# Patient Record
Sex: FEMALE | Race: WHITE | Employment: OTHER | URBAN - METROPOLITAN AREA
[De-identification: names, ages, dates, MRNs, and addresses within clinical notes are randomized per-mention and may not be internally consistent; named-entity substitution may affect disease eponyms.]

---

## 2018-01-27 ENCOUNTER — HOSPITAL ENCOUNTER (OUTPATIENT)
Dept: OTHER | Age: 68
Discharge: HOME | End: 2018-01-27
Attending: INTERNAL MEDICINE

## 2018-01-28 PROBLEM — N17.9 AKI (ACUTE KIDNEY INJURY) (HCC): Status: ACTIVE | Noted: 2018-01-28

## 2018-01-29 LAB
RAPID INFLUENZA  B AGN: NEGATIVE
RAPID INFLUENZA A AGN: POSITIVE

## 2018-11-20 ENCOUNTER — HOSPITAL ENCOUNTER (OUTPATIENT)
Age: 68
Setting detail: SPECIMEN
Discharge: HOME | End: 2018-11-20
Payer: MEDICARE

## 2018-11-20 PROCEDURE — 87077 CULTURE AEROBIC IDENTIFY: CPT

## 2018-11-20 PROCEDURE — 87186 SC STD MICRODIL/AGAR DIL: CPT

## 2018-11-20 PROCEDURE — 87073 CULTURE BACTERIA ANAEROBIC: CPT

## 2018-11-20 PROCEDURE — 87071 CULTURE AEROBIC QUANT OTHER: CPT

## 2018-11-24 LAB
CULTURE: NORMAL
CULTURE: NORMAL
Lab: NORMAL
ORGANISM: NORMAL
REPORT STATUS: NORMAL
SPECIMEN: NORMAL

## 2019-03-09 ENCOUNTER — HOSPITAL ENCOUNTER (EMERGENCY)
Age: 69
Discharge: HOME | End: 2019-03-09
Payer: MEDICARE

## 2019-03-09 ENCOUNTER — APPOINTMENT (OUTPATIENT)
Dept: GENERAL RADIOLOGY | Age: 69
End: 2019-03-09
Payer: MEDICARE

## 2019-03-09 ENCOUNTER — APPOINTMENT (OUTPATIENT)
Dept: CT IMAGING | Age: 69
End: 2019-03-09
Payer: MEDICARE

## 2019-03-09 VITALS
RESPIRATION RATE: 16 BRPM | HEIGHT: 64 IN | HEART RATE: 86 BPM | OXYGEN SATURATION: 96 % | TEMPERATURE: 97.5 F | SYSTOLIC BLOOD PRESSURE: 159 MMHG | WEIGHT: 149 LBS | DIASTOLIC BLOOD PRESSURE: 94 MMHG | BODY MASS INDEX: 25.44 KG/M2

## 2019-03-09 DIAGNOSIS — R68.84 JAW PAIN: ICD-10-CM

## 2019-03-09 DIAGNOSIS — M79.604 ACUTE LEG PAIN, RIGHT: ICD-10-CM

## 2019-03-09 DIAGNOSIS — S09.90XA INJURY OF HEAD, INITIAL ENCOUNTER: ICD-10-CM

## 2019-03-09 DIAGNOSIS — M25.562 ACUTE PAIN OF LEFT KNEE: ICD-10-CM
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
## 2019-03-09 PROCEDURE — 73560 X-RAY EXAM OF KNEE 1 OR 2: CPT

## 2019-03-09 PROCEDURE — 6370000000 HC RX 637 (ALT 250 FOR IP): Performed by: PHYSICIAN ASSISTANT

## 2019-03-09 PROCEDURE — 99284 EMERGENCY DEPT VISIT MOD MDM: CPT

## 2019-03-09 PROCEDURE — 72125 CT NECK SPINE W/O DYE: CPT

## 2019-03-09 PROCEDURE — 70450 CT HEAD/BRAIN W/O DYE: CPT

## 2019-03-09 PROCEDURE — 70486 CT MAXILLOFACIAL W/O DYE: CPT

## 2019-03-09 PROCEDURE — 73590 X-RAY EXAM OF LOWER LEG: CPT

## 2019-03-09 RX ORDER — ACETAMINOPHEN 500 MG
1000 TABLET ORAL ONCE
Status: COMPLETED | OUTPATIENT
Start: 2019-03-09 | End: 2019-03-09

## 2019-03-09 RX ADMIN — ACETAMINOPHEN 1000 MG: 500 TABLET ORAL at 18:54

## 2019-03-09 ASSESSMENT — PAIN SCALES - GENERAL
PAINLEVEL_OUTOF10: 5
PAINLEVEL_OUTOF10: 5

## 2019-03-09 ASSESSMENT — PAIN DESCRIPTION - PAIN TYPE: TYPE: ACUTE PAIN

## 2022-12-19 ENCOUNTER — HOSPITAL ENCOUNTER (OUTPATIENT)
Dept: GENERAL RADIOLOGY | Age: 72
Discharge: HOME | End: 2022-12-19
Payer: MEDICARE

## 2022-12-19 ENCOUNTER — HOSPITAL ENCOUNTER (OUTPATIENT)
Dept: SPEECH THERAPY | Age: 72
Setting detail: THERAPIES SERIES
Discharge: HOME | End: 2022-12-19
Payer: MEDICARE

## 2022-12-19 DIAGNOSIS — G72.41: ICD-10-CM

## 2022-12-19 DIAGNOSIS — R13.10 PROBLEMS WITH SWALLOWING AND MASTICATION: Primary | ICD-10-CM

## 2022-12-19 PROCEDURE — 92611 MOTION FLUOROSCOPY/SWALLOW: CPT | Performed by: SPEECH-LANGUAGE PATHOLOGIST

## 2022-12-19 PROCEDURE — 74230 X-RAY XM SWLNG FUNCJ C+: CPT

## 2022-12-19 NOTE — PROCEDURES
INSTRUMENTAL SWALLOW REPORT  MODIFIED BARIUM SWALLOW    Thank you for referring Nicole Friend    1950 for a modified barium swallow study. Please see attached results and contact me at your convenience if you have any questions or concerns. Skip Barnhart, SLP, MA, CCC-SLP  Speech/Language Pathologist  [facility]  Department of [address] [place] [address] Pathology  [phone]  2022  10:59 AM      NAME: Nicole Friend   : 1950  MRN: [id number]       Date of Eval: 2022     Ordering Physician: Je Interiano  Radiologist: Dr. Aguila Meier.     Referring Diagnosis(es): Referring Diagnosis: R13.10    Impressions:  Nicole Friend was seen for an outpatient MBSS due to her c/o dysphagia to bread and thin liquids. Relevant h/o Inclusion body myositis, HTN, AAA, IBS and Reflux. Pt specifically c/o globus sensation with PO intake as if food or liquid is \"just sitting\" in her throat. She denies any choking episodes. Pt reports lip weakness and numbness resulting in occasional anterior oral loss of liquids by spoon. In terms of nutrition, pt reports that she tires after 30 minutes of eating and cannot get through a meal stating \"I don't eat all that much. \"  She reports a 30# weight loss over one year and has weighed approximately 130# for the last six months. The oral phase of the swallow was [facility] with adequate bilabial closure for spoon, cup and straw presentations. Mildly prolonged mastication with normal clearance for all textures. Moderate pharyngoesophageal dysphagia with virtually absent epiglottic inversion and anterior laryngeal excursion and severely limited upper esophageal sphincter opening. Deficits resulted in trace penetration for thin liquids midway to the vocal folds and to the anterior commissure for thin residue.   Also severe residue in the pyriforms for all textures, effortful clearance through the UES with backflow into the pharynx observed. Pt was able to clear small amounts of residue with 3-5 swallows. She demonstrated reduced sensitivity to residue and required cues to complete repeat swallows. Esophageal screen:  sweep not completed, however, noted solids remained in upper 1/3 of the esophagus after the swallow. Recommend:  Regular solids as tolerated/Thins liquids by cup. Aspiration and Reflux precautions. May consider use of alternate route of nutrition to supplement oral intake due to c/o inability to consume full meals due to fatigue resulting from pharyngoesophageal dysphagia, h/o weight loss or monitor nutritional status/weight until needed. May consider use of oral nutritional supplement. Refer to GI for further assessment of esophagus. Past Medical History:  has a past medical history of Abdominal aortic aneurysm (AAA) >39 mm diameter ([address] Hypertension, and IBS (irritable bowel syndrome). Past Surgical History:  has a past surgical history that includes Tonsillectomy. Date of Prior Study: n/a  Type of Study: Initial MBS  Results of Prior Study: n/a    RecOral Preparation / Oral Phase     Pharyngeal Phase  Pharyngeal Phase: Impaired    Esophageal Phase  Esophageal Screen: Impaired    Upper Esophageal Screen- Major Contributing Deficits  Major Contributing Deficits: Reduced Cricopharyngeal Opening;Esophageal Backflow into the Pharynx; Decreased Esophageal Clearanceent CXR/CT of Chest:  n/a    Patient Complaints/Reason for Referral:  Tamara Rodriguez was referred for a MBS to assess the efficiency of his/her swallow function, assess for aspiration, and to make recommendations regarding safe dietary consistencies, effective compensatory strategies, and safe eating environment.   Patient complaints: c/o poor pharyngeal clearance for liquids and bread    Onset of problem:   Date of Onset: chronic with recent worsening     Subjective  Subjective: pt became tearful when reviewing results of the study    Behavior/Cognition/Vision/Hearing:  Behavior/Cognition: Alert; Cooperative;Pleasant mood  Vision: Within Functional Limits  Hearing: Within functional limits          Treatment Dx and ICD 10:   Patient Position: Lateral and    Consistencies Administered: Regular;Pureed; Thin teaspoon; Thin cup; Thin straw  Compensatory Swallowing Strategies Attempted: Remain upright for 30-45 minutes after meals; Alternate solids and liquids;Eat/Feed slowly  Postural Changes and/or Swallow Maneuvers Trialed: Upright 90 degrees    Pharyngeal: Moderate    Dysphagia Outcome Severity Scale: Level 3: Moderate dysphagia- Total assisstance, supervision or strategies. Two or more diet consistencies restricted  Penetration-Aspiration Scale (PAS): 5 - Material enters the airway, contacts the vocal folds, and is not ejected into the airway    Recommended Diet:  Solid consistency: Regular  Liquid consistency: Thin       Medication administration: PO    Safe Swallow Protocol:  Supervision: Independent  Compensatory Swallowing Strategies : Alternate solids and liquids;Upright as possible for all oral intake;Remain upright for 30-45 minutes after meals;Eat/Feed slowly    Recommendations/Treatment  Requires SLP Intervention: No     D/C Recommendations: No follow up therapy recommended post discharge     Recommended Exercises: not indicated at this time given etiology of dysphagia    Education: Images and recommendations were reviewed with pt following this exam.   Patient Education: results and recommendations  Patient Education Response: Demonstrated understanding    Prognosis  Prognosis for safe diet advancement: fair  Barriers/Prognosis: reduced esophageal opening  Duration/Frequency of Treatment  Duration of Treatment: n/a  Frequency of Treatment: n/a  Safety Devices  Safety Devices in place: Yes  Type of devices:  All fall risk precautions in place  Restraints Initially in Place: No    Goals:    Long Term:   Timeframe for Long-term Goals: n/a     Short Term:  n/a       Pain     Therapy Time:   Individual Concurrent Group Co-treatment   Time In  0945         Time Out  1030         Minutes  [address] [place], 12/19/2022, 10:59 AM

## 2023-01-20 ENCOUNTER — APPOINTMENT (OUTPATIENT)
Dept: GENERAL RADIOLOGY | Age: 73
DRG: 184 | End: 2023-01-20
Payer: MEDICARE

## 2023-01-20 ENCOUNTER — HOSPITAL ENCOUNTER (INPATIENT)
Age: 73
LOS: 3 days | Discharge: NURSING, REHAB OR LONG-TERM CARE | DRG: 184 | End: 2023-01-23
Attending: EMERGENCY MEDICINE | Admitting: STUDENT IN AN ORGANIZED HEALTH CARE EDUCATION/TRAINING PROGRAM
Payer: MEDICARE

## 2023-01-20 ENCOUNTER — APPOINTMENT (OUTPATIENT)
Dept: CT IMAGING | Age: 73
DRG: 184 | End: 2023-01-20
Payer: MEDICARE
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
## 2023-01-20 DIAGNOSIS — G72.41 INCLUSION BODY MYOSITIS: ICD-10-CM

## 2023-01-20 DIAGNOSIS — J90 PLEURAL EFFUSION: ICD-10-CM

## 2023-01-20 DIAGNOSIS — D64.9 ANEMIA, UNSPECIFIED TYPE: ICD-10-CM

## 2023-01-20 DIAGNOSIS — M79.18 MUSCULOSKELETAL PAIN: ICD-10-CM

## 2023-01-20 DIAGNOSIS — S92.406A CLOSED NONDISPLACED FRACTURE OF PHALANX OF GREAT TOE, UNSPECIFIED LATERALITY, UNSPECIFIED PHALANX, INITIAL ENCOUNTER: Primary | ICD-10-CM
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
## 2023-01-20 LAB
ABO/RH: NORMAL
ALBUMIN SERPL-MCNC: 4 GM/DL (ref 3.4–5)
ALP BLD-CCNC: 76 IU/L (ref 40–128)
ALT SERPL-CCNC: 24 U/L (ref 10–40)
ANION GAP SERPL CALCULATED.3IONS-SCNC: 10 MMOL/L (ref 4–16)
ANTIBODY SCREEN: NEGATIVE
AST SERPL-CCNC: 27 IU/L (ref 15–37)
BASOPHILS ABSOLUTE: 0 K/CU MM
BASOPHILS RELATIVE PERCENT: 0.3 % (ref 0–1)
BILIRUB SERPL-MCNC: 0.8 MG/DL (ref 0–1)
BUN BLDV-MCNC: 34 MG/DL (ref 6–23)
CALCIUM SERPL-MCNC: 10.1 MG/DL (ref 8.3–10.6)
CHLORIDE BLD-SCNC: 96 MMOL/L (ref 99–110)
CO2: 28 MMOL/L (ref 21–32)
CREAT SERPL-MCNC: 1.1 MG/DL (ref 0.6–1.1)
DIFFERENTIAL TYPE: ABNORMAL
EOSINOPHILS ABSOLUTE: 0.1 K/CU MM
EOSINOPHILS RELATIVE PERCENT: 2 % (ref 0–3)
GFR SERPL CREATININE-BSD FRML MDRD: 53 ML/MIN/1.73M2
GLUCOSE BLD-MCNC: 101 MG/DL (ref 70–99)
HCT VFR BLD CALC: 26.3 % (ref 37–47)
HEMOGLOBIN: 8.7 GM/DL (ref 12.5–16)
IMMATURE NEUTROPHIL %: 0.3 % (ref 0–0.43)
LACTATE: 0.9 MMOL/L (ref 0.5–1.9)
LYMPHOCYTES ABSOLUTE: 0.8 K/CU MM
LYMPHOCYTES RELATIVE PERCENT: 20.7 % (ref 24–44)
MCH RBC QN AUTO: 31.6 PG (ref 27–31)
MCHC RBC AUTO-ENTMCNC: 33.1 % (ref 32–36)
MCV RBC AUTO: 95.6 FL (ref 78–100)
MONOCYTES ABSOLUTE: 0.4 K/CU MM
MONOCYTES RELATIVE PERCENT: 11.2 % (ref 0–4)
NUCLEATED RBC %: 0 %
PDW BLD-RTO: 13.1 % (ref 11.7–14.9)
PLATELET # BLD: 137 K/CU MM (ref 140–440)
PMV BLD AUTO: 9.4 FL (ref 7.5–11.1)
POTASSIUM SERPL-SCNC: 3.3 MMOL/L (ref 3.5–5.1)
RBC # BLD: 2.75 M/CU MM (ref 4.2–5.4)
SEGMENTED NEUTROPHILS ABSOLUTE COUNT: 2.6 K/CU MM
SEGMENTED NEUTROPHILS RELATIVE PERCENT: 65.5 % (ref 36–66)
SODIUM BLD-SCNC: 134 MMOL/L (ref 135–145)
TOTAL CK: 134 IU/L (ref 26–140)
TOTAL IMMATURE NEUTOROPHIL: 0.01 K/CU MM
TOTAL NUCLEATED RBC: 0 K/CU MM
TOTAL PROTEIN: 7.1 GM/DL (ref 6.4–8.2)
WBC # BLD: 3.9 K/CU MM (ref 4–10.5)

## 2023-01-20 PROCEDURE — 84443 ASSAY THYROID STIM HORMONE: CPT

## 2023-01-20 PROCEDURE — 85045 AUTOMATED RETICULOCYTE COUNT: CPT

## 2023-01-20 PROCEDURE — 73610 X-RAY EXAM OF ANKLE: CPT

## 2023-01-20 PROCEDURE — 85025 COMPLETE CBC W/AUTO DIFF WBC: CPT

## 2023-01-20 PROCEDURE — 1200000000 HC SEMI PRIVATE

## 2023-01-20 PROCEDURE — 73630 X-RAY EXAM OF FOOT: CPT

## 2023-01-20 PROCEDURE — 86900 BLOOD TYPING SEROLOGIC ABO: CPT

## 2023-01-20 PROCEDURE — 80053 COMPREHEN METABOLIC PANEL: CPT

## 2023-01-20 PROCEDURE — 86901 BLOOD TYPING SEROLOGIC RH(D): CPT

## 2023-01-20 PROCEDURE — 86850 RBC ANTIBODY SCREEN: CPT

## 2023-01-20 PROCEDURE — 83550 IRON BINDING TEST: CPT

## 2023-01-20 PROCEDURE — 84439 ASSAY OF FREE THYROXINE: CPT

## 2023-01-20 PROCEDURE — 73590 X-RAY EXAM OF LOWER LEG: CPT

## 2023-01-20 PROCEDURE — 72170 X-RAY EXAM OF PELVIS: CPT

## 2023-01-20 PROCEDURE — 71250 CT THORAX DX C-: CPT

## 2023-01-20 PROCEDURE — 6370000000 HC RX 637 (ALT 250 FOR IP): Performed by: EMERGENCY MEDICINE

## 2023-01-20 PROCEDURE — 6360000002 HC RX W HCPCS: Performed by: STUDENT IN AN ORGANIZED HEALTH CARE EDUCATION/TRAINING PROGRAM

## 2023-01-20 PROCEDURE — 6370000000 HC RX 637 (ALT 250 FOR IP): Performed by: NURSE PRACTITIONER

## 2023-01-20 PROCEDURE — 93005 ELECTROCARDIOGRAM TRACING: CPT | Performed by: EMERGENCY MEDICINE

## 2023-01-20 PROCEDURE — 71045 X-RAY EXAM CHEST 1 VIEW: CPT

## 2023-01-20 PROCEDURE — 99285 EMERGENCY DEPT VISIT HI MDM: CPT

## 2023-01-20 PROCEDURE — 2580000003 HC RX 258: Performed by: STUDENT IN AN ORGANIZED HEALTH CARE EDUCATION/TRAINING PROGRAM

## 2023-01-20 PROCEDURE — 83540 ASSAY OF IRON: CPT

## 2023-01-20 PROCEDURE — 83605 ASSAY OF LACTIC ACID: CPT

## 2023-01-20 PROCEDURE — 81003 URINALYSIS AUTO W/O SCOPE: CPT

## 2023-01-20 PROCEDURE — 82550 ASSAY OF CK (CPK): CPT

## 2023-01-20 PROCEDURE — 93005 ELECTROCARDIOGRAM TRACING: CPT | Performed by: STUDENT IN AN ORGANIZED HEALTH CARE EDUCATION/TRAINING PROGRAM

## 2023-01-20 RX ORDER — HYDROCODONE BITARTRATE AND ACETAMINOPHEN 5; 325 MG/1; MG/1
1 TABLET ORAL ONCE
Status: COMPLETED | OUTPATIENT
Start: 2023-01-20 | End: 2023-01-20

## 2023-01-20 RX ORDER — ACETAMINOPHEN 500 MG
1000 TABLET ORAL ONCE
Status: DISCONTINUED | OUTPATIENT
Start: 2023-01-20 | End: 2023-01-21

## 2023-01-20 RX ORDER — ACETAMINOPHEN 650 MG/1
650 SUPPOSITORY RECTAL EVERY 6 HOURS PRN
Status: DISCONTINUED | OUTPATIENT
Start: 2023-01-20 | End: 2023-01-23 | Stop reason: HOSPADM

## 2023-01-20 RX ORDER — HYDROCODONE BITARTRATE AND ACETAMINOPHEN 5; 325 MG/1; MG/1
1 TABLET ORAL EVERY 4 HOURS PRN
Status: DISCONTINUED | OUTPATIENT
Start: 2023-01-20 | End: 2023-01-21

## 2023-01-20 RX ORDER — ONDANSETRON 4 MG/1
4 TABLET, ORALLY DISINTEGRATING ORAL EVERY 8 HOURS PRN
Status: DISCONTINUED | OUTPATIENT
Start: 2023-01-20 | End: 2023-01-23 | Stop reason: HOSPADM

## 2023-01-20 RX ORDER — POTASSIUM CHLORIDE 20 MEQ/1
40 TABLET, EXTENDED RELEASE ORAL PRN
Status: DISCONTINUED | OUTPATIENT
Start: 2023-01-20 | End: 2023-01-23 | Stop reason: HOSPADM

## 2023-01-20 RX ORDER — ENOXAPARIN SODIUM 100 MG/ML
40 INJECTION SUBCUTANEOUS NIGHTLY
Status: DISCONTINUED | OUTPATIENT
Start: 2023-01-20 | End: 2023-01-23 | Stop reason: HOSPADM

## 2023-01-20 RX ORDER — POLYETHYLENE GLYCOL 3350 17 G/17G
17 POWDER, FOR SOLUTION ORAL DAILY PRN
Status: DISCONTINUED | OUTPATIENT
Start: 2023-01-20 | End: 2023-01-23 | Stop reason: HOSPADM

## 2023-01-20 RX ORDER — SODIUM CHLORIDE 0.9 % (FLUSH) 0.9 %
5-40 SYRINGE (ML) INJECTION PRN
Status: DISCONTINUED | OUTPATIENT
Start: 2023-01-20 | End: 2023-01-23 | Stop reason: HOSPADM

## 2023-01-20 RX ORDER — MAGNESIUM SULFATE IN WATER 40 MG/ML
2000 INJECTION, SOLUTION INTRAVENOUS PRN
Status: DISCONTINUED | OUTPATIENT
Start: 2023-01-20 | End: 2023-01-23 | Stop reason: HOSPADM

## 2023-01-20 RX ORDER — POTASSIUM CHLORIDE 7.45 MG/ML
10 INJECTION INTRAVENOUS PRN
Status: DISCONTINUED | OUTPATIENT
Start: 2023-01-20 | End: 2023-01-23 | Stop reason: HOSPADM

## 2023-01-20 RX ORDER — SODIUM CHLORIDE 9 MG/ML
500 INJECTION, SOLUTION INTRAVENOUS PRN
Status: DISCONTINUED | OUTPATIENT
Start: 2023-01-20 | End: 2023-01-23 | Stop reason: HOSPADM

## 2023-01-20 RX ORDER — SODIUM CHLORIDE 0.9 % (FLUSH) 0.9 %
5-40 SYRINGE (ML) INJECTION EVERY 12 HOURS SCHEDULED
Status: DISCONTINUED | OUTPATIENT
Start: 2023-01-20 | End: 2023-01-23 | Stop reason: HOSPADM

## 2023-01-20 RX ORDER — ACETAMINOPHEN 325 MG/1
650 TABLET ORAL EVERY 6 HOURS PRN
Status: DISCONTINUED | OUTPATIENT
Start: 2023-01-20 | End: 2023-01-23 | Stop reason: HOSPADM

## 2023-01-20 RX ORDER — ONDANSETRON 2 MG/ML
4 INJECTION INTRAMUSCULAR; INTRAVENOUS EVERY 6 HOURS PRN
Status: DISCONTINUED | OUTPATIENT
Start: 2023-01-20 | End: 2023-01-23 | Stop reason: HOSPADM

## 2023-01-20 RX ADMIN — Medication 10 ML: at 20:59

## 2023-01-20 RX ADMIN — HYDROCODONE BITARTRATE AND ACETAMINOPHEN 1 TABLET: 5; 325 TABLET ORAL at 18:22

## 2023-01-20 RX ADMIN — HYDROCODONE BITARTRATE AND ACETAMINOPHEN 1 TABLET: 5; 325 TABLET ORAL at 22:47

## 2023-01-20 RX ADMIN — ENOXAPARIN SODIUM 40 MG: 100 INJECTION SUBCUTANEOUS at 20:59

## 2023-01-20 ASSESSMENT — ENCOUNTER SYMPTOMS
ALLERGIC/IMMUNOLOGIC NEGATIVE: 1
RESPIRATORY NEGATIVE: 1
EYES NEGATIVE: 1
GASTROINTESTINAL NEGATIVE: 1

## 2023-01-20 ASSESSMENT — PAIN SCALES - GENERAL
PAINLEVEL_OUTOF10: 8
PAINLEVEL_OUTOF10: 7
PAINLEVEL_OUTOF10: 7

## 2023-01-20 ASSESSMENT — PAIN DESCRIPTION - LOCATION: LOCATION: ABDOMEN

## 2023-01-20 ASSESSMENT — LIFESTYLE VARIABLES
HOW OFTEN DO YOU HAVE A DRINK CONTAINING ALCOHOL: NEVER
HOW MANY STANDARD DRINKS CONTAINING ALCOHOL DO YOU HAVE ON A TYPICAL DAY: PATIENT DOES NOT DRINK

## 2023-01-20 ASSESSMENT — PAIN DESCRIPTION - DESCRIPTORS: DESCRIPTORS: ACHING;CRAMPING;DISCOMFORT

## 2023-01-20 ASSESSMENT — PAIN DESCRIPTION - ORIENTATION: ORIENTATION: LEFT

## 2023-01-20 NOTE — CARE COORDINATION
CM - Room 35 -Dr Elvia Castillo -Pt  Anita Lainez (caretaker)  at bedside . Ms Kylie Noble stated she has a rare disease (Inclusion Body Myositis ) an inflammatory condition of the muscles causing weakness . She has been experiencing falls in the last week or two. She normally ambulates with a walker but says her episodes involve her entire body- legs , arms , throat  facial and on . Her doctor is Alina Odonnell MD a disease specialist at the [address] [place] [address] [facility]. During the last fall, she claimed her feet folded under failing her as she fell. CM gave options of [facility] /suggestions for DME  and gave information on Rehabilitation / SNF. They were very well educated on the disease and assistive devices --but her biggest concern was her discomfort level at present     The results have yet to be delivered to pt and  , as decisions for care will be based on that.      Debbi Foy / Hermon Meigs

## 2023-01-20 NOTE — ED NOTES
The following labs were labeled with appropriate pt sticker and tubed to lab:     [x] Blue     [x] Lavender   [] on ice  [x] Green/yellow  [] Green/black [] on ice  [x] Grey  [x] on ice  [] Yellow  [x] Red  [] Type/ Screen  [] ABG  [] VBG    [] COVID-19 swab    [] Rapid  [] PCR  [] Flu swab  [] Peds Viral Panel     [] Urine Sample  [] Fecal Sample  [] Pelvic Cultures  [] Blood Cultures  [] X 2  [] STREP Cultures         Lloyd Farias RN  01/20/23 9450
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
## 2023-01-20 NOTE — ED PROVIDER NOTES
ANY [facility]      TRIAGE CHIEF COMPLAINT:   Fall (Fall x 2 last Saturday. )      [place]:  Ed Beckman is a 67 y.o. female that presents with complaint of fall, musculoskeletal pain. 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 Denies any fevers nausea vomiting cough abdominal pain headache neck pain back pain did not pass out did not hit her head no other questions or concerns. Came by EMS. Juan Driver REVIEW OF SYSTEMS:  At least 10 systems reviewed and otherwise acutely negative except as in the [address] Review of Systems   Constitutional: Negative. HENT: Negative. Eyes: Negative. Respiratory: Negative. Cardiovascular: Negative. Gastrointestinal: Negative. Endocrine: Negative. Genitourinary: Negative. Musculoskeletal:  Positive for arthralgias, gait problem and myalgias. Skin: Negative. Allergic/Immunologic: Negative. Neurological:  Positive for weakness. Hematological: Negative. Psychiatric/Behavioral: Negative. All other systems reviewed and are negative. Past Medical History:   Diagnosis Date    Abdominal aortic aneurysm (AAA) >39 mm diameter     Hypertension     IBS (irritable bowel syndrome)      Past Surgical History:   Procedure Laterality Date    TONSILLECTOMY       History reviewed. No pertinent family history.   Social History     Socioeconomic History    Marital status:      Spouse name: Not on file    Number of children: Not on file    Years of education: Not on file    Highest education level: Not on file   Occupational History    Not on file   Tobacco Use    Smoking status: Former    Smokeless tobacco: Never   Substance and Sexual Activity    Alcohol use: Yes     Comment: occ    Drug use: No    Sexual activity: Not on file   Other Topics Concern    Not on file   Social History Narrative    Not on file     Social Determinants of Health     Financial Resource Strain: Not on file   Food Insecurity: Not on file   Transportation Needs: Not on file   Physical Activity: Not on file   Stress: Not on file   Social Connections: Not on file   Intimate Partner Violence: Not on file   Housing Stability: Not on file     Current Facility-Administered Medications   Medication Dose Route Frequency Provider Last Rate Last Admin    acetaminophen (TYLENOL) tablet 1,000 mg  1,000 mg Oral Once [employer], DO        sodium chloride flush 0.9 % injection 5-40 mL  5-40 mL IntraVENous 2 times per day Eyad Alcantara MD   10 mL at 01/20/23 2059    sodium chloride flush 0.9 % injection 5-40 mL  5-40 mL IntraVENous PRN Eyad Alcantara MD        0.9 % sodium chloride infusion  500 mL IntraVENous PRN Eyad Alcantara MD        enoxaparin (LOVENOX) injection 40 mg  40 mg SubCUTAneous Nightly Eyad Alcantara MD   40 mg at 01/20/23 2059    ondansetron (ZOFRAN-ODT) disintegrating tablet 4 mg  4 mg Oral Q8H PRN Eyad Alcantara MD        Or    ondansetron [facility]) injection 4 mg  4 mg IntraVENous Q6H PRN Eyad Alcantara MD        polyethylene glycol (GLYCOLAX) packet 17 g  17 g Oral Daily PRN Eyad Alcantara MD        acetaminophen (TYLENOL) tablet 650 mg  650 mg Oral Q6H PRN Eyad Alcantara MD        Or    acetaminophen (TYLENOL) suppository 650 mg  650 mg Rectal Q6H PRBEENA Alcantara MD        potassium chloride (KLOR-CON M) extended release tablet 40 mEq  40 mEq Oral PRN Eyad Alcantara MD        Or    potassium bicarb-citric acid (EFFER-K) effervescent tablet 40 mEq  40 mEq Oral PRBEENA Alcantara MD        Or    potassium chloride 10 mEq/100 mL IVPB (Peripheral Line)  10 mEq IntraVENous PRN Eyad Alcantara MD        magnesium sulfate 2000 mg in 50 mL IVPB premix  2,000 mg IntraVENous PRN Eyad Alcantara MD        HYDROcodone-acetaminophen (NORCO) 5-325 MG per tablet 1 tablet  1 tablet Oral Q4H PRN ONEL Wing CNP          No Known Allergies  Current Facility-Administered Medications   Medication Dose Route Frequency Provider Last Rate Last Admin    acetaminophen (TYLENOL) tablet 1,000 mg  1,000 mg Oral Once Bran Doran DO        sodium chloride flush 0.9 % injection 5-40 mL  5-40 mL IntraVENous 2 times per day Masha Wetzel MD   10 mL at 01/20/23 2059    sodium chloride flush 0.9 % injection 5-40 mL  5-40 mL IntraVENous PRN Masha Wetzel MD        0.9 % sodium chloride infusion  500 mL IntraVENous PRN Masha Wetzel MD        enoxaparin (LOVENOX) injection 40 mg  40 mg SubCUTAneous Nightly Masha Wetzel MD   40 mg at 01/20/23 2059    ondansetron (ZOFRAN-ODT) disintegrating tablet 4 mg  4 mg Oral Q8H PRN Masha Wetzel MD        Or    ondansetron [facility]) injection 4 mg  4 mg IntraVENous Q6H PRN Masha Wetzel MD        polyethylene glycol (GLYCOLAX) packet 17 g  17 g Oral Daily PRN Masha Wetzel MD        acetaminophen (TYLENOL) tablet 650 mg  650 mg Oral Q6H PRN Masha Wetzel MD        Or    acetaminophen (TYLENOL) suppository 650 mg  650 mg Rectal Q6H PRN Masha Wetzel MD        potassium chloride (KLOR-CON M) extended release tablet 40 mEq  40 mEq Oral PRN Masha Wetzel MD        Or    potassium bicarb-citric acid (EFFER-K) effervescent tablet 40 mEq  40 mEq Oral PRN Masha Wetzel MD        Or    potassium chloride 10 mEq/100 mL IVPB (Peripheral Line)  10 mEq IntraVENous PRN Masha Wetzel MD        magnesium sulfate 2000 mg in 50 mL IVPB premix  2,000 mg IntraVENous PRN Masha Wetzel MD        HYDROcodone-acetaminophen (NORCO) 5-325 MG per tablet 1 tablet  1 tablet Oral Q4H PRN ONEL Delgado CNP           Nursing Notes Reviewed    VITAL SIGNS:  ED Triage Vitals [01/20/23 3378]   Enc Vitals Group      BP (!) 174/84      Heart Rate 84      Resp 15      Temp 98.7 °F (37.1 °C)      Temp src       SpO2 96 %      Weight Height       Head Circumference       Peak Flow       Pain Score       Pain Loc       Pain Edu? Excl. in [address]? PHYSICAL EXAM:  Physical Exam  Vitals and nursing note reviewed. Constitutional:       General: She is not in acute distress. Appearance: Normal appearance. She is not ill-appearing, toxic-appearing or diaphoretic. HENT:      Head: Normocephalic and atraumatic. Right Ear: External ear normal.      Left Ear: External ear normal.      Nose: No congestion or rhinorrhea. Eyes:      General: No scleral icterus. Right eye: No discharge. Left eye: No discharge. Extraocular Movements: Extraocular movements intact. Conjunctiva/sclera: Conjunctivae normal.      Pupils: Pupils are equal, round, and reactive to light. Cardiovascular:      Rate and Rhythm: Normal rate and regular rhythm. Pulses: Normal pulses. Heart sounds: Normal heart sounds. Pulmonary:      Effort: Pulmonary effort is normal. No respiratory distress. Breath sounds: Normal breath sounds. No stridor. No wheezing, rhonchi or rales. Chest:      Chest wall: Tenderness present. Abdominal:      General: Bowel sounds are normal. There is no distension. Palpations: Abdomen is soft. There is no mass. Tenderness: There is no abdominal tenderness. There is no guarding or rebound. Negative signs include Summers's sign, Rovsing's sign and McBurney's sign. Hernia: No hernia is present. Musculoskeletal:         General: Swelling, tenderness and signs of injury present. No deformity. Right shoulder: Normal.      Left shoulder: Normal.      Right upper arm: Normal.      Left upper arm: Normal.      Right elbow: Normal.      Left elbow: Normal.      Right forearm: Normal.      Left forearm: Normal.      Right wrist: Normal.      Left wrist: Normal.      Cervical back: Normal and normal range of motion. No rigidity or tenderness.       Thoracic back: Normal.      Lumbar back: Normal.      Right hip: Normal.      Left hip: Normal.      Right upper leg: Normal.      Left upper leg: Normal.      Right knee: Normal.      Left knee: Normal.      Right lower leg: Tenderness present. No edema. Left lower leg: Tenderness present. No edema. Right ankle: Tenderness present. Right Achilles Tendon: Normal.      Left ankle: Tenderness present. Left Achilles Tendon: Normal.      Right foot: Decreased range of motion. Swelling and tenderness present. No laceration or crepitus. Normal pulse. Left foot: Decreased range of motion. Swelling and tenderness present. No laceration or crepitus. Normal pulse. Skin:     Coloration: Skin is not jaundiced or pale. Findings: Bruising present. No erythema, lesion or rash. Neurological:      General: No focal deficit present. Mental Status: She is alert and oriented to person, place, and time. Cranial Nerves: No cranial nerve deficit. Sensory: No sensory deficit. Motor: No weakness. Psychiatric:         Mood and Affect: Mood normal.         Behavior: Behavior normal.         Thought Content:  Thought content normal.         Judgment: Judgment normal.         I have reviewed andinterpreted all of the currently available lab results from this visit (if applicable):    Results for orders placed or performed during the hospital encounter of 01/20/23   CBC with Auto Differential   Result Value Ref Range    WBC 3.9 (L) 4.0 - 10.5 K/CU MM    RBC 2.75 (L) 4.2 - 5.4 M/CU MM    Hemoglobin 8.7 (L) 12.5 - 16.0 GM/DL    Hematocrit 26.3 (L) 37 - 47 %    MCV 95.6 78 - 100 FL    MCH 31.6 (H) 27 - 31 PG    MCHC 33.1 32.0 - 36.0 %    RDW 13.1 11.7 - 14.9 %    Platelets 788 (L) 621 - 440 K/CU MM    MPV 9.4 7.5 - 11.1 FL    Differential Type AUTOMATED DIFFERENTIAL     Segs Relative 65.5 36 - 66 %    Lymphocytes % 20.7 (L) 24 - 44 %    Monocytes % 11.2 (H) 0 - 4 %    Eosinophils % 2.0 0 - 3 %    Basophils % 0.3 0 - 1 %    Segs Absolute 2.6 K/CU MM    Lymphocytes Absolute 0.8 K/CU MM    Monocytes Absolute 0.4 K/CU MM    Eosinophils Absolute 0.1 K/CU MM    Basophils Absolute 0.0 K/CU MM    Nucleated RBC % 0.0 %    Total Nucleated RBC 0.0 K/CU MM    Total Immature Neutrophil 0.01 K/CU MM    Immature Neutrophil % 0.3 0 - 0.43 %   Comprehensive Metabolic Panel   Result Value Ref Range    Sodium 134 (L) 135 - 145 MMOL/L    Potassium 3.3 (L) 3.5 - 5.1 MMOL/L    Chloride 96 (L) 99 - 110 mMol/L    CO2 28 21 - 32 MMOL/L    BUN 34 (H) 6 - 23 MG/DL    Creatinine 1.1 0.6 - 1.1 MG/DL    Est, Glom Filt Rate 53 (L) >60 mL/min/1.73m2    Glucose 101 (H) 70 - 99 MG/DL    Calcium 10.1 8.3 - 10.6 MG/DL    Albumin 4.0 3.4 - 5.0 GM/DL    Total Protein 7.1 6.4 - 8.2 GM/DL    Total Bilirubin 0.8 0.0 - 1.0 MG/DL    ALT 24 10 - 40 U/L    AST 27 15 - 37 IU/L    Alkaline Phosphatase 76 40 - 128 IU/L    Anion Gap 10 4 - 16   CK   Result Value Ref Range    Total  26 - 140 IU/L   Lactic Acid   Result Value Ref Range    Lactate 0.9 0.5 - 1.9 mMOL/L   EKG 12 Lead   Result Value Ref Range    Ventricular Rate 84 BPM    Atrial Rate 84 BPM    P-R Interval 190 ms    QRS Duration 94 ms    Q-T Interval 404 ms    QTc Calculation (Bazett) 477 ms    P Axis 28 degrees    R Axis -51 degrees    T Axis 30 degrees    Diagnosis       Normal sinus rhythm  Left anterior fascicular block  Moderate voltage criteria for LVH, may be normal variant  Abnormal ECG  When compared with ECG of 28-JAN-2018 19:32,  No significant change was found     TYPE AND SCREEN   Result Value Ref Range    ABO/Rh O POSITIVE     Antibody Screen NEGATIVE         Radiographs (if obtained):  [] The following radiograph was interpreted by myself in the absence of a radiologist:  [x] Radiologist's Report Reviewed:    CXR, pelvis, Xray R/L legs    EKG (if obtained): (All EKG's are interpreted by myself in the absence of a cardiologist)    12 lead EKG per my interpretation:  Normal Sinus Rhythm 84 LAFB  Axis is Left axis deviation  QTc is   477  There is no specific T wave changes appreciated. There is no specific ST wave changes appreciated. Prior EKG to compare with was not available       MDM:    Patient with complaint of frequent falls, bilateral leg pain, feet pain, rib pain. Patient states she fell on Saturday she has a history of inclusion body myositis follows at [facility]. No blood thinners. She states that she has been following more frequently. She said fell twice on Saturday has not fallen since then. She did not pass out did not hit her head she has no headache neck pain back pain no arm pain does have bilateral shin pain ankle pain and feet pain bruising. No other questions or concerns. Patient arrival appears otherwise well she does have left-sided rib pain as well. She came with EMS. Vital signs are stable did consult case management. She states she does not feel safe at home because she has been falling. She does not have home health care. Patient seen by case management. Did order labs, imaging and she is anemic also imaging shows pleural effusion on the left side, does have bilateral feet fractures greater phalanx of both great toes, metatarsals on the left foot fifth metatarsal right foot some old some new. She has good pulses otherwise no acute abnormality seen on imaging I did talk to orthopedic surgery they will see her in the hospital, likely will need a boot and weightbearing as tolerated. At this time otherwise patient stable given that she broke both feet falls frequently I will get her to the hospital for orthopedic evaluation and hospital medicine evaluation. She does have pleural effusion did order cardiac enzymes she has no chest pain or shortness of breath EKG is negative. No signs of pneumothorax no obvious rib fracture patient admitted otherwise stable.     CLINICAL IMPRESSION:  Final diagnoses:   Fall, initial encounter   Musculoskeletal pain   Inclusion body myositis Pleural effusion   Closed nondisplaced fracture of phalanx of great toe, unspecified laterality, unspecified phalanx, initial encounter   Anemia, unspecified type       (Please note that portions of this note may have been completed with a voice recognition program. Efforts were made to edit the dictations but occasionally words aremis-transcribed.)    DISPOSITION REFERRAL (if applicable):  No follow-up provider specified.     DISPOSITION MEDICATIONS (if applicable):  Current Discharge Medication List             DO Tanya Oglesby DO  01/20/23 4221

## 2023-01-20 NOTE — ED TRIAGE NOTES
68 y/o female to the ed with a c/c of falls x 2 > than 7 days prior. Patient denies chest pain, sob or difficulty breathing. Patient denies ABD pain, n/v/d or fever. Patient noted with + msp x 4, pupils PERRLA @ 3 and lung sounds that are clear and equil bilaterally. Patient placed on telemetry, BP and pulse ox. 12-lead EKG performed. Vitals noted as recorded. PIV placed with labs drawn and sent. Call light placed within patient's reach, and bed in lowest position with side rails up x 2 for safety. Provider at the bedside for assessment.

## 2023-01-21 LAB
ALBUMIN SERPL-MCNC: 3.5 GM/DL (ref 3.4–5)
ALP BLD-CCNC: 67 IU/L (ref 40–128)
ALT SERPL-CCNC: 20 U/L (ref 10–40)
ANION GAP SERPL CALCULATED.3IONS-SCNC: 8 MMOL/L (ref 4–16)
AST SERPL-CCNC: 23 IU/L (ref 15–37)
BASOPHILS ABSOLUTE: 0 K/CU MM
BASOPHILS RELATIVE PERCENT: 0.7 % (ref 0–1)
BILIRUB SERPL-MCNC: 0.7 MG/DL (ref 0–1)
BUN BLDV-MCNC: 37 MG/DL (ref 6–23)
CALCIUM SERPL-MCNC: 9.4 MG/DL (ref 8.3–10.6)
CHLORIDE BLD-SCNC: 98 MMOL/L (ref 99–110)
CO2: 29 MMOL/L (ref 21–32)
CREAT SERPL-MCNC: 1.1 MG/DL (ref 0.6–1.1)
DIFFERENTIAL TYPE: ABNORMAL
EKG ATRIAL RATE: 72 BPM
EKG ATRIAL RATE: 84 BPM
EKG DIAGNOSIS: NORMAL
EKG DIAGNOSIS: NORMAL
EKG P AXIS: 28 DEGREES
EKG P AXIS: 35 DEGREES
EKG P-R INTERVAL: 190 MS
EKG P-R INTERVAL: 202 MS
EKG Q-T INTERVAL: 404 MS
EKG Q-T INTERVAL: 418 MS
EKG QRS DURATION: 94 MS
EKG QRS DURATION: 94 MS
EKG QTC CALCULATION (BAZETT): 457 MS
EKG QTC CALCULATION (BAZETT): 477 MS
EKG R AXIS: -47 DEGREES
EKG R AXIS: -51 DEGREES
EKG T AXIS: 30 DEGREES
EKG T AXIS: 37 DEGREES
EKG VENTRICULAR RATE: 72 BPM
EKG VENTRICULAR RATE: 84 BPM
EOSINOPHILS ABSOLUTE: 0.1 K/CU MM
EOSINOPHILS RELATIVE PERCENT: 2.6 % (ref 0–3)
GFR SERPL CREATININE-BSD FRML MDRD: 53 ML/MIN/1.73M2
GLUCOSE BLD-MCNC: 115 MG/DL (ref 70–99)
GLUCOSE BLD-MCNC: 86 MG/DL (ref 70–99)
HCT VFR BLD CALC: 34.7 % (ref 37–47)
HEMOGLOBIN: 11.4 GM/DL (ref 12.5–16)
IMMATURE NEUTROPHIL %: 0.2 % (ref 0–0.43)
LACTATE: 0.7 MMOL/L (ref 0.5–1.9)
LYMPHOCYTES ABSOLUTE: 1.8 K/CU MM
LYMPHOCYTES RELATIVE PERCENT: 38.4 % (ref 24–44)
MAGNESIUM: 1.7 MG/DL (ref 1.8–2.4)
MCH RBC QN AUTO: 31.1 PG (ref 27–31)
MCHC RBC AUTO-ENTMCNC: 32.9 % (ref 32–36)
MCV RBC AUTO: 94.8 FL (ref 78–100)
MONOCYTES ABSOLUTE: 0.6 K/CU MM
MONOCYTES RELATIVE PERCENT: 13.4 % (ref 0–4)
NUCLEATED RBC %: 0 %
PDW BLD-RTO: 13.2 % (ref 11.7–14.9)
PLATELET # BLD: 187 K/CU MM (ref 140–440)
PMV BLD AUTO: 9.1 FL (ref 7.5–11.1)
POTASSIUM SERPL-SCNC: 3.5 MMOL/L (ref 3.5–5.1)
RBC # BLD: 3.66 M/CU MM (ref 4.2–5.4)
SEGMENTED NEUTROPHILS ABSOLUTE COUNT: 2 K/CU MM
SEGMENTED NEUTROPHILS RELATIVE PERCENT: 44.7 % (ref 36–66)
SODIUM BLD-SCNC: 135 MMOL/L (ref 135–145)
TOTAL IMMATURE NEUTOROPHIL: 0.01 K/CU MM
TOTAL NUCLEATED RBC: 0 K/CU MM
TOTAL PROTEIN: 6.1 GM/DL (ref 6.4–8.2)
WBC # BLD: 4.6 K/CU MM (ref 4–10.5)

## 2023-01-21 PROCEDURE — 6370000000 HC RX 637 (ALT 250 FOR IP): Performed by: SURGERY

## 2023-01-21 PROCEDURE — 83735 ASSAY OF MAGNESIUM: CPT

## 2023-01-21 PROCEDURE — 97163 PT EVAL HIGH COMPLEX 45 MIN: CPT

## 2023-01-21 PROCEDURE — 97116 GAIT TRAINING THERAPY: CPT

## 2023-01-21 PROCEDURE — 82962 GLUCOSE BLOOD TEST: CPT

## 2023-01-21 PROCEDURE — 36415 COLL VENOUS BLD VENIPUNCTURE: CPT

## 2023-01-21 PROCEDURE — 94664 DEMO&/EVAL PT USE INHALER: CPT

## 2023-01-21 PROCEDURE — 83540 ASSAY OF IRON: CPT

## 2023-01-21 PROCEDURE — 82746 ASSAY OF FOLIC ACID SERUM: CPT

## 2023-01-21 PROCEDURE — 82607 VITAMIN B-12: CPT

## 2023-01-21 PROCEDURE — 84484 ASSAY OF TROPONIN QUANT: CPT

## 2023-01-21 PROCEDURE — 6360000002 HC RX W HCPCS: Performed by: STUDENT IN AN ORGANIZED HEALTH CARE EDUCATION/TRAINING PROGRAM

## 2023-01-21 PROCEDURE — 83550 IRON BINDING TEST: CPT

## 2023-01-21 PROCEDURE — 1200000000 HC SEMI PRIVATE

## 2023-01-21 PROCEDURE — 83605 ASSAY OF LACTIC ACID: CPT

## 2023-01-21 PROCEDURE — 85025 COMPLETE CBC W/AUTO DIFF WBC: CPT

## 2023-01-21 PROCEDURE — 94150 VITAL CAPACITY TEST: CPT

## 2023-01-21 PROCEDURE — 2580000003 HC RX 258: Performed by: STUDENT IN AN ORGANIZED HEALTH CARE EDUCATION/TRAINING PROGRAM

## 2023-01-21 PROCEDURE — 97535 SELF CARE MNGMENT TRAINING: CPT

## 2023-01-21 PROCEDURE — 83880 ASSAY OF NATRIURETIC PEPTIDE: CPT

## 2023-01-21 PROCEDURE — 2580000003 HC RX 258: Performed by: SURGERY

## 2023-01-21 PROCEDURE — 84443 ASSAY THYROID STIM HORMONE: CPT

## 2023-01-21 PROCEDURE — 97166 OT EVAL MOD COMPLEX 45 MIN: CPT

## 2023-01-21 PROCEDURE — 80053 COMPREHEN METABOLIC PANEL: CPT

## 2023-01-21 PROCEDURE — 6370000000 HC RX 637 (ALT 250 FOR IP): Performed by: STUDENT IN AN ORGANIZED HEALTH CARE EDUCATION/TRAINING PROGRAM

## 2023-01-21 PROCEDURE — 82728 ASSAY OF FERRITIN: CPT

## 2023-01-21 PROCEDURE — 84439 ASSAY OF FREE THYROXINE: CPT

## 2023-01-21 RX ORDER — SODIUM CHLORIDE, SODIUM LACTATE, POTASSIUM CHLORIDE, CALCIUM CHLORIDE 600; 310; 30; 20 MG/100ML; MG/100ML; MG/100ML; MG/100ML
INJECTION, SOLUTION INTRAVENOUS CONTINUOUS
Status: DISPENSED | OUTPATIENT
Start: 2023-01-21 | End: 2023-01-21

## 2023-01-21 RX ORDER — TIZANIDINE 4 MG/1
4 TABLET ORAL EVERY 6 HOURS
Status: DISCONTINUED | OUTPATIENT
Start: 2023-01-21 | End: 2023-01-22

## 2023-01-21 RX ORDER — OXYCODONE HYDROCHLORIDE 5 MG/1
5 TABLET ORAL EVERY 4 HOURS PRN
Status: DISCONTINUED | OUTPATIENT
Start: 2023-01-21 | End: 2023-01-23 | Stop reason: HOSPADM

## 2023-01-21 RX ORDER — OXYCODONE HYDROCHLORIDE 5 MG/1
5 TABLET ORAL EVERY 4 HOURS PRN
Status: DISCONTINUED | OUTPATIENT
Start: 2023-01-21 | End: 2023-01-21

## 2023-01-21 RX ORDER — LIDOCAINE 4 G/G
1 PATCH TOPICAL DAILY
Status: DISCONTINUED | OUTPATIENT
Start: 2023-01-21 | End: 2023-01-23 | Stop reason: HOSPADM

## 2023-01-21 RX ORDER — AMLODIPINE BESYLATE 5 MG/1
5 TABLET ORAL DAILY
Status: DISCONTINUED | OUTPATIENT
Start: 2023-01-21 | End: 2023-01-23 | Stop reason: HOSPADM

## 2023-01-21 RX ORDER — 0.9 % SODIUM CHLORIDE 0.9 %
1000 INTRAVENOUS SOLUTION INTRAVENOUS ONCE
Status: COMPLETED | OUTPATIENT
Start: 2023-01-21 | End: 2023-01-21

## 2023-01-21 RX ORDER — POTASSIUM CHLORIDE 7.45 MG/ML
10 INJECTION INTRAVENOUS
Status: COMPLETED | OUTPATIENT
Start: 2023-01-21 | End: 2023-01-21

## 2023-01-21 RX ORDER — OXYCODONE HYDROCHLORIDE 10 MG/1
10 TABLET ORAL EVERY 4 HOURS PRN
Status: DISCONTINUED | OUTPATIENT
Start: 2023-01-21 | End: 2023-01-23 | Stop reason: HOSPADM

## 2023-01-21 RX ORDER — METHOCARBAMOL 500 MG/1
750 TABLET, FILM COATED ORAL 3 TIMES DAILY
Status: DISCONTINUED | OUTPATIENT
Start: 2023-01-21 | End: 2023-01-21

## 2023-01-21 RX ORDER — POTASSIUM CHLORIDE 20 MEQ/1
40 TABLET, EXTENDED RELEASE ORAL ONCE
Status: DISCONTINUED | OUTPATIENT
Start: 2023-01-21 | End: 2023-01-21

## 2023-01-21 RX ADMIN — AMLODIPINE BESYLATE 5 MG: 5 TABLET ORAL at 01:13

## 2023-01-21 RX ADMIN — OXYCODONE HYDROCHLORIDE 5 MG: 5 TABLET ORAL at 08:10

## 2023-01-21 RX ADMIN — Medication 10 ML: at 08:17

## 2023-01-21 RX ADMIN — SODIUM CHLORIDE, POTASSIUM CHLORIDE, SODIUM LACTATE AND CALCIUM CHLORIDE: 600; 310; 30; 20 INJECTION, SOLUTION INTRAVENOUS at 01:13

## 2023-01-21 RX ADMIN — SODIUM CHLORIDE 1000 ML: 9 INJECTION, SOLUTION INTRAVENOUS at 13:19

## 2023-01-21 RX ADMIN — POTASSIUM CHLORIDE 10 MEQ: 10 INJECTION, SOLUTION INTRAVENOUS at 03:37

## 2023-01-21 RX ADMIN — Medication 10 ML: at 20:47

## 2023-01-21 RX ADMIN — TIZANIDINE 4 MG: 4 TABLET ORAL at 10:44

## 2023-01-21 RX ADMIN — ACETAMINOPHEN 650 MG: 325 TABLET ORAL at 20:48

## 2023-01-21 RX ADMIN — SODIUM CHLORIDE 500 ML: 9 INJECTION, SOLUTION INTRAVENOUS at 03:38

## 2023-01-21 RX ADMIN — OXYCODONE HYDROCHLORIDE 5 MG: 5 TABLET ORAL at 00:47

## 2023-01-21 RX ADMIN — ENOXAPARIN SODIUM 40 MG: 100 INJECTION SUBCUTANEOUS at 20:47

## 2023-01-21 RX ADMIN — POTASSIUM CHLORIDE 10 MEQ: 10 INJECTION, SOLUTION INTRAVENOUS at 04:45

## 2023-01-21 ASSESSMENT — ENCOUNTER SYMPTOMS
CHEST TIGHTNESS: 0
BACK PAIN: 1
COLOR CHANGE: 0
EYE PAIN: 0
BLOOD IN STOOL: 0
DIARRHEA: 0
ABDOMINAL PAIN: 0
VOMITING: 0
COUGH: 0
SINUS PRESSURE: 0
NAUSEA: 0
BACK PAIN: 0
SHORTNESS OF BREATH: 0
EYE DISCHARGE: 0
VOICE CHANGE: 0
SINUS PAIN: 0
EYE REDNESS: 0

## 2023-01-21 ASSESSMENT — PAIN SCALES - GENERAL
PAINLEVEL_OUTOF10: 5
PAINLEVEL_OUTOF10: 4
PAINLEVEL_OUTOF10: 7

## 2023-01-21 ASSESSMENT — PAIN DESCRIPTION - ORIENTATION
ORIENTATION: LEFT
ORIENTATION: LEFT

## 2023-01-21 ASSESSMENT — PAIN - FUNCTIONAL ASSESSMENT: PAIN_FUNCTIONAL_ASSESSMENT: ACTIVITIES ARE NOT PREVENTED

## 2023-01-21 ASSESSMENT — PAIN DESCRIPTION - DESCRIPTORS
DESCRIPTORS: ACHING;SHOOTING
DESCRIPTORS: ACHING

## 2023-01-21 ASSESSMENT — PAIN DESCRIPTION - LOCATION
LOCATION: RIB CAGE
LOCATION: RIB CAGE
LOCATION: BACK;RIB CAGE

## 2023-01-21 NOTE — PROGRESS NOTES
Occupational Therapy  [facility] ACUTE CARE OCCUPATIONAL THERAPY EVALUATION    Jaxon Estrada, 1950, [id number], 1/21/2023    Discharge Recommendation: Inpatient Rehabilitation      History:  [place]:  The primary encounter diagnosis was Closed nondisplaced fracture of phalanx of great toe, unspecified laterality, unspecified phalanx, initial encounter. Diagnoses of Fall, initial encounter, Musculoskeletal pain, Inclusion body myositis, Pleural effusion, and Anemia, unspecified type were also pertinent to this visit. Past Medical History:   Diagnosis Date    Abdominal aortic aneurysm (AAA) >39 mm diameter     Hypertension     IBS (irritable bowel syndrome)        Subjective:  Patient states: \"I had a bad morning. I got dizzy and SOB and now I have to wear oxygen\"  Pain:  reports 10/10 L side rib pain when coughing, mild bilat foot pain w/ WB but did not rate  Communication with other providers: co-eval w/ PT, handoff to RN  Restrictions: General Precautions, Fall Risk, bilat LE WBAT w/ post op shoes    Home Setup/Prior level of function:  Social/Functional History  Lives With: Spouse  Type of Home: House  Home Layout: Multi-level (7 steps upstairs - reports eventually installing chair lift)  Home Access: Stairs to enter without rails  Entrance Stairs - Number of Steps: 2  Bathroom Shower/Tub: Tub/Shower unit  Bathroom Toilet: Standard  Bathroom Equipment: Shower chair, Grab bars in shower, Toilet raiser  Home Equipment: Cane, Rollator, Reacher, Walker, rolling  Has the patient had two or more falls in the past year or any fall with injury in the past year?: Yes (reports ~10, \"no warning I just trip and go down\")  ADL Assistance: [facility] [facility] [address]:  (spouse completes mostly, pt occasionally prepares meals)  Ambulation Assistance: Independent (w/o AD in home, occasionally uses RW.  Uses RW w/ community mobility)  Transfer Assistance: Independent  Active : No Examination:  Observation: Supine in bed upon arrival, agreeable to therapy eval.  Vision: WFL  Hearing: WFL  Vitals: 2L O2. /74 while seated EOB. /93 standing      Body Systems and functions:  ROM: WFL   Strength: B UE grossly 4/5 across all major joints   Sensation: WFL  Tone: Normal  Coordination: WFL  Perception: WNL      Cognitive and Psychosocial Functioning:  Overall cognitive status: alert and oriented, WFL  Affect: Normal       Functional Mobility:  Bed mobility:  supine to sitting EOB w/ SBA, Sit to supine w/ min A to assist LEs  Sitting balance:  SBA    Transfers: STS to/from EOB w/ CGA  Standing balance:  CGA static and dynamic w/ RW  Functional Mobility: ambulated short functional household distance using RW w/ CGA  Toilet/Shower Transfers: NT        Activities of Daily Living (ADLs):  Feeding: set up A  Grooming: SBA  UB bathing: min A  LB bathing: mod A  UB dressing: min A  LB dressing: SBA to don L sock, max A to don R sock. Max A to don bilat post op shoes while seated EOB  Toileting: SBA    *ADL determined per observation of functional mobility, balance, activity tolerance, cognition, or actual ADL performance. AM-PAC 6 click short form for inpatient daily activity:   How much help from another person does the patient currently need. .. Unable  Dep A Lot  Max A A Lot   Mod A A Little  Min A A Little   CGA  SBA None   Mod I  Indep  Sup   1. Putting on and taking off regular lower body clothing? [] 1    [x] 2   [] 2   [] 3   [] 3   [] 4      2. Bathing (including washing, rinsing, drying)? [] 1   [] 2   [x] 2 [] 3 [] 3 [] 4   3. Toileting, which includes using toilet, bedpan, or urinal? [] 1    [] 2   [] 2   [] 3   [x] 3   [] 4     4. Putting on and taking off regular upper body clothing? [] 1   [] 2   [] 2   [x] 3   [] 3    [] 4      5. Taking care of personal grooming such as brushing teeth? [] 1   [] 2    [] 2 [] 3    [x] 3   [] 4      6. Eating meals?    [] 1   [] 2   [] 2   [] 3   [] 3   [x] 4        Raw Score:  17     [24=0% impaired(CH), 23=1-19%(CI), 20-22=20-39%(CJ), 15-19=40-59%(CK), 10-14=60-79%(CL), 7-9=80-99%(CM), 6=100%(CN)]     Treatment:    Self Care Training:   Cues were given for safety, sequence, UE/LE placement, visual cues, and balance. Activities performed today included LB dressing tasks    Educated pt on role of OT, therapy POC and functional goals, progression w/ ADLs and transfers, importance of movement and OOB activity, d/c recommendations     Safety Measures: Gait belt used, Left in bed per pt request, Alarm in place  Recommendations for NURSING activity:  Up to chair for all 3 meals and up to bathroom for all toileting needs     Assessment:  Pt is a 67 y o F admitted d/t fall at home. Dx w/ acute left 10th rib fracture with mild to moderate displacement + acute bilateral lower extremity phalangeal fractures. Pt at baseline is IND for ADLs, has assist for high level IADLs, and mod I for functional transfers/mobility w/o AD vs RW. Pt currently presents w/ deficits in ADL and high level IADL independence, functional ADL transfers, strength, and functional activity tolerance. Continued OT services recommended to increase safety and independence with ADL routine and to address remaining functional deficits. Pt would benefit from continued acute care OT services w/ discharge to ARU. Complexity: Moderate  Prognosis: Good, no significant barriers to participation at this time. Occupational Therapy Plan  Times Per Week: 3+  Current Treatment Recommendations: Strengthening, ROM, Endurance training, Functional mobility training, Safety education & training, Patient/Caregiver education & training, Pain management, Equipment evaluation, education, & procurement, Positioning, Self-Care / ADL, Home management training         Goals:  Pt will complete all aspects of bed mobility for EOB/OOB ADLs w/ mod I. Pt will complete UB ADLs w/ mod I.   Pt will complete LB ADLs w/ mod I. Pt will complete all functional transfers to and from bed, chair, toilet, shower chair w/ mod I. Pt will ambulate functional household distance w/ mod I. Pt will complete all aspects of toileting task w/ mod I. Pt will perform therex/theract in order to increase strength and functional activity tolerance necessary for increased independence w/ ADL routine.     Pt goal: go home, get stronger  Time Frame for STGs: discharge    Equipment: Continue to assess at next LOC    Time:   Time in: 1536  Time out: 1600  Total time: 24  Timed treatment minutes: 14        Electronically signed by:      Curtis Carmichael OTR/L  [id number]

## 2023-01-21 NOTE — CONSULTS
Consult to Orthopedic Surgery  Consult performed by: Adalid Junior DO  Consult ordered by: Bran Doran DO  Reason for consult: Bilateral foot fractures  Assessment/Recommendations:     Left great toe proximal phalanx fracture  Left second and third metatarsal neck fractures  Right great toe proximal phalanx fracture    I discussed with her today her x-ray findings. I explained to her that she does have multiple fractures in both of her feet which are in good alignment and will heal with conservative treatment. At this point she can either wear a postop shoe to both feet or a regular shoe as tolerated. She can begin weightbearing as tolerated on the bilateral lower extremities. No running, jumping, heavy lifting. Ice and elevate as needed. Tylenol or Motrin as needed. Orthopedically she is stable for discharge, follow-up in office in 6 weeks. 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 She states that she has sustained multiple falls the other day. She states the first fall she hit the left side of her rib cage and since that time she has had pain in that side. The second time she states that she was walking to bed when she fell and her toes curled underneath her. She had immediate pain in both of her feet and had more difficulty with ambulation so she came to the ED. Multiple x-rays were obtained, she was diagnosed with multiple fractures of both of her feet and she was admitted to the hospitalist for medical management and placement. I was then consulted for evaluation of her foot fractures. She is currently complaining of dull, aching pain in the toes of both of her feet as well as in her left forefoot. She also was having some pain in the right hindfoot. Patient denies any prior injury to the involved extremity/ joint, denies fever or chills.   She does have some chronic numbness in both of her feet as a result of her inclusion body myositis. Review of Systems   Constitutional:  Negative for activity change, chills and fever. HENT:  Negative for congestion and drooling. Eyes:  Negative for redness. Respiratory:  Negative for chest tightness. Cardiovascular:  Negative for chest pain. Gastrointestinal:  Negative for abdominal pain. Endocrine: Negative for cold intolerance and heat intolerance. Musculoskeletal:  Positive for arthralgias, gait problem, joint swelling and myalgias. Negative for back pain. Skin:  Negative for color change, pallor, rash and wound. Neurological:  Negative for weakness and numbness. Psychiatric/Behavioral:  Negative for confusion. Past Medical History:   Diagnosis Date    Abdominal aortic aneurysm (AAA) >39 mm diameter     Hypertension     IBS (irritable bowel syndrome)        No current facility-administered medications on file prior to encounter. Current Outpatient Medications on File Prior to Encounter   Medication Sig Dispense Refill    pantoprazole (PROTONIX) 40 MG tablet Take 40 mg by mouth daily (Patient not taking: Reported on 1/20/2023)      Cholecalciferol (VITAMIN D3) 2000 units CAPS Take 1 capsule by mouth daily (Patient not taking: Reported on 1/20/2023)      lisinopril (PRINIVIL;ZESTRIL) 10 MG tablet Take 20 mg by mouth daily        No Known Allergies  Past Surgical History:   Procedure Laterality Date    TONSILLECTOMY       Social History     Tobacco Use    Smoking status: Former    Smokeless tobacco: Never   Substance Use Topics    Alcohol use: Yes     Comment: occ    Drug use: No     Family History   Problem Relation Age of Onset    Hypertension Mother      Right Ankle Exam     Tenderness   The patient is experiencing no tenderness. Swelling: mild    Range of Motion   The patient has normal right ankle ROM. Muscle Strength   The patient has normal right ankle strength.     Other   Erythema: absent  Sensation: decreased  Pulse: present     Comments:  Right foot-moderate tenderness over the great toe, skin intact, mild ecchymosis. She is able to flex and extend her toes but decreased range of motion due to pain. Decreased sensation to all nerve distributions of the right foot. +2 DP  Compartment soft. Left Ankle Exam     Tenderness   The patient is experiencing no tenderness. Swelling: mild    Range of Motion   The patient has normal left ankle ROM. Muscle Strength   The patient has normal left ankle strength. Other   Erythema: absent  Sensation: decreased  Pulse: present    Comments:  Left foot-moderate tenderness over the left great toe as well as the bases of the second and third metatarsals, skin intact, moderate ecchymosis. She is able to flex and extend her toes but decreased range of motion due to pain. Decreased sensation to all nerve divisions of the left foot. +2 DP  Compartments soft. Right Knee Exam     Tenderness   The patient is experiencing no tenderness. Range of Motion   The patient has normal right knee ROM. Other   Erythema: absent  Sensation: normal  Pulse: present  Swelling: none  Effusion: no effusion present      Left Knee Exam     Tenderness   The patient is experiencing no tenderness. Range of Motion   The patient has normal left knee ROM. Other   Erythema: absent  Sensation: normal  Pulse: present  Swelling: none  Effusion: no effusion present          /74   Pulse 65   Temp 98.6 °F (37 °C) (Oral)   Resp 19   Ht 5' 4\" (1.626 m)   Wt 129 lb 1.6 oz (58.6 kg)   SpO2 92%   BMI 22.16 kg/m²     XR PELVIS (1-2 VIEWS)    Result Date: 1/20/2023  EXAMINATION: ONE XRAY VIEW OF THE CHEST; ONE XRAY VIEW OF THE PELVIS 1/20/2023 4:08 pm; 1/20/2023 4:09 pm COMPARISON: None.  HISTORY: ORDERING SYSTEM PROVIDED HISTORY: fall/rib pain TECHNOLOGIST PROVIDED HISTORY: Reason for exam:->fall/rib pain Reason for Exam: fall/rib pain; ORDERING SYSTEM PROVIDED HISTORY: trauma TECHNOLOGIST PROVIDED HISTORY: Reason for exam:->trauma Reason for Exam: fall Saturday, trauma pain FINDINGS: Chest x-ray: No pneumothorax or infiltrate is identified. There is a mild to moderate left pleural effusion. An underlying lung contusion or mass is not excluded. The heart size is within normal limits. Pelvic x-ray: No acute fracture or hip dislocation is identified. Degenerative changes of the lower lumbar spine are noted. Left pleural effusion. No fracture visualized. XR TIBIA FIBULA LEFT (2 VIEWS)    Result Date: 1/20/2023  EXAMINATION: XRAY VIEWS OF THE LEFT TIBIA AND FIBULA; THREE XRAY VIEWS OF THE LEFT FOOT; THREE XRAY VIEWS OF THE LEFT ANKLE; THREE XRAY VIEWS OF THE RIGHT ANKLE; THREE XRAY VIEWS OF THE RIGHT FOOT;   XRAY VIEWS OF THE RIGHT TIBIA AND FIBULA 1/20/2023 4:02 pm; 1/20/2023 4:08 pm; 1/20/2023 4:03 pm; 1/20/2023 4:06 pm; 1/20/2023 4:07 pm; 1/20/2023 4:05 pm COMPARISON: None. HISTORY: ORDERING SYSTEM PROVIDED HISTORY: fall TECHNOLOGIST PROVIDED HISTORY: Reason for exam:->fall Reason for Exam: fall last Saturday; ORDERING SYSTEM PROVIDED HISTORY: pain TECHNOLOGIST PROVIDED HISTORY: Reason for exam:->pain Reason for Exam: trauma, fall Saturday, pain; ORDERING SYSTEM PROVIDED HISTORY: trauma TECHNOLOGIST PROVIDED HISTORY: Reason for exam:->trauma Reason for Exam: trauma, fall Saturday; ORDERING SYSTEM PROVIDED HISTORY: trauma TECHNOLOGIST PROVIDED HISTORY: Reason for exam:->trauma Reason for Exam: trauma, fall Saturday, pain; ORDERING SYSTEM PROVIDED HISTORY: pain TECHNOLOGIST PROVIDED HISTORY: Right Foot Reason for exam:->pain Reason for Exam: trauma, fall Saturday, pain FINDINGS: Left lower leg: No acute fracture or dislocation is identified. Right lower leg: No acute fracture or dislocation is identified. Right ankle: No acute fracture or dislocation is identified. There is a plantar calcaneal spur. Left ankle: No acute fracture or dislocation is identified.  Right foot: There is an impacted fracture at the base of the 1st proximal phalanx and a chronic appearing fracture at the base of the 5th proximal phalanx, although clinical correlation is advised. Soft tissue swelling is noted along the dorsal aspect of the forefoot. Left foot: There appears to be a comminuted, mildly impacted intra-articular fracture at the base of the 1st proximal phalanx. There are also likely acute, mildly displaced fractures of the 2nd and 3rd metatarsal necks. No dislocation is identified. There is soft swelling along the dorsal aspect of the foot. Acute fractures of both feet. XR TIBIA FIBULA RIGHT (2 VIEWS)    Result Date: 1/20/2023  EXAMINATION: XRAY VIEWS OF THE LEFT TIBIA AND FIBULA; THREE XRAY VIEWS OF THE LEFT FOOT; THREE XRAY VIEWS OF THE LEFT ANKLE; THREE XRAY VIEWS OF THE RIGHT ANKLE; THREE XRAY VIEWS OF THE RIGHT FOOT;   XRAY VIEWS OF THE RIGHT TIBIA AND FIBULA 1/20/2023 4:02 pm; 1/20/2023 4:08 pm; 1/20/2023 4:03 pm; 1/20/2023 4:06 pm; 1/20/2023 4:07 pm; 1/20/2023 4:05 pm COMPARISON: None. HISTORY: ORDERING SYSTEM PROVIDED HISTORY: fall TECHNOLOGIST PROVIDED HISTORY: Reason for exam:->fall Reason for Exam: fall last Saturday; ORDERING SYSTEM PROVIDED HISTORY: pain TECHNOLOGIST PROVIDED HISTORY: Reason for exam:->pain Reason for Exam: trauma, fall Saturday, pain; ORDERING SYSTEM PROVIDED HISTORY: trauma TECHNOLOGIST PROVIDED HISTORY: Reason for exam:->trauma Reason for Exam: trauma, fall Saturday; ORDERING SYSTEM PROVIDED HISTORY: trauma TECHNOLOGIST PROVIDED HISTORY: Reason for exam:->trauma Reason for Exam: trauma, fall Saturday, pain; ORDERING SYSTEM PROVIDED HISTORY: pain TECHNOLOGIST PROVIDED HISTORY: Right Foot Reason for exam:->pain Reason for Exam: trauma, fall Saturday, pain FINDINGS: Left lower leg: No acute fracture or dislocation is identified. Right lower leg: No acute fracture or dislocation is identified. Right ankle: No acute fracture or dislocation is identified. There is a plantar calcaneal spur. Left ankle: No acute fracture or dislocation is identified. Right foot: There is an impacted fracture at the base of the 1st proximal phalanx and a chronic appearing fracture at the base of the 5th proximal phalanx, although clinical correlation is advised. Soft tissue swelling is noted along the dorsal aspect of the forefoot. Left foot: There appears to be a comminuted, mildly impacted intra-articular fracture at the base of the 1st proximal phalanx. There are also likely acute, mildly displaced fractures of the 2nd and 3rd metatarsal necks. No dislocation is identified. There is soft swelling along the dorsal aspect of the foot. Acute fractures of both feet. XR ANKLE LEFT (MIN 3 VIEWS)    Result Date: 1/20/2023  EXAMINATION: XRAY VIEWS OF THE LEFT TIBIA AND FIBULA; THREE XRAY VIEWS OF THE LEFT FOOT; THREE XRAY VIEWS OF THE LEFT ANKLE; THREE XRAY VIEWS OF THE RIGHT ANKLE; THREE XRAY VIEWS OF THE RIGHT FOOT;   XRAY VIEWS OF THE RIGHT TIBIA AND FIBULA 1/20/2023 4:02 pm; 1/20/2023 4:08 pm; 1/20/2023 4:03 pm; 1/20/2023 4:06 pm; 1/20/2023 4:07 pm; 1/20/2023 4:05 pm COMPARISON: None. HISTORY: ORDERING SYSTEM PROVIDED HISTORY: fall TECHNOLOGIST PROVIDED HISTORY: Reason for exam:->fall Reason for Exam: fall last Saturday; ORDERING SYSTEM PROVIDED HISTORY: pain TECHNOLOGIST PROVIDED HISTORY: Reason for exam:->pain Reason for Exam: trauma, fall Saturday, pain; ORDERING SYSTEM PROVIDED HISTORY: trauma TECHNOLOGIST PROVIDED HISTORY: Reason for exam:->trauma Reason for Exam: trauma, fall Saturday; ORDERING SYSTEM PROVIDED HISTORY: trauma TECHNOLOGIST PROVIDED HISTORY: Reason for exam:->trauma Reason for Exam: trauma, fall Saturday, pain; ORDERING SYSTEM PROVIDED HISTORY: pain TECHNOLOGIST PROVIDED HISTORY: Right Foot Reason for exam:->pain Reason for Exam: trauma, fall Saturday, pain FINDINGS: Left lower leg: No acute fracture or dislocation is identified.  Right lower leg: No acute fracture or dislocation is identified. Right ankle: No acute fracture or dislocation is identified. There is a plantar calcaneal spur. Left ankle: No acute fracture or dislocation is identified. Right foot: There is an impacted fracture at the base of the 1st proximal phalanx and a chronic appearing fracture at the base of the 5th proximal phalanx, although clinical correlation is advised. Soft tissue swelling is noted along the dorsal aspect of the forefoot. Left foot: There appears to be a comminuted, mildly impacted intra-articular fracture at the base of the 1st proximal phalanx. There are also likely acute, mildly displaced fractures of the 2nd and 3rd metatarsal necks. No dislocation is identified. There is soft swelling along the dorsal aspect of the foot. Acute fractures of both feet. XR ANKLE RIGHT (MIN 3 VIEWS)    Result Date: 1/20/2023  EXAMINATION: XRAY VIEWS OF THE LEFT TIBIA AND FIBULA; THREE XRAY VIEWS OF THE LEFT FOOT; THREE XRAY VIEWS OF THE LEFT ANKLE; THREE XRAY VIEWS OF THE RIGHT ANKLE; THREE XRAY VIEWS OF THE RIGHT FOOT;   XRAY VIEWS OF THE RIGHT TIBIA AND FIBULA 1/20/2023 4:02 pm; 1/20/2023 4:08 pm; 1/20/2023 4:03 pm; 1/20/2023 4:06 pm; 1/20/2023 4:07 pm; 1/20/2023 4:05 pm COMPARISON: None.  HISTORY: ORDERING SYSTEM PROVIDED HISTORY: fall TECHNOLOGIST PROVIDED HISTORY: Reason for exam:->fall Reason for Exam: fall last Saturday; ORDERING SYSTEM PROVIDED HISTORY: pain TECHNOLOGIST PROVIDED HISTORY: Reason for exam:->pain Reason for Exam: trauma, fall Saturday, pain; ORDERING SYSTEM PROVIDED HISTORY: trauma TECHNOLOGIST PROVIDED HISTORY: Reason for exam:->trauma Reason for Exam: trauma, fall Saturday; ORDERING SYSTEM PROVIDED HISTORY: trauma TECHNOLOGIST PROVIDED HISTORY: Reason for exam:->trauma Reason for Exam: trauma, fall Saturday, pain; ORDERING SYSTEM PROVIDED HISTORY: pain TECHNOLOGIST PROVIDED HISTORY: Right Foot Reason for exam:->pain Reason for Exam: trauma, fall Saturday, pain FINDINGS: Left lower leg: No acute fracture or dislocation is identified. Right lower leg: No acute fracture or dislocation is identified. Right ankle: No acute fracture or dislocation is identified. There is a plantar calcaneal spur. Left ankle: No acute fracture or dislocation is identified. Right foot: There is an impacted fracture at the base of the 1st proximal phalanx and a chronic appearing fracture at the base of the 5th proximal phalanx, although clinical correlation is advised. Soft tissue swelling is noted along the dorsal aspect of the forefoot. Left foot: There appears to be a comminuted, mildly impacted intra-articular fracture at the base of the 1st proximal phalanx. There are also likely acute, mildly displaced fractures of the 2nd and 3rd metatarsal necks. No dislocation is identified. There is soft swelling along the dorsal aspect of the foot. Acute fractures of both feet. XR FOOT LEFT (MIN 3 VIEWS)    Result Date: 1/20/2023  EXAMINATION: XRAY VIEWS OF THE LEFT TIBIA AND FIBULA; THREE XRAY VIEWS OF THE LEFT FOOT; THREE XRAY VIEWS OF THE LEFT ANKLE; THREE XRAY VIEWS OF THE RIGHT ANKLE; THREE XRAY VIEWS OF THE RIGHT FOOT;   XRAY VIEWS OF THE RIGHT TIBIA AND FIBULA 1/20/2023 4:02 pm; 1/20/2023 4:08 pm; 1/20/2023 4:03 pm; 1/20/2023 4:06 pm; 1/20/2023 4:07 pm; 1/20/2023 4:05 pm COMPARISON: None.  HISTORY: ORDERING SYSTEM PROVIDED HISTORY: fall TECHNOLOGIST PROVIDED HISTORY: Reason for exam:->fall Reason for Exam: fall last Saturday; ORDERING SYSTEM PROVIDED HISTORY: pain TECHNOLOGIST PROVIDED HISTORY: Reason for exam:->pain Reason for Exam: trauma, fall Saturday, pain; ORDERING SYSTEM PROVIDED HISTORY: trauma TECHNOLOGIST PROVIDED HISTORY: Reason for exam:->trauma Reason for Exam: trauma, fall Saturday; ORDERING SYSTEM PROVIDED HISTORY: trauma TECHNOLOGIST PROVIDED HISTORY: Reason for exam:->trauma Reason for Exam: trauma, fall Saturday, pain; ORDERING SYSTEM PROVIDED HISTORY: pain TECHNOLOGIST PROVIDED HISTORY: Right Foot Reason for exam:->pain Reason for Exam: trauma, fall Saturday, pain FINDINGS: Left lower leg: No acute fracture or dislocation is identified. Right lower leg: No acute fracture or dislocation is identified. Right ankle: No acute fracture or dislocation is identified. There is a plantar calcaneal spur. Left ankle: No acute fracture or dislocation is identified. Right foot: There is an impacted fracture at the base of the 1st proximal phalanx and a chronic appearing fracture at the base of the 5th proximal phalanx, although clinical correlation is advised. Soft tissue swelling is noted along the dorsal aspect of the forefoot. Left foot: There appears to be a comminuted, mildly impacted intra-articular fracture at the base of the 1st proximal phalanx. There are also likely acute, mildly displaced fractures of the 2nd and 3rd metatarsal necks. No dislocation is identified. There is soft swelling along the dorsal aspect of the foot. Acute fractures of both feet. XR FOOT RIGHT (MIN 3 VIEWS)    Result Date: 1/20/2023  EXAMINATION: XRAY VIEWS OF THE LEFT TIBIA AND FIBULA; THREE XRAY VIEWS OF THE LEFT FOOT; THREE XRAY VIEWS OF THE LEFT ANKLE; THREE XRAY VIEWS OF THE RIGHT ANKLE; THREE XRAY VIEWS OF THE RIGHT FOOT;   XRAY VIEWS OF THE RIGHT TIBIA AND FIBULA 1/20/2023 4:02 pm; 1/20/2023 4:08 pm; 1/20/2023 4:03 pm; 1/20/2023 4:06 pm; 1/20/2023 4:07 pm; 1/20/2023 4:05 pm COMPARISON: None.  HISTORY: ORDERING SYSTEM PROVIDED HISTORY: fall TECHNOLOGIST PROVIDED HISTORY: Reason for exam:->fall Reason for Exam: fall last Saturday; ORDERING SYSTEM PROVIDED HISTORY: pain TECHNOLOGIST PROVIDED HISTORY: Reason for exam:->pain Reason for Exam: trauma, fall Saturday, pain; ORDERING SYSTEM PROVIDED HISTORY: trauma TECHNOLOGIST PROVIDED HISTORY: Reason for exam:->trauma Reason for Exam: trauma, fall Saturday; ORDERING SYSTEM PROVIDED HISTORY: trauma TECHNOLOGIST PROVIDED HISTORY: Reason for exam:->trauma Reason for Exam: trauma, fall Saturday, pain; ORDERING SYSTEM PROVIDED HISTORY: pain TECHNOLOGIST PROVIDED HISTORY: Right Foot Reason for exam:->pain Reason for Exam: trauma, fall Saturday, pain FINDINGS: Left lower leg: No acute fracture or dislocation is identified. Right lower leg: No acute fracture or dislocation is identified. Right ankle: No acute fracture or dislocation is identified. There is a plantar calcaneal spur. Left ankle: No acute fracture or dislocation is identified. Right foot: There is an impacted fracture at the base of the 1st proximal phalanx and a chronic appearing fracture at the base of the 5th proximal phalanx, although clinical correlation is advised. Soft tissue swelling is noted along the dorsal aspect of the forefoot. Left foot: There appears to be a comminuted, mildly impacted intra-articular fracture at the base of the 1st proximal phalanx. There are also likely acute, mildly displaced fractures of the 2nd and 3rd metatarsal necks. No dislocation is identified. There is soft swelling along the dorsal aspect of the foot. Acute fractures of both feet.

## 2023-01-21 NOTE — PROGRESS NOTES
Physical Therapy  Facility/Department: [facility] 3E  Physical Therapy Initial Assessment    Name: Sadie Rubin  : 1950  MRN: [id number]  Date of Service: 2023    Discharge Recommendations:  IP Rehab              Assessment   Assessment: Pt is a 67 y.o. female with medical history, surgical history, co-morbidities, and personal factors including Abdominal aortic aneurysm (AAA) >39 mm diameter, Hypertension, IBS (irritable bowel syndrome), and Tonsillectomy with admission for Fall, Left great toe proximal phalanx fracture, Left second and third metatarsal neck fractures, Right great toe proximal phalanx fracture, Musculoskeletal pain, Inclusion body myositis, Pleural effusion, Acute fracture in the posterior left 10th rib with mild to moderate displacement, acute nondisplaced fracture of the posterior left 11th rib, and Anemia. Prior to admission, pt was independent with functional mobility and ADLs. Examination of body systems reveals decreased strength, decreased balance, decreased aerobic capacity, and decreased independence with functional mobility. Therapy Prognosis: Good  Decision Making: High Complexity  Clinical Presentation: unpredictable characteristics  Requires PT Follow-Up: Yes  Activity Tolerance  Activity Tolerance: Patient tolerated evaluation without incident     Plan   Physcial Therapy Plan  General Plan: 3-5 times per week  Current Treatment Recommendations: Strengthening, ROM, Balance training, Functional mobility training, Transfer training, ADL/Self-care training, IADL training, Cognitive/Perceptual training, Endurance training, Equipment evaluation, education, & procurement, Pain management, Gait training, Stair training, Positioning, Home exercise program, Neuromuscular re-education, Safety education & training, Therapeutic activities, Patient/Caregiver education & training  Safety Devices  Type of Devices:  All fall risk precautions in place, Patient at risk for falls, Bed alarm in place, Left in bed, Call light within reach, Gait belt, Nurse notified     Restrictions  Restrictions/Precautions  Restrictions/Precautions: General Precautions, Fall Risk, Weight Bearing  Lower Extremity Weight Bearing Restrictions  Right Lower Extremity Weight Bearing: Weight Bearing As Tolerated  Left Lower Extremity Weight Bearing: Weight Bearing As Tolerated  Position Activity Restriction  Other position/activity restrictions: can either wear a postop shoe to both feet or a regular shoe as tolerated per Dr. Cornelia Philippe: Yes  Patient assessed for rehabilitation services?: Yes  Family / Caregiver Present: Yes  Follows Commands: Within Functional Limits  Subjective  Subjective: pain: 10/10 left ribs and B feet         Social/Functional History  Social/Functional History  Lives With: Spouse  Type of Home: House  Home Layout: Multi-level (7 steps upstairs - reports eventually installing chair lift)  Home Access: Stairs to enter without rails  Entrance Stairs - Number of Steps: 2  Bathroom Shower/Tub: Tub/Shower unit  Bathroom Toilet: Standard  Bathroom Equipment: Shower chair, Grab bars in shower, Toilet raiser  Home Equipment: Cane, [address], [address], Walker, rolling  Has the patient had two or more falls in the past year or any fall with injury in the past year?: Yes (reports ~10, \"no warning I just trip and go down\")  ADL Assistance: [address] [place] [address] Avenue:  (spouse completes mostly, pt occasionally prepares meals)  Ambulation Assistance: Independent (w/o AD in home, occasionally uses RW.  Uses RW w/ community mobility)  Transfer Assistance: Independent  Active : No    Vision/Hearing  Vision  Vision: Within Functional Limits  Hearing  Hearing: Within functional limits    Cognition   Orientation  Overall Orientation Status: Within Functional Limits  Cognition  Overall Cognitive Status: Exceptions  Arousal/Alertness: Appropriate responses to stimuli  Following Commands: Follows all commands without difficulty  Attention Span: Appears intact  Safety Judgement: Decreased awareness of need for safety;Decreased awareness of need for assistance  Problem Solving: Decreased awareness of errors  Insights: Decreased awareness of deficits  Initiation: Requires cues for some  Sequencing: Requires cues for some     Objective           Gross Assessment  Sensation: Intact (BLEs)        Strength RLE  Comment: knee extension: at least 3+/5 observed functionally  Strength LLE  Comment: knee extension: at least 3+/5 observed functionally           Bed mobility  Supine to Sit: Contact guard assistance  Sit to Supine: Minimal assistance  Transfers  Sit to Stand: Contact guard assistance (with verbal cues to push through bed and avoid pulling on walker)  Stand to Sit: Contact guard assistance (with verbal cues to feel bed against back of legs, reach back, and sit slowly)  Ambulation  Surface: Level tile  Device: Rolling Walker  Assistance: Minimal assistance;Contact guard assistance  Quality of Gait: decreased gait speed, decreased step length bilaterally, antalgic, unsteady  Distance: 10 feet  Comments: with verbal and tactile cues for BLE placement, walker placement, and sequence throughout ambulation; with verbal and tactile cues to maintain upright posture in order to avoid COM shifting outside of [place]     Balance  Posture: Fair  Sitting - Static: Good  Sitting - Dynamic: Good  Standing - Static: Poor;+  Standing - Dynamic: Poor;+         Gait Training:  Cues were given for safety, sequence, device management, balance, posture, awareness, path. AM-PAC Score  -PAC Inpatient Mobility Raw Score : 16 (01/21/23 1750)  -PAC Inpatient T-Scale Score : 40.78 (01/21/23 1750)  Mobility Inpatient CMS 0-100% Score: 54.16 (01/21/23 1750)  Mobility Inpatient CMS G-Code Modifier : CK (01/21/23 1750)            Goals  Long Term Goals  Time Frame for Long Term Goals :  In one week:  Long Term Goal 1: Pt will complete all bed mobility with supervision  Long Term Goal 2: Pt will complete sit <> stand transfers with SBAx1  Long Term Goal 3: Pt will ambulate 75 feet with SBAx1 with LRAD  Long Term Goal 4: Pt will ascend/descend 6 steps with a handrail with minAx1  Long Term Goal 5: Pt will independently complete 3 sets of 10 reps of BLE AROM exercises in available and allowed ROM       Education  Patient Education  Education Given To: Patient; Family  Education Provided: Role of Therapy; Energy Conservation; Fall Prevention Strategies; Plan of Care;IADL Safety; ADL Adaptive Strategies; Equipment;Transfer Training;Precautions  Education Method: Demonstration;Verbal  Education Outcome: Verbalized understanding;Demonstrated understanding;Continued education needed      Time In: 1536  Time Out: 1600  Total Treatment Time: 24  Timed Code Treatment Minutes: 72537 Larry Warren PT, DPT  License #: [id number]

## 2023-01-21 NOTE — ED NOTES
ED TO INPATIENT SBAR HANDOFF    Patient Name: Esther Baeza   :  1950  67 y.o. MRN:  [id number]  Preferred Name    ED Room #:  ED33/ED-33  Family/Caregiver Present yes   Restraints no   Sitter no   Sepsis Risk Score Sepsis Risk Score: 1.5    Situation  Code Status: Full Code    Allergies: Patient has no known allergies. Weight: No data found. Arrived from: home  Chief Complaint:   Chief Complaint   Patient presents with    Fall     Fall x 2 last Saturday. Hospital Problem/Diagnosis:  Principal Problem:    Fall at home, initial encounter  Resolved Problems:    * No resolved hospital problems. *    Imaging:   XR PELVIS (1-2 VIEWS)   Final Result   Left pleural effusion. No fracture visualized. XR CHEST PORTABLE   Final Result   Left pleural effusion. No fracture visualized. XR FOOT LEFT (MIN 3 VIEWS)   Final Result   Acute fractures of both feet. XR FOOT RIGHT (MIN 3 VIEWS)   Final Result   Acute fractures of both feet. XR ANKLE RIGHT (MIN 3 VIEWS)   Final Result   Acute fractures of both feet. XR TIBIA FIBULA RIGHT (2 VIEWS)   Final Result   Acute fractures of both feet. XR ANKLE LEFT (MIN 3 VIEWS)   Final Result   Acute fractures of both feet. XR TIBIA FIBULA LEFT (2 VIEWS)   Final Result   Acute fractures of both feet.            Abnormal labs:   Abnormal Labs Reviewed   CBC WITH AUTO DIFFERENTIAL - Abnormal; Notable for the following components:       Result Value    WBC 3.9 (*)     RBC 2.75 (*)     Hemoglobin 8.7 (*)     Hematocrit 26.3 (*)     MCH 31.6 (*)     Platelets 885 (*)     Lymphocytes % 20.7 (*)     Monocytes % 11.2 (*)     All other components within normal limits   COMPREHENSIVE METABOLIC PANEL - Abnormal; Notable for the following components:    Sodium 134 (*)     Potassium 3.3 (*)     Chloride 96 (*)     BUN 34 (*)     Est, Glom Filt Rate 53 (*)     Glucose 101 (*)     All other components within normal limits     Critical values: no     Abnormal Assessment Findings:     Background  History:   Past Medical History:   Diagnosis Date   • Abdominal aortic aneurysm (AAA) >39 mm diameter    • Hypertension    • IBS (irritable bowel syndrome)        Assessment    Vitals/MEWS: MEWS Score: 1  Level of Consciousness: Alert (0)   Vitals:    01/20/23 1458 01/20/23 1700 01/20/23 1911   BP: (!) 174/84 (!) 153/91 (!) 173/93   Pulse: 84 85 93   Resp: 15 16 18   Temp: 98.7 °F (37.1 °C)  98.4 °F (36.9 °C)   TempSrc:   Oral   SpO2: 96% 94% 95%     FiO2 (%):   O2 Flow Rate:      Cardiac Rhythm:   Pain Assessment:  [x] Verbal [] Sykes Chan Scale  Pain Scale: Pain Assessment  Pain Level: 8  Last documented pain score (0-10 scale) Pain Level: 8  Last documented pain medication administered: NORCO  Mental Status: oriented  NIH Score: NIH     C-SSRS: Risk of Suicide: No Risk  Bedside swallow:    Thelma Coma Scale (GCS): Beatriz Coma Scale  Eye Opening: Spontaneous  Best Verbal Response: Oriented  Best Motor Response: Obeys commands  Thelma Coma Scale Score: 15  Active LDA's:    PO Status: Regular  Pertinent or High Risk Medications/Drips: no   o If Yes, please provide details:   Pending Blood Product Administration: no     You may also review the ED PT Care Timeline found under the Summary Nursing Index tab.    Recommendation    Pending orders   Plan for Discharge (if known):   Additional Comments:    If any further questions, please call Sending RN dinesh [facility]     Electronically signed by: Electronically signed by Jeffry Lee RN on 1/20/2023 at 7:33 PM       Jeffry Lee RN  01/20/23 1936

## 2023-01-21 NOTE — CARE COORDINATION
CM performed MCG review. Patient does not currently meet inpatient criteria. Observation only. PS message sent to Dr Guerline Christianson.

## 2023-01-21 NOTE — CARE COORDINATION
Case Management consult for assisted Living. Case Management does not assist patient with assisted living, however patient may benefit from short term rehab. Whiteboard placed asking for therapy to eval pt and provide recommendations.

## 2023-01-21 NOTE — PLAN OF CARE
Pt pivoted back to bed from commode and c/o shortness of breath, SaO2 88% on room air. Put on 2L NC O2 and improved to 95-98% and pt states improvement in breathing. Dr. Shonda Abdalla notified. He stated for patient to use incentive spirometer, and set I.S. goal slightly higher than where she is currently. Patient is currently at 750 with I.S. and RN set goal at 1000. Patient stated understanding. No new orders from physician.

## 2023-01-21 NOTE — PROGRESS NOTES
Patient refused to take the potassium pill stated that it is too large of a pill to take and even if in half - d/t her muscle disease that she has dysphagia and wont be able to take. She stated, \"that people dont understand that she can eat but that she cannot take big pills.\" Informed the physician about it.

## 2023-01-22 PROCEDURE — 2580000003 HC RX 258: Performed by: STUDENT IN AN ORGANIZED HEALTH CARE EDUCATION/TRAINING PROGRAM

## 2023-01-22 PROCEDURE — 6370000000 HC RX 637 (ALT 250 FOR IP): Performed by: NURSE PRACTITIONER

## 2023-01-22 PROCEDURE — 1200000000 HC SEMI PRIVATE

## 2023-01-22 PROCEDURE — 6370000000 HC RX 637 (ALT 250 FOR IP): Performed by: SURGERY

## 2023-01-22 PROCEDURE — 97530 THERAPEUTIC ACTIVITIES: CPT

## 2023-01-22 PROCEDURE — 97535 SELF CARE MNGMENT TRAINING: CPT

## 2023-01-22 PROCEDURE — 94761 N-INVAS EAR/PLS OXIMETRY MLT: CPT

## 2023-01-22 PROCEDURE — 6370000000 HC RX 637 (ALT 250 FOR IP): Performed by: STUDENT IN AN ORGANIZED HEALTH CARE EDUCATION/TRAINING PROGRAM

## 2023-01-22 PROCEDURE — 94150 VITAL CAPACITY TEST: CPT

## 2023-01-22 PROCEDURE — 6360000002 HC RX W HCPCS: Performed by: STUDENT IN AN ORGANIZED HEALTH CARE EDUCATION/TRAINING PROGRAM

## 2023-01-22 RX ORDER — TIZANIDINE 4 MG/1
4 TABLET ORAL EVERY 6 HOURS PRN
Status: DISCONTINUED | OUTPATIENT
Start: 2023-01-22 | End: 2023-01-23 | Stop reason: HOSPADM

## 2023-01-22 RX ORDER — LANOLIN ALCOHOL/MO/W.PET/CERES
3 CREAM (GRAM) TOPICAL NIGHTLY PRN
Status: DISCONTINUED | OUTPATIENT
Start: 2023-01-22 | End: 2023-01-23 | Stop reason: HOSPADM

## 2023-01-22 RX ADMIN — ACETAMINOPHEN 650 MG: 325 TABLET ORAL at 21:54

## 2023-01-22 RX ADMIN — TIZANIDINE 4 MG: 4 TABLET ORAL at 16:02

## 2023-01-22 RX ADMIN — Medication 10 ML: at 21:56

## 2023-01-22 RX ADMIN — Medication 3 MG: at 21:54

## 2023-01-22 RX ADMIN — ENOXAPARIN SODIUM 40 MG: 100 INJECTION SUBCUTANEOUS at 19:54

## 2023-01-22 RX ADMIN — OXYCODONE HYDROCHLORIDE 5 MG: 5 TABLET ORAL at 19:54

## 2023-01-22 RX ADMIN — Medication 3 MG: at 00:40

## 2023-01-22 RX ADMIN — TIZANIDINE 4 MG: 4 TABLET ORAL at 10:32

## 2023-01-22 RX ADMIN — AMLODIPINE BESYLATE 5 MG: 5 TABLET ORAL at 10:32

## 2023-01-22 RX ADMIN — Medication 10 ML: at 09:44

## 2023-01-22 ASSESSMENT — PAIN DESCRIPTION - LOCATION
LOCATION: BACK
LOCATION: BACK
LOCATION: RIB CAGE
LOCATION: RIB CAGE

## 2023-01-22 ASSESSMENT — PAIN SCALES - GENERAL
PAINLEVEL_OUTOF10: 0
PAINLEVEL_OUTOF10: 5
PAINLEVEL_OUTOF10: 7
PAINLEVEL_OUTOF10: 5
PAINLEVEL_OUTOF10: 0
PAINLEVEL_OUTOF10: 8

## 2023-01-22 ASSESSMENT — PAIN DESCRIPTION - DESCRIPTORS
DESCRIPTORS: ACHING

## 2023-01-22 ASSESSMENT — PAIN DESCRIPTION - ORIENTATION
ORIENTATION: LEFT
ORIENTATION: RIGHT;LEFT
ORIENTATION: LEFT
ORIENTATION: LEFT

## 2023-01-22 ASSESSMENT — PAIN - FUNCTIONAL ASSESSMENT
PAIN_FUNCTIONAL_ASSESSMENT: PREVENTS OR INTERFERES SOME ACTIVE ACTIVITIES AND ADLS
PAIN_FUNCTIONAL_ASSESSMENT: ACTIVITIES ARE NOT PREVENTED

## 2023-01-22 NOTE — PROGRESS NOTES
Occupational Therapy    Occupational Therapy Treatment Note    Name: Ottoniel Perez MRN: [id number] :   1950   Date:  2023   Admission Date: 2023 Room:  [address] [place] [address]       Restrictions/Precautions:  Restrictions/Precautions  Restrictions/Precautions: General Precautions, Fall Risk, Weight Bearing Position Activity Restriction  Other position/activity restrictions: can either wear a postop shoe to both feet or a regular shoe as tolerated per Dr. April Rosas Weight Bearing Restrictions  Right Lower Extremity Weight Bearing: Weight Bearing As Tolerated  Left Lower Extremity Weight Bearing: Weight Bearing As Tolerated       Communication with other providers: handoff to RN    Subjective:  Patient states: \"If it wasn't for these ribs I'd be in pretty good shape actually\"  Pain:   Location, Type, Intensity (0/10 to 10/10):  7/10 rib pain, mild pain in bilat feet but did not rate    Objective:    Observation: supine in bed, agreeable   Objective Measures:  vitals stable on room air    Treatment, including education:    Self Care Training:   Cues were given for safety, sequence, UE/LE placement, visual cues, and balance. Activities performed today included UB/LB dressing tasks, toileting, hand hygiene at sink    Brushed teeth while standing at the sink w/ SBA. Toilet transfer stand to sit to standard toilet w/ CGA, Vcs for sequencing hand placement. STS from standard toilet w/ min A d/t low height. BSC placed over standard toilet to inc height and provide B UE support in order to inc independence and safety with toilet transfers. Toilet hygiene w/ SBA, clothing mgmt CGA while standing. Stood at sink for hand hygiene w/ SBA. Brushed hair while seated w/ set up A. Therapeutic Activity Training:   Therapeutic activity training was instructed today. Cues were given for safety, sequence, UE/LE placement, awareness, and balance.     Activities performed today included bed mobility training, sup-sit, sit-stand, ambulation. Supine to sitting EOB w/ SBA. STS from EOB w/ SBA. Static standing ~3 min during sink level ADLs w/ SBA. Ambulated short functional household distance x2 bouts using RW w/ CGA. Stand to sit to recliner w/ SBA. Left seated in recliner, all needs met. Alarm on. Assessment / Impression:    Patient's tolerance of treatment: Well  Adverse Reaction: None  Significant change in status and impact: Improved from initial evaluation  Barriers to improvement: Weakness, activity tolerance, comorbidities      Plan for Next Session:    Continue w/ OT POC and functional goals, address safety/independence w/ ADLs and transfers/mobility.        Time in:  1045  Time out:  1129  Timed treatment minutes:  44  Total treatment time:  44      Electronically signed by:    Patricia Burnett OT,   1/22/2023, 10:59 AM

## 2023-01-22 NOTE — CONSULTS
Cardiothoracic Surgery     History & Physical      2023    Patient Name: Joe Jules : 1950     ATTENDING PHYSICIAN:   Dr. Ginna Rojo MD    REFERRING PHYSICIAN: Dr. Kristan Leal: Rib fractures 2/2 mechanical fall at home    PULMONOLOGIST: none    MEDICAL ONCOLOGIST: none    RADIATION ONCOLOGIST: none    CARDIOLOGIST: none     CC:    Rib fractures     HPI  Joe Jules is a 67 y.o. female with PMH of HTN, AAA(followed by Dr Nimesh Harrison), inclusion body myositis (follows at [facility]) who presented to the ED on 2023 after sustaining 2 mechanical falls at home. Per patient, the inclusion body myositis causes her BLE weakness and she often falls at home. She reports falling a few days ago and hitting her rib cage but did not come to the ED at that time. She again fell a couple days later and hurt both feet, so she came into the ED for further evaluation. Workup shows R 10/11 rib fracture. She also has Bilateral foot fractures for which orthopedic surgery is managing nonoperatively. CT surgery consulted for further recommendations regarding rib fractures.     Patient has remained stable on 2L NC this admission    PICC Score:     PICC SCORE    PAIN INSPIRATION COUGH   3 - Controlled 4 - Above Goal Volume 3 - Strong   2 - Moderate 3 - Goal to Alert Volume 2 - Weak   1 - Severe 2 - Below Alert Volume 1 - Absent    1 - Unable to perform IS       TOTAL: 7           PMHx  Past Medical History:   Diagnosis Date    Abdominal aortic aneurysm (AAA) >39 mm diameter     Hypertension     IBS (irritable bowel syndrome)        PSHx  Past Surgical History:   Procedure Laterality Date    TONSILLECTOMY         Social Hx  Social History     Socioeconomic History    Marital status:      Spouse name: Not on file    Number of children: Not on file    Years of education: Not on file    Highest education level: Not on file   Occupational History    Not on file   Tobacco Use    Smoking status: Former    Smokeless tobacco: Never   Substance and Sexual Activity    Alcohol use: Yes     Comment: occ    Drug use: No    Sexual activity: Not on file   Other Topics Concern    Not on file   Social History Narrative    Not on file     Social Determinants of Health     Financial Resource Strain: Not on file   Food Insecurity: Not on file   Transportation Needs: Not on file   Physical Activity: Not on file   Stress: Not on file   Social Connections: Not on file   Intimate Partner Violence: Not on file   Housing Stability: Not on file       FHx  Family History   Problem Relation Age of Onset    Hypertension Mother         Allergies  No Known Allergies    Current Medications    Current Facility-Administered Medications:     melatonin tablet 3 mg, 3 mg, Oral, Nightly PRN, Viridiana Sinha APRN - CNP, 3 mg at 01/22/23 0040    lidocaine 4 % external patch 1 patch, 1 patch, TransDERmal, Daily, Tin Pena MD, 1 patch at 01/21/23 0810    [Held by provider] amLODIPine (NORVASC) tablet 5 mg, 5 mg, Oral, Daily, Tin Pena MD, 5 mg at 01/21/23 0113    oxyCODONE (ROXICODONE) immediate release tablet 5 mg, 5 mg, Oral, Q4H PRN **OR** oxyCODONE HCl (OXY-IR) immediate release tablet 10 mg, 10 mg, Oral, Q4H PRN, Maurice Kruse MD    [Held by provider] tiZANidine (ZANAFLEX) tablet 4 mg, 4 mg, Oral, Q6H, Maurice Kruse MD, 4 mg at 01/21/23 1044    sodium chloride flush 0.9 % injection 5-40 mL, 5-40 mL, IntraVENous, 2 times per day, Tin Pena MD, 10 mL at 01/22/23 0944    sodium chloride flush 0.9 % injection 5-40 mL, 5-40 mL, IntraVENous, PRN, Tin Pena MD    0.9 % sodium chloride infusion, 500 mL, IntraVENous, PRN, Tin Pena MD, Last Rate: 20 mL/hr at 01/21/23 0338, 500 mL at 01/21/23 0338    enoxaparin (LOVENOX) injection 40 mg, 40 mg, SubCUTAneous, Nightly, Tin Pena MD, 40 mg at 01/21/23 2047    ondansetron (ZOFRAN-ODT) disintegrating tablet 4 mg, 4 mg, Oral, Q8H PRN **OR** ondansetron (ZOFRAN)  injection 4 mg, 4 mg, IntraVENous, Q6H PRN, Alen Melendez MD    polyethylene glycol (GLYCOLAX) packet 17 g, 17 g, Oral, Daily PRN, Alen Melendez MD    acetaminophen (TYLENOL) tablet 650 mg, 650 mg, Oral, Q6H PRN, 650 mg at 01/21/23 2048 **OR** acetaminophen (TYLENOL) suppository 650 mg, 650 mg, Rectal, Q6H PRN, Alen Melendez MD    potassium chloride (KLOR-CON M) extended release tablet 40 mEq, 40 mEq, Oral, PRN **OR** potassium bicarb-citric acid (EFFER-K) effervescent tablet 40 mEq, 40 mEq, Oral, PRN **OR** potassium chloride 10 mEq/100 mL IVPB (Peripheral Line), 10 mEq, IntraVENous, PRN, Alen Melendez MD    magnesium sulfate 2000 mg in 50 mL IVPB premix, 2,000 mg, IntraVENous, PRN, Alen Melendez MD    Review of Systems    GENERAL: negative  HEENT: negative  PULMONARY: +SOB, rib pain  CARDIOVASCULAR: negative  GI: negative  :negative  MUSCULOSKELETAL: +rib pain, Bilateral foot fractures/pain  NEURO: negative  INTEGUMENTARY: negative  PSYCHIATRIC: negative  ENDOCRINE:  negative  HEMATOLOGIC: negative      Exam  Vital Signs:  Vitals:    01/22/23 0930   BP: (!) 178/92   Pulse: 79   Resp: 18   Temp: 98.6 °F (37 °C)   SpO2:      GEN:  WDWN, NAD, pleasant and conversational  Neck:  supple, no carotid bruits, no JVD  ENT: LOCO  Chest: equal excursion  Lung:  CTAB, tenderness over L ribs  Heart: RRR, no murmur  Abd: soft, NT,ND, +BS  Ext: normal ROM, no BLE edema  Neuro: no focal deficits  Skin: Warm and dry, no rashes or lesions       Radiological Data:  CT chest shows left posterior rib 10/11 fractures with mild to moderate displacement    PFTs:  none    Stage (Pretreatment ):  None     Preoperative Chemotherapy/ Immunotherapy:  none    Preoperative Thoracic Radiation:  none    ECOG Score:  2        Assessment:  Left posterior rib fractures 10/11    Plan  Recommend excellent pain control: Lidocaine patch, Try to avoid narcotics if possible.    Encourage frequent IS  DC planning for SNF/C recommended. No surgical intervention required. Thank you for the consult! CT surgery will sign off. Please call/page with any questions.     Vianca Bell PA-C 01/22/23 10:08 AM          New Consults 8:00AM-4:30PM: Call Office , 4:30PM to 8:00AM Surgeon on-call    HVICU or other units patient follow up: Secure chat author of this note 8:00AM-4:30PM    HVICU patient follow up: 4:30PM to 8:00AM Call or Page Surgeon on-call,     Step-down patient follow up: 4:30PM to 8:00AM Page or secure chat PA on-call
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
## 2023-01-23 ENCOUNTER — HOSPITAL ENCOUNTER (INPATIENT)
Age: 73
DRG: 546 | End: 2023-01-23
Attending: PHYSICAL MEDICINE & REHABILITATION | Admitting: PHYSICAL MEDICINE & REHABILITATION
Payer: MEDICARE

## 2023-01-23 VITALS
WEIGHT: 129.1 LBS | BODY MASS INDEX: 22.04 KG/M2 | TEMPERATURE: 98.2 F | RESPIRATION RATE: 18 BRPM | HEART RATE: 77 BPM | HEIGHT: 64 IN | DIASTOLIC BLOOD PRESSURE: 74 MMHG | OXYGEN SATURATION: 95 % | SYSTOLIC BLOOD PRESSURE: 152 MMHG

## 2023-01-23 DIAGNOSIS — G72.41 INCLUSION BODY MYOSITIS: Primary | ICD-10-CM

## 2023-01-23 LAB
FERRITIN: 200 NG/ML (ref 15–150)
FOLATE: 10.6 NG/ML (ref 3.1–17.5)
IRON: 59 UG/DL (ref 37–145)
PCT TRANSFERRIN: 24 % (ref 10–44)
PRO-BNP: 357.4 PG/ML
SARS-COV-2, NAAT: NOT DETECTED
SOURCE: NORMAL
T4 FREE: 1.3 NG/DL (ref 0.9–1.8)
TOTAL IRON BINDING CAPACITY: 244 UG/DL (ref 250–450)
TROPONIN T: 0.59 NG/ML
TSH HIGH SENSITIVITY: 2.55 UIU/ML (ref 0.27–4.2)
UNSATURATED IRON BINDING CAPACITY: 185 UG/DL (ref 110–370)
VITAMIN B-12: >2000 PG/ML (ref 211–911)

## 2023-01-23 PROCEDURE — 97535 SELF CARE MNGMENT TRAINING: CPT

## 2023-01-23 PROCEDURE — 6370000000 HC RX 637 (ALT 250 FOR IP): Performed by: SURGERY

## 2023-01-23 PROCEDURE — 1280000000 HC REHAB R&B

## 2023-01-23 PROCEDURE — 94150 VITAL CAPACITY TEST: CPT

## 2023-01-23 PROCEDURE — 94761 N-INVAS EAR/PLS OXIMETRY MLT: CPT

## 2023-01-23 PROCEDURE — 6360000002 HC RX W HCPCS: Performed by: STUDENT IN AN ORGANIZED HEALTH CARE EDUCATION/TRAINING PROGRAM

## 2023-01-23 PROCEDURE — 2580000003 HC RX 258: Performed by: STUDENT IN AN ORGANIZED HEALTH CARE EDUCATION/TRAINING PROGRAM

## 2023-01-23 PROCEDURE — 6370000000 HC RX 637 (ALT 250 FOR IP): Performed by: STUDENT IN AN ORGANIZED HEALTH CARE EDUCATION/TRAINING PROGRAM

## 2023-01-23 PROCEDURE — 97530 THERAPEUTIC ACTIVITIES: CPT

## 2023-01-23 PROCEDURE — 99223 1ST HOSP IP/OBS HIGH 75: CPT | Performed by: PHYSICAL MEDICINE & REHABILITATION

## 2023-01-23 PROCEDURE — 6370000000 HC RX 637 (ALT 250 FOR IP): Performed by: PHYSICAL MEDICINE & REHABILITATION

## 2023-01-23 PROCEDURE — 97116 GAIT TRAINING THERAPY: CPT

## 2023-01-23 PROCEDURE — 87635 SARS-COV-2 COVID-19 AMP PRB: CPT

## 2023-01-23 RX ORDER — POTASSIUM CHLORIDE 7.45 MG/ML
10 INJECTION INTRAVENOUS PRN
Status: CANCELLED | OUTPATIENT
Start: 2023-01-23

## 2023-01-23 RX ORDER — SODIUM CHLORIDE 0.9 % (FLUSH) 0.9 %
5-40 SYRINGE (ML) INJECTION EVERY 12 HOURS SCHEDULED
Status: CANCELLED | OUTPATIENT
Start: 2023-01-23

## 2023-01-23 RX ORDER — POTASSIUM CHLORIDE 7.45 MG/ML
10 INJECTION INTRAVENOUS PRN
Status: DISCONTINUED | OUTPATIENT
Start: 2023-01-23 | End: 2023-01-23

## 2023-01-23 RX ORDER — OXYCODONE HYDROCHLORIDE 10 MG/1
10 TABLET ORAL EVERY 4 HOURS PRN
Status: CANCELLED | OUTPATIENT
Start: 2023-01-23

## 2023-01-23 RX ORDER — LANOLIN ALCOHOL/MO/W.PET/CERES
3 CREAM (GRAM) TOPICAL NIGHTLY PRN
Status: CANCELLED | OUTPATIENT
Start: 2023-01-23

## 2023-01-23 RX ORDER — ACETAMINOPHEN 325 MG/1
650 TABLET ORAL EVERY 6 HOURS PRN
Status: CANCELLED | OUTPATIENT
Start: 2023-01-23

## 2023-01-23 RX ORDER — OXYCODONE HYDROCHLORIDE 5 MG/1
5 TABLET ORAL EVERY 4 HOURS PRN
Status: CANCELLED | OUTPATIENT
Start: 2023-01-23

## 2023-01-23 RX ORDER — OXYCODONE HYDROCHLORIDE 10 MG/1
10 TABLET ORAL EVERY 4 HOURS PRN
Status: DISCONTINUED | OUTPATIENT
Start: 2023-01-23 | End: 2023-01-31

## 2023-01-23 RX ORDER — POTASSIUM CHLORIDE 20 MEQ/1
40 TABLET, EXTENDED RELEASE ORAL PRN
Status: DISCONTINUED | OUTPATIENT
Start: 2023-01-23 | End: 2023-01-23

## 2023-01-23 RX ORDER — BISACODYL 10 MG
10 SUPPOSITORY, RECTAL RECTAL DAILY PRN
Status: DISCONTINUED | OUTPATIENT
Start: 2023-01-23 | End: 2023-02-01 | Stop reason: HOSPADM

## 2023-01-23 RX ORDER — TIZANIDINE 4 MG/1
4 TABLET ORAL EVERY 6 HOURS PRN
Status: CANCELLED | OUTPATIENT
Start: 2023-01-23

## 2023-01-23 RX ORDER — SODIUM CHLORIDE 0.9 % (FLUSH) 0.9 %
5-40 SYRINGE (ML) INJECTION EVERY 12 HOURS SCHEDULED
Status: DISCONTINUED | OUTPATIENT
Start: 2023-01-23 | End: 2023-01-24

## 2023-01-23 RX ORDER — ONDANSETRON 2 MG/ML
4 INJECTION INTRAMUSCULAR; INTRAVENOUS EVERY 6 HOURS PRN
Status: CANCELLED | OUTPATIENT
Start: 2023-01-23

## 2023-01-23 RX ORDER — LIDOCAINE 4 G/G
1 PATCH TOPICAL DAILY
Status: CANCELLED | OUTPATIENT
Start: 2023-01-24

## 2023-01-23 RX ORDER — ACETAMINOPHEN 650 MG/1
650 SUPPOSITORY RECTAL EVERY 6 HOURS PRN
Status: CANCELLED | OUTPATIENT
Start: 2023-01-23

## 2023-01-23 RX ORDER — AMLODIPINE BESYLATE 5 MG/1
5 TABLET ORAL DAILY
Status: DISCONTINUED | OUTPATIENT
Start: 2023-01-24 | End: 2023-02-01 | Stop reason: HOSPADM

## 2023-01-23 RX ORDER — OXYCODONE HYDROCHLORIDE 5 MG/1
5 TABLET ORAL EVERY 4 HOURS PRN
Status: DISCONTINUED | OUTPATIENT
Start: 2023-01-23 | End: 2023-01-31

## 2023-01-23 RX ORDER — POLYETHYLENE GLYCOL 3350 17 G/17G
17 POWDER, FOR SOLUTION ORAL DAILY PRN
Status: CANCELLED | OUTPATIENT
Start: 2023-01-23

## 2023-01-23 RX ORDER — LIDOCAINE 4 G/G
1 PATCH TOPICAL DAILY
Status: DISCONTINUED | OUTPATIENT
Start: 2023-01-24 | End: 2023-02-01 | Stop reason: HOSPADM

## 2023-01-23 RX ORDER — SODIUM CHLORIDE 9 MG/ML
500 INJECTION, SOLUTION INTRAVENOUS PRN
Status: CANCELLED | OUTPATIENT
Start: 2023-01-23

## 2023-01-23 RX ORDER — ENOXAPARIN SODIUM 100 MG/ML
40 INJECTION SUBCUTANEOUS NIGHTLY
Status: CANCELLED | OUTPATIENT
Start: 2023-01-23

## 2023-01-23 RX ORDER — DOCUSATE SODIUM 100 MG/1
100 CAPSULE, LIQUID FILLED ORAL DAILY
Status: DISCONTINUED | OUTPATIENT
Start: 2023-01-24 | End: 2023-02-01 | Stop reason: HOSPADM

## 2023-01-23 RX ORDER — LANOLIN ALCOHOL/MO/W.PET/CERES
3 CREAM (GRAM) TOPICAL NIGHTLY PRN
Status: DISCONTINUED | OUTPATIENT
Start: 2023-01-23 | End: 2023-02-01 | Stop reason: HOSPADM

## 2023-01-23 RX ORDER — AMLODIPINE BESYLATE 5 MG/1
5 TABLET ORAL DAILY
Status: CANCELLED | OUTPATIENT
Start: 2023-01-24

## 2023-01-23 RX ORDER — SODIUM CHLORIDE 0.9 % (FLUSH) 0.9 %
5-40 SYRINGE (ML) INJECTION PRN
Status: DISCONTINUED | OUTPATIENT
Start: 2023-01-23 | End: 2023-02-01 | Stop reason: HOSPADM

## 2023-01-23 RX ORDER — ONDANSETRON 4 MG/1
4 TABLET, ORALLY DISINTEGRATING ORAL EVERY 8 HOURS PRN
Status: DISCONTINUED | OUTPATIENT
Start: 2023-01-23 | End: 2023-02-01 | Stop reason: HOSPADM

## 2023-01-23 RX ORDER — MAGNESIUM SULFATE IN WATER 40 MG/ML
2000 INJECTION, SOLUTION INTRAVENOUS PRN
Status: CANCELLED | OUTPATIENT
Start: 2023-01-23

## 2023-01-23 RX ORDER — TIZANIDINE 4 MG/1
4 TABLET ORAL EVERY 6 HOURS PRN
Status: DISCONTINUED | OUTPATIENT
Start: 2023-01-23 | End: 2023-02-01 | Stop reason: HOSPADM

## 2023-01-23 RX ORDER — ACETAMINOPHEN 325 MG/1
650 TABLET ORAL EVERY 6 HOURS PRN
Status: DISCONTINUED | OUTPATIENT
Start: 2023-01-23 | End: 2023-01-25

## 2023-01-23 RX ORDER — SODIUM CHLORIDE 0.9 % (FLUSH) 0.9 %
5-40 SYRINGE (ML) INJECTION PRN
Status: CANCELLED | OUTPATIENT
Start: 2023-01-23

## 2023-01-23 RX ORDER — ACETAMINOPHEN 650 MG/1
650 SUPPOSITORY RECTAL EVERY 6 HOURS PRN
Status: DISCONTINUED | OUTPATIENT
Start: 2023-01-23 | End: 2023-01-23

## 2023-01-23 RX ORDER — ONDANSETRON 2 MG/ML
4 INJECTION INTRAMUSCULAR; INTRAVENOUS EVERY 6 HOURS PRN
Status: DISCONTINUED | OUTPATIENT
Start: 2023-01-23 | End: 2023-01-31

## 2023-01-23 RX ORDER — POLYETHYLENE GLYCOL 3350 17 G/17G
17 POWDER, FOR SOLUTION ORAL DAILY PRN
Status: DISCONTINUED | OUTPATIENT
Start: 2023-01-23 | End: 2023-02-01 | Stop reason: HOSPADM

## 2023-01-23 RX ORDER — SODIUM CHLORIDE 9 MG/ML
500 INJECTION, SOLUTION INTRAVENOUS PRN
Status: DISCONTINUED | OUTPATIENT
Start: 2023-01-23 | End: 2023-01-24

## 2023-01-23 RX ORDER — ENOXAPARIN SODIUM 100 MG/ML
40 INJECTION SUBCUTANEOUS NIGHTLY
Status: DISCONTINUED | OUTPATIENT
Start: 2023-01-23 | End: 2023-01-31

## 2023-01-23 RX ORDER — SENNA PLUS 8.6 MG/1
1 TABLET ORAL NIGHTLY
Status: DISCONTINUED | OUTPATIENT
Start: 2023-01-23 | End: 2023-02-01 | Stop reason: HOSPADM

## 2023-01-23 RX ORDER — ONDANSETRON 4 MG/1
4 TABLET, ORALLY DISINTEGRATING ORAL EVERY 8 HOURS PRN
Status: CANCELLED | OUTPATIENT
Start: 2023-01-23

## 2023-01-23 RX ORDER — MAGNESIUM SULFATE IN WATER 40 MG/ML
2000 INJECTION, SOLUTION INTRAVENOUS PRN
Status: DISCONTINUED | OUTPATIENT
Start: 2023-01-23 | End: 2023-01-23

## 2023-01-23 RX ORDER — POTASSIUM CHLORIDE 20 MEQ/1
40 TABLET, EXTENDED RELEASE ORAL PRN
Status: CANCELLED | OUTPATIENT
Start: 2023-01-23

## 2023-01-23 RX ADMIN — OXYCODONE HYDROCHLORIDE 10 MG: 10 TABLET ORAL at 22:40

## 2023-01-23 RX ADMIN — SENNOSIDES 8.6 MG: 8.6 TABLET, COATED ORAL at 23:17

## 2023-01-23 RX ADMIN — ENOXAPARIN SODIUM 40 MG: 100 INJECTION SUBCUTANEOUS at 19:56

## 2023-01-23 RX ADMIN — OXYCODONE HYDROCHLORIDE 5 MG: 5 TABLET ORAL at 09:35

## 2023-01-23 RX ADMIN — Medication 10 ML: at 10:15

## 2023-01-23 RX ADMIN — SODIUM CHLORIDE, PRESERVATIVE FREE 10 ML: 5 INJECTION INTRAVENOUS at 20:13

## 2023-01-23 RX ADMIN — Medication 3 MG: at 22:40

## 2023-01-23 RX ADMIN — OXYCODONE HYDROCHLORIDE 5 MG: 5 TABLET ORAL at 16:31

## 2023-01-23 RX ADMIN — AMLODIPINE BESYLATE 5 MG: 5 TABLET ORAL at 09:35

## 2023-01-23 ASSESSMENT — PAIN DESCRIPTION - PAIN TYPE: TYPE: ACUTE PAIN

## 2023-01-23 ASSESSMENT — PAIN SCALES - GENERAL
PAINLEVEL_OUTOF10: 0
PAINLEVEL_OUTOF10: 7
PAINLEVEL_OUTOF10: 5
PAINLEVEL_OUTOF10: 0
PAINLEVEL_OUTOF10: 0
PAINLEVEL_OUTOF10: 7

## 2023-01-23 ASSESSMENT — PAIN DESCRIPTION - ONSET: ONSET: ON-GOING

## 2023-01-23 ASSESSMENT — PAIN DESCRIPTION - ORIENTATION
ORIENTATION: LEFT

## 2023-01-23 ASSESSMENT — PAIN DESCRIPTION - LOCATION
LOCATION: BACK
LOCATION: HIP
LOCATION: ABDOMEN

## 2023-01-23 ASSESSMENT — PAIN SCALES - WONG BAKER
WONGBAKER_NUMERICALRESPONSE: 0
WONGBAKER_NUMERICALRESPONSE: 0

## 2023-01-23 ASSESSMENT — PAIN DESCRIPTION - DESCRIPTORS
DESCRIPTORS: ACHING

## 2023-01-23 ASSESSMENT — PAIN DESCRIPTION - FREQUENCY: FREQUENCY: INTERMITTENT

## 2023-01-23 ASSESSMENT — PAIN - FUNCTIONAL ASSESSMENT: PAIN_FUNCTIONAL_ASSESSMENT: PREVENTS OR INTERFERES SOME ACTIVE ACTIVITIES AND ADLS
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
## 2023-01-23 NOTE — PROGRESS NOTES
Occupational Therapy  . Occupational Therapy Treatment Note    Name: Neal Briceno MRN: [id number] :   1950   Date:  2023   Admission Date: 2023 Room:  [facility]     Primary Problem:   The primary encounter diagnosis was Closed nondisplaced fracture of phalanx of great toe, unspecified laterality, unspecified phalanx, initial encounter. Diagnoses of Fall, initial encounter, Musculoskeletal pain, Inclusion body myositis, Pleural effusion, and Anemia, unspecified type were also pertinent to this visit. Restrictions/Precautions:  Restrictions/Precautions  Restrictions/Precautions: General Precautions, Fall Risk, Weight Bearing Position Activity Restriction  Other position/activity restrictions: can either wear a postop shoe to both feet or a regular shoe as tolerated per Dr. Halle Alegria Weight Bearing Restrictions  Right Lower Extremity Weight Bearing: Weight Bearing As Tolerated  Left Lower Extremity Weight Bearing: Weight Bearing As Tolerated       Communication with other providers: Per chart review, patient is appropriate for therapeutic intervention. Nurse Ashley Olivares notified of pt's pain in [address]. Subjective:  Patient states:  \"I have a muscle disease that makes me weak. I mostly get tired. \" Pt reports need to use the toilet and is anticipating discharge to ARU soon. Pain:   Location, Type, Intensity (0/10 to 10/10):  4/10, left ribs at rest, \"goes higher when I move\". Objective:    Observation: Pt received seated in bedside chair, A&O to self / situation. Objective Measures:  Telemetry    Treatment, including education:  Self Care Training:   Cues were given for safety, sequence, UE/LE placement, visual cues, and balance. Activities performed today included UB/LB dressing tasks, toileting, hand hygiene at sink  CGA c RW for functional mobility to / from bathroom.   Sit to stand: CGA c RW  Stand to sit: Min A for controlled descent into chair + cues for safe body positioning and hand placement. Toilet Transfer: CGA c RW for use of elevated BSC over toilet + mod cues for safe body positioning throughout. CGA during hand hygiene in stance at sink + cues for safe body positioning c RW to avoid over-reach / unsteadiness. All therapeutic intervention performed c emphasis on toileting ADL, dynamic balance / standing tolerance to inc strength, endurance and act tolerance for inc Indep c ADL tasks, func transfers / mobility. Safety  Patient safely in bedside chair as found on approach at end of session, with call light/phone in reach, and nursing notified. Gait belt was used for func transfers / mobility. Spouse present. Assessment / Impression:    Patient's tolerance of treatment: Well  Adverse Reaction: None  Significant change in status and impact: Improved from initial evaluation  Barriers to improvement: Balance       Plan for Next Session:    Continue per OT POC per patient's tolerance c emphasis on safe body positioning during functional transfers / mobility during ADL Tasks. Time in:  1610  Time out:  1624  Timed treatment minutes:  14  Total treatment time:  14      Electronically signed by:    FREDRICK Langley  1/23/2023, 4:25 PM    Goals:  Pt will complete all aspects of bed mobility for EOB/OOB ADLs w/ mod I. Pt will complete UB ADLs w/ mod I. Pt will complete LB ADLs w/ mod I. Pt will complete all functional transfers to and from bed, chair, toilet, shower chair w/ mod I. Pt will ambulate functional household distance w/ mod I. Pt will complete all aspects of toileting task w/ mod I. Pt will perform therex/theract in order to increase strength and functional activity tolerance necessary for increased independence w/ ADL routine.

## 2023-01-23 NOTE — CARE COORDINATION
Received referral for ARU. Met with patient  and discussed ARU. Explained to patient the required 3 hours of therapy a day. Also explained the average length of stay is 11 days, could be longer or shorter depending on recommendations of therapy and Dr. Shahana Granado. Patient expresses her understanding and states she's agreeable to admit to ARU. Per patient her goal is to return home with her  at discharge from ARU. Will review clinicals and therapy notes, then present to Dr. Shahana Granado. Will notify CM determination. 1220:  Patient meets criteria and is approved to come to ARU. Patient able to admit once medically stable and after ARU Medical Director and  sign the pre-admission screen (PAS), pending rapid COVID results. Notified MD and CM of approval.  Requested for rapid COVID, d/c, and readmit orders be placed.

## 2023-01-23 NOTE — PROGRESS NOTES
01/23/23 0729   Encounter Summary   Encounter Overview/Reason  Spiritual/Emotional Needs   Service Provided For: Patient   Referral/Consult From: Other    Support System Spouse   Last Encounter  01/22/23  (Vol. visit by Toney Gilman; documented by Armando Quesada)   Complexity of Encounter Low   Begin Time 1145   End Time  1150   Total Time Calculated 5 min   Encounter    Type Follow up   Spiritual/Emotional needs   Type Spiritual Support   Rituals, Rites and Sacraments   Type Yarsani Communion  (Rosary given)   Assessment/Intervention/Outcome   Assessment Calm;Coping   Intervention Active listening;Empowerment   Outcome Comfort;Coping;Encouraged   Plan and Referrals   Plan/Referrals Continue to visit, (comment)

## 2023-01-23 NOTE — PROGRESS NOTES
Report from Philip BOWEN. He is going to check and chart another set of vitals since none have been taken since this AM. Advised we are ready for the patient at this time in room 1007.

## 2023-01-23 NOTE — PROGRESS NOTES
01/23/23 8532   Encounter Summary   Encounter Overview/Reason  Initial Encounter   Service Provided For: Patient   Referral/Consult From: Other    Support System Spouse   Last Encounter  01/21/23  (Vol. visit by Sami Dietz; documented by Kelle Jacques)   Complexity of Encounter Low   Begin Time 1130   End Time  1135   Total Time Calculated 5 min   Encounter    Type Initial Screen/Assessment   Spiritual/Emotional needs   Type Spiritual Support   Rituals, Rites and Sacraments   Type Buddhism Communion  (Eliseo Catarina left for patient)   Assessment/Intervention/Outcome   Assessment Calm   Intervention Active listening   Outcome Comfort   Plan and Referrals   Plan/Referrals Continue to visit, (comment)

## 2023-01-23 NOTE — PROGRESS NOTES
Physical Therapy    Physical Therapy Treatment Note  Name: Ford Case MRN: [id number] :   1950   Date:  2023   Admission Date: 2023 Room:  [address] [place] [address]   Restrictions/Precautions:  Restrictions/Precautions  Restrictions/Precautions: General Precautions, Fall Risk, Weight Bearing Position Activity Restriction  Other position/activity restrictions: can either wear a postop shoe to both feet or a regular shoe as tolerated per Dr. Devona Holter Extremity Weight Bearing Restrictions  Right Lower Extremity Weight Bearing: Weight Bearing As Tolerated  Left Lower Extremity Weight Bearing: Weight Bearing As Tolerated     Subjective:  Patient states: \"It feels good to walk\" . Pt notified therapist that  is working on putting a stair lift in to access her bedroom. Pain:   Location, Type, Intensity (0/10 to 10/10):  L sided ribs and bilat foot pain, did not quantify. Tolerable at this time    Objective:    Observation:    Supine in bed. Cooperative with therapy   Objective Measures:   Stable vitals on room air   Treatment, including education/measures:  Supine to sit: SBA for safety with inc time due to pain in ribs   Sit to stand: SBA for safety from EOB with VCS for hand sequencing from seat surface to RW   Stand to sit: CGA for safety to recliner. VCS for hand sequencing back to chair for support   Step pivot: CGA for safety with RW   Ambulation: ~100ft + 200ft with RW CGA for safety with brief standing rest break taken between trials. Dec olga with reciprocal gait pattern. No major LOB noted. Good awareness of her limits   Sitting balance: SBA for safety with single to BUE support at EOB. Pt needed assist donning socks and post op shoes. Standing balance: CGA to SBA static stance at [email or web address] pt on POC, role of PT, DME use, discharge recommendations, home set up.  Cues provided for sequencing to inc safety and indep with mobility   Assessment / Impression:    Patient's tolerance of treatment:  good    Adverse Reaction: no  Significant change in status and impact:  inc ambulation tolerance   Barriers to improvement:  activity tolerance, strength, balance, pain, medical hx   Plan for Next Session:    Activity tolerance, strength, balance, gait, transfers, stairs, bed mobility   Time in:  0945  Time out:  1008  Timed treatment minutes:  23  Total treatment time:  23 minutes     Previously filed items:  Social/Functional History  Lives With: Spouse  Type of Home: House  Home Layout: Multi-level (7 steps upstairs - reports eventually installing chair lift)  Home Access: Stairs to enter without rails  Entrance Stairs - Number of Steps: 2  Bathroom Shower/Tub: Tub/Shower unit  Bathroom Toilet: Standard  Bathroom Equipment: Shower chair, Grab bars in shower, Toilet raiser  Home Equipment: Cane, Rollator, Reacher, Walker, rolling  Has the patient had two or more falls in the past year or any fall with injury in the past year?: Yes (reports ~10, \"no warning I just trip and go down\")  ADL Assistance: [address] [place] [address]:  (spouse completes mostly, pt occasionally prepares meals)  Ambulation Assistance: Independent (w/o AD in home, occasionally uses RW. Uses RW w/ community mobility)  Transfer Assistance: Independent  Active : No     Long Term Goals  Time Frame for Long Term Goals :  In one week:  Long Term Goal 1: Pt will complete all bed mobility with supervision  Long Term Goal 2: Pt will complete sit <> stand transfers with SBAx1  Long Term Goal 3: Pt will ambulate 75 feet with SBAx1 with LRAD  Long Term Goal 4: Pt will ascend/descend 6 steps with a handrail with minAx1  Long Term Goal 5: Pt will independently complete 3 sets of 10 reps of BLE AROM exercises in available and allowed ROM       Electronically signed by:    Sandee Jones [id number]  1/23/2023, 12:04 PM

## 2023-01-23 NOTE — CARE COORDINATION
CM in to see Pt to follow up on discharge planning. Pt agreeable to therapy recommendation of ARU. Referral made to [facility]/ARU    Pt denies any needs at this time.      CM following

## 2023-01-24 PROCEDURE — 99232 SBSQ HOSP IP/OBS MODERATE 35: CPT | Performed by: PHYSICAL MEDICINE & REHABILITATION

## 2023-01-24 PROCEDURE — 97163 PT EVAL HIGH COMPLEX 45 MIN: CPT

## 2023-01-24 PROCEDURE — 92523 SPEECH SOUND LANG COMPREHEN: CPT

## 2023-01-24 PROCEDURE — 97530 THERAPEUTIC ACTIVITIES: CPT

## 2023-01-24 PROCEDURE — 97535 SELF CARE MNGMENT TRAINING: CPT

## 2023-01-24 PROCEDURE — 2700000000 HC OXYGEN THERAPY PER DAY

## 2023-01-24 PROCEDURE — 97116 GAIT TRAINING THERAPY: CPT

## 2023-01-24 PROCEDURE — 1280000000 HC REHAB R&B

## 2023-01-24 PROCEDURE — 6370000000 HC RX 637 (ALT 250 FOR IP): Performed by: STUDENT IN AN ORGANIZED HEALTH CARE EDUCATION/TRAINING PROGRAM

## 2023-01-24 PROCEDURE — 94150 VITAL CAPACITY TEST: CPT

## 2023-01-24 PROCEDURE — 94664 DEMO&/EVAL PT USE INHALER: CPT

## 2023-01-24 PROCEDURE — 97166 OT EVAL MOD COMPLEX 45 MIN: CPT

## 2023-01-24 PROCEDURE — 6370000000 HC RX 637 (ALT 250 FOR IP): Performed by: PHYSICAL MEDICINE & REHABILITATION

## 2023-01-24 PROCEDURE — 6360000002 HC RX W HCPCS: Performed by: STUDENT IN AN ORGANIZED HEALTH CARE EDUCATION/TRAINING PROGRAM

## 2023-01-24 PROCEDURE — 94761 N-INVAS EAR/PLS OXIMETRY MLT: CPT

## 2023-01-24 RX ADMIN — AMLODIPINE BESYLATE 5 MG: 5 TABLET ORAL at 09:40

## 2023-01-24 RX ADMIN — ACETAMINOPHEN 650 MG: 325 TABLET ORAL at 05:40

## 2023-01-24 RX ADMIN — ENOXAPARIN SODIUM 40 MG: 100 INJECTION SUBCUTANEOUS at 19:51

## 2023-01-24 RX ADMIN — OXYCODONE HYDROCHLORIDE 5 MG: 5 TABLET ORAL at 14:38

## 2023-01-24 RX ADMIN — DOCUSATE SODIUM 100 MG: 100 CAPSULE, LIQUID FILLED ORAL at 09:40

## 2023-01-24 RX ADMIN — TIZANIDINE 4 MG: 4 TABLET ORAL at 19:51

## 2023-01-24 ASSESSMENT — PAIN SCALES - GENERAL
PAINLEVEL_OUTOF10: 0
PAINLEVEL_OUTOF10: 3
PAINLEVEL_OUTOF10: 0
PAINLEVEL_OUTOF10: 7
PAINLEVEL_OUTOF10: 9
PAINLEVEL_OUTOF10: 0

## 2023-01-24 ASSESSMENT — PAIN DESCRIPTION - LOCATION
LOCATION: RIB CAGE
LOCATION: RIB CAGE

## 2023-01-24 ASSESSMENT — PAIN SCALES - WONG BAKER
WONGBAKER_NUMERICALRESPONSE: 0

## 2023-01-24 ASSESSMENT — PAIN DESCRIPTION - FREQUENCY: FREQUENCY: OTHER (COMMENT)

## 2023-01-24 ASSESSMENT — PAIN DESCRIPTION - ORIENTATION
ORIENTATION: LEFT
ORIENTATION: LEFT

## 2023-01-24 ASSESSMENT — PAIN DESCRIPTION - PAIN TYPE: TYPE: OTHER (COMMENT)

## 2023-01-24 ASSESSMENT — PAIN - FUNCTIONAL ASSESSMENT
PAIN_FUNCTIONAL_ASSESSMENT: ACTIVITIES ARE NOT PREVENTED
PAIN_FUNCTIONAL_ASSESSMENT: PREVENTS OR INTERFERES SOME ACTIVE ACTIVITIES AND ADLS

## 2023-01-24 ASSESSMENT — PAIN DESCRIPTION - DESCRIPTORS
DESCRIPTORS: SPASM
DESCRIPTORS: DULL

## 2023-01-24 ASSESSMENT — PAIN DESCRIPTION - ONSET: ONSET: OTHER (COMMENT)

## 2023-01-24 NOTE — PROGRESS NOTES
4 Eyes Skin Assessment     NAME:  Tamara García  YOB: 1950  MEDICAL RECORD NUMBER:  [id number]    The patient is being assess for  Admission    I agree that 2 RN's have performed a thorough Head to Toe Skin Assessment on the patient. ALL assessment sites listed below have been assessed. Areas assessed by both nurses:    Head, Face, Ears, Shoulders, Back, Chest, Arms, Elbows, Hands, Sacrum. Buttock, Coccyx, Ischium, and Legs. Feet and Heels        Does the Patient have a Wound? No noted wound(s) but bilateral lower extremities/ankles are swollen and bruised. No open sores a skin tear on left hand which was caused by patient accidentally hit her walker during her fall at home. Femi Prevention initiated:  NA   Wound Care Orders initiated:  NA    Pressure Injury (Stage 3,4, Unstageable, DTI, NWPT, and Complex wounds) if present place consult order under [de-identified] NA    New and Established Ostomies if present place consult order under : NA      Nurse 1 eSignature: Electronically signed by Rafa Constantino RN BSN  on 1/23/23 at 8:16 PM EST    **SHARE this note so that the co-signing nurse is able to place an eSignature**    Nurse 2 eSignature: Electronically signed by Alyx Zavala RN on 1/24/23 at 12:38 AM EST

## 2023-01-24 NOTE — PROGRESS NOTES
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  DATE ONSET: 23    Date of Eval: 2023   Evaluating Therapist: JERAMIE Chacon    Primary Complaint: I've noticed my speech having some changes where it's not always clear. Pain:  Pain Assessment  Pain Assessment: 0-10  Pain Level: 3  Sykes-Baker Pain Rating: No hurt  Patient's Stated Pain Goal: 0 - No pain (pre-med for therapy)  Pain Location: Rib cage  Pain Orientation: Left  Pain Descriptors: Dull  Functional Pain Assessment: Activities are not prevented  Pain Type: Other (Comment) (pre-med for therapy)  Pain Radiating Towards: pre-med for therapy  Pain Frequency: Other (Comment) (pre-med for therapy)  Pain Onset: Other (Comment)  Non-Pharmaceutical Pain Intervention(s): None - no pain observed (pre-med for therapy)  Response to Pain Intervention: Patient satisfied  Side Effects: No reported side effects    Vision/ Hearing  Vision  Vision: Impaired  Vision Exceptions: Cataracts; Wears glasses at all times  Hearing  Hearing: Within functional limits    Assessment: Per PAS: Nieves Clarke is a 67 y.o. female with a PMHx of hypertension, multiple falls, inclusion body myositis, who presented on  with Fall at home. Patient with c/o x2 falls at home. Patient and spouse reports progressive decline in ambulation and multiple falls especially over the past 2 years secondary to progression of inclusion body myositis. Reported involvement of truncal and somewhat pharyngeal muscles as well. Reports that she did not hit her head or her neck, but fell on top over her plantarflexed forefoot.  Patient states that most recently she has fallen twice over the past 5 days. In her previous fall, she said that she hit the left side of her chest on the countertop. Patient does follow-up with a neurologist for inclusion body myositis but has not been able to tolerate prednisone or any other immunosuppressive medications. Patient was found to have left 10th rib fracture and acute BLE phalangeal fractures. LLL atelectasis 2/2 rib fx. Also being medically managed for KERI, hypokalemia, and dizziness. Patient lives with spouse and is IND with ADL's and ambulation in house (RW for community distances). Currently patient is min-modA with ADL's and Penny with RW for transfers and ambulation. COVID negative test noted on 1/23. Medical/Surgical History   Abdominal aortic aneurysm (AAA) >39 mm diameter    Hypertension    IBS (irritable bowel syndrome)      Radiology Findings  Impression 1. Acute fracture in the posterior left 10th rib with mild to moderate displacement and acute nondisplaced fracture of the posterior left 11th rib. The subtle deformities in the left 8th and 9th ribs may be from old injury. 2.  There is no hemothorax or pneumothorax. Pulmonary emphysema is present with areas of atelectasis/scarring in the lower lungs. Opacification of the inferior most left lower lobe could be part of the atelectasis versus is a mild pulmonary contusion. 3.  No evidence of laceration or focal hematoma at the spleen or liver. No free air or free fluid in the upper abdomen. 4.  Small cysts are suggested within the liver and kidneys. MBSS 12/19/22  Luis F Lynn was seen for an outpatient MBSS due to her c/o dysphagia to bread and thin liquids. Relevant h/o Inclusion body myositis, HTN, AAA, IBS and Reflux. Pt specifically c/o globus sensation with PO intake as if food or liquid is \"just sitting\" in her throat. She denies any choking episodes. Pt reports lip weakness and numbness resulting in occasional anterior oral loss of liquids by spoon.   In terms of nutrition, pt reports that she tires after 30 minutes of eating and cannot get through a meal stating \"I don't eat all that much. \"  She reports a 30# weight loss over one year and has weighed approximately 130# for the last six months. The oral [facility] with adequate bilabial closure for spoon, cup and straw presentations. Mildly prolonged mastication with normal clearance for all textures. Moderate pharyngoesophageal dysphagia with virtually absent epiglottic inversion and anterior laryngeal excursion and severely limited upper esophageal sphincter opening. Deficits resulted in trace penetration for thin liquids midway to the vocal folds and to the anterior commissure for thin residue. Also severe residue in the pyriforms for all textures, effortful clearance through the UES with backflow into the pharynx observed. Pt was able to clear small amounts of residue with 3-5 swallows. She demonstrated reduced sensitivity to residue and required cues to complete repeat swallows. Esophageal screen:  sweep not completed, however, noted solids remained in upper 1/3 of the esophagus after the swallow. Recommend:  Regular solids as tolerated/Thins liquids by cup. Aspiration and Reflux precautions. May consider use of alternate route of nutrition to supplement oral intake due to c/o inability to consume full meals due to fatigue resulting from pharyngoesophageal dysphagia, h/o weight loss or monitor nutritional status/weight until needed. May consider use of oral nutritional supplement. Refer to GI for further assessment of esophagus. Past Medical History:  has a past medical history of Abdominal aortic aneurysm (AAA) >39 mm diameter ([address] Hypertension, and IBS (irritable bowel syndrome). Past Surgical History:  has a past surgical history that includes Tonsillectomy       FINDINGS: 1/20/23   Chest x-ray: No pneumothorax or infiltrate is identified.   There is a mild to moderate left pleural effusion. An underlying lung contusion or mass is not   excluded. The heart size is within normal limits. Pelvic x-ray: No acute fracture or hip dislocation is identified. Degenerative changes of the lower lumbar spine are noted. Impression   Left pleural effusion. No fracture visualized. Cognitive Diagnosis: WFL  Speech Diagnosis: Minimal dysarthria with good awareness and modification with fatigue        Recommendations:  Recommendations  Requires SLP Intervention: No  Patient Education: Results and recommendations; rehab expectations  Patient Education Response: Verbalizes understanding  Duration of Treatment: No ST          Plan:   Speech Therapy Prognosis  Prognosis: Good  Prognosis Considerations: Potential;Motivation;Participation Level  Individuals consulted  Consulted and agree with results and recommendations: Patient  Safety Devices  Safety Devices in place: Yes  Type of devices: All fall risk precautions in place    Goals:  Short Term Goals  Time Frame for Short Term Goals: No ST   Patient/family involved in developing goals and treatment plan: Pt in agreement    Subjective:   Previous level of function and limitations: Jorge Bonilla was independent with activities of daily living prior to admit. 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 Pt has cataracts. No pets at home but loves animals. Pt uses pill box for 1 pill at home. Spouse manages finances. Vision  Vision: Impaired  Vision Exceptions: Cataracts; Wears glasses at all times  Hearing  Hearing: Within functional limits           Objective:       Oral Motor   Labial: No impairment  Dentition: Natural  Oral Hygiene: Moist  Lingual: Left deviation  Velum: No Impairment  Mandible: No impairment    Motor Speech  Apraxic Characteristics: None  Dysarthric Characteristics: Imprecise (Pt with L tongue deviation and occasional imprecise pronunciation)  Intonation: WFL  Rate: WFL  Prosody: WFL  Overall Impairment Severity: Minimal  Compensatory Strategies for Motor Speech: Pt feels like she has to slow down for clarity    Auditory Comprehension  Comprehension: Within Functional Limits         Expression  Primary Mode of Expression: Verbal    Verbal Expression  Verbal Expression: Within functional limits    Written Expression  Dominant Hand: Right  Written Expression: Within Functional Limits         Pragmatics/Social Functioning  Pragmatics: Within functional limits    Cognition:      Orientation  Overall Orientation Status: Within Functional Limits  Attention  Attention: Within Functional Limits  Memory  Memory: Within Functional Limits  Problem Solving  Problem Solving: Within Functional Limits  Numeric Reasoning  Numeric Reasoning: Within Functional Limits  Abstract Reasoning  Abstract Reasoning: Within Functional Limits  Safety/Judgment  Safety/Judgment: Within Functional Limits    Additional Assessments:BIMS  Understanding Verbal and Non-Verbal Content: Understands  Expression of Ideas and Wants: Without difficulty  Cognitive Pattern Assessment Used: BIMS  BIMS Summary Score: 15 out of 15 cognitive score  The Brief Cognitive Assessment Tool (BCAT®) was administered 1/24/2023 to assess the following cognitive domains: orientation, verbal recall, visual recognition, visual recall, attention, abstraction, language, executive functions, and visuo-spatial processing. The emphasis of the tool is assessing contextual memory, executive functions, and attentional capacity.    Cognitive Impairment Scale:  NORMAL:    46-50   NO FUNCTIONAL DEFICIT; INDEPENDENT LIVING; MAY BE  SUBJECTIVE MEMORY COMPLAINTS BUT LITTLE TO NO EVIDENCE  MILD COGNITIVE  IMPAIRMENT; 34-46   GENERALLY FUNCTIONAL WITH EARLY SPECIFIC FX DECLINE (IADL)   LOWER SCORES MORE SUGGESTIVE OF ADDITIONAL SUPPORT NEEDS   BOTTOM RANGE SCORES CONSIDER MED MGMT AND SUPPORT FOR COMMUNITY REINTEGRATION    MILD DEMENTIA 26-34   IADL DEFICITS; CLEARLY OBJECTIVE EVIDENCE OF MEMORY AND OTHER COGNITIVE DECLINE; MED MGMT AND COMMUNITY REINTEGRATION SUPPORT NEEDED    MODERATE TO   SEVERE DEMENTIA 0-25   MODERATE (UPPER END OF RANGE)--PERVASIVE FX DEFICITS (IADLS) BUT ADLS GENERALLY INTACT   MARKED DEFICITS IN MEMORY AND EXECUTIVE FX; BEHAVIORAL AND PSYCH SYMPTOMS ARE COMMON      Dementia Impairment Scale:  Mild Cognitive Impairment  38  Mild Dementia  28  Moderate Dementia  18      BCAT score for Raysa Patterson: 50/50 indicating normal  cognitive fx  Strengths:  Ability to put names to objects  Ability to concentrate and focus  Determine how objects are similar to one another  Understand and express speech  Understand visual processes and relationships  \"Command and control\" cognitive activities  Awareness of self, time, place, and situation  Ability to immediately recall a word list of 4 items: banana/justice/Lisa/bridge. Pt able to complete immediate recall 4/4 and delayed recall 4/4  Ability to recall 3 pictures after 10 minute delay 3/3  Recall previously presented words over a time delay and recall elements of a story and previously presented pictures/story  Story specifics included:  Carlton Null / borrowed / $9 / from her brother / Rosa Burroughs / last week. / She couldn't pay him back / because she bought /a delicious / ice cream cone / at the circus instead. Pts immediate response was 11/11 and delayed response: 11/11  Strategies that improved performance included:   [] repetition   [] practical application    [] spaced retrieval    [] choice cues    [] visual cues  The Verbal Test of Practical Judgement (VPJ) was administered. This is a tool for assessing judgment among older adults with suspected cognitive impairment. VPJ score inferences provide good information on the odds of requiring assistance for specific IADLs.   Pt achieved a total score of 20/20 indicating normal fx. Areas addressed include medication issues, falls, healthproblem solving/decision-making, appt conflicts, financial issues, and dealing with strangers. Severity ratings:  WFL: 16-20 while this score points to adequate judgment, it does not mean that the patient will always exercise good judgment and make appropriate decisions. Mild: 15  Moderate: 13-14  Severe: <12                 Prognosis:  Speech Therapy Prognosis  Prognosis: Good  Prognosis Considerations: Potential;Motivation;Participation Level  Individuals consulted  Consulted and agree with results and recommendations: Patient    Education:  Patient Education: Results and recommendations; rehab expectations  Patient Education Response: Verbalizes understanding  Safety Devices in place: Yes  Type of devices:  All fall risk precautions in place    Therapy Time:   Individual Concurrent Group Co-treatment   Time In 1000         Time Out 1030         Minutes 30                 Electronically signed by JERAMIE Estevez on 1/24/2023 at 6:03 PM

## 2023-01-24 NOTE — PROGRESS NOTES
ARU Admission Assessment      A Complete drug regimen review was completed for this patient this date. [x]  No clinically significant medication issue was identified   []  Yes, a clinically significant medication issue was identified     []  Adverse Drug Event:    []  Allergy:    []  Side Effect:    []  Ineffective Therapy:    []  Drug interaction:     []  Duplicate Therapy:    []  Untreated Indication:    []  Non-adherence:    []  Other:  Nursing contacted the physician:       Date:                Time:    Actions recommended by physician were completed:   Date:                 Time:  Action(s) Taken:             []  New Physician Order Received    []  Issue Noted by Physician; However No Action Required    []  Other:        Ethnicity  \"Are you of , /a, or Australian origin? \"  Check all that apply:  [x] A. No, not of , /a, or [place] Origin  [] B.  Yes, [place], [place] American, Chicano/a  [] C.  Yes, [address] [place] [address]  [] D.  Yes, [place]  [] E.  Yes, another , , or Australian origin  [] X. Patient unable to respond    Race  \"What is your race? \"  Check all that apply:  [x] A. White  [] B. Black or   [] C. American [place] [facility] or [place] Native  [] D.  [place] [facility]  [] E. [place]  [] F. Rwandan  [] G. [place]  [] Willaim Cockayne  [] I. [place]  [] J.  Other   [] K.   [] L. Tanzanian or [place]  [] M. Norwegian  [] N. Other [facility]  [] X. Patient unable to respond    Language  A. \"What is your preferred language? \"   English    B. \"Do you need or want an  to communicate with a doctor or health care staff? \"  Check only one:  [x] 0. No  [] 1. Yes  [] 9. Unable to determine    Transportation  \"In the past 6-12 months, has lack of transportation kept you from medical appointments, meetings, work, or from getting things needed for daily living? \"  Check all that apply:  [] A.  Yes, it has kept me from medical appointments or from getting my medications  [] B.  Yes, it has kept me from non-medical meetings, appointments, work, or from getting things that I need  [x] C.  No  [] X. Patient unable to respond  [] Y. Patient declines to respond    Hearing  Ability to hear (with hearing aid or hearing appliances if normally used)  [x]  0. Adequate - no difficulty in normal conversation, social interaction, listening to TV  []  1. Minimal difficulty - difficulty in some environments (e.g. when person speaks softly or setting is noisy)  []  2. Moderate difficulty - speaker has to increase volume and speak distinctly   []  3. Highly impaired - absence of useful hearing    Vision  Ability to see in adequate light (with glasses or other visual appliances)  [x]  0. Adequate - sees fine detail, such as regular print in newspapers/books  []  1. Impaired - sees large print, but not regular print in newspapers/books  []  2. Moderately impaired - limited vision; not able to see newspaper headlines but can identify objects  []  3. Highly impaired - object identification in question, but eyes appear to follow objects  []  4. Severely impaired - no vision or sees only light, colors, or shapes; eyes do not appear to follow objects    Health Literacy  \"How often do you need to have someone help you when you read instructions, pamphlets, or other written material from your doctor or pharmacy? \"  [x]  0. Never  []  1. Rarely  []  2. Sometimes  []  3. Often  []  4. Always  []  8. Patient unable to respond    Signs and Symptoms of Delirium  A. Acute Onset Mental Status Change - Is there evidence of an acute change in mental status from the patient's baseline?  [] 0. No  [] 1. Yes    B. Inattention - Did the patient have difficulty focusing attention, for example being easily distractible or having difficulty keeping track of what was being said? [x]  0. Behavior not present  []  1. Behavior continuously present, does not fluctuate  []  2. Behavior present, fluctuates (comes and goes, changes in severity)    C. Disorganized thinking - Was the patient's thinking disorganized or incoherent (rambling or irrelevant conversation, unclear or illogical flow of ideas, or unpredictable switching from subject to subject)? [x]  0. Behavior not present  []  1. Behavior continuously present, does not fluctuate  []  2. Behavior present, fluctuates (comes and goes, changes in severity)    D. Altered level of consciousness - Did the patient have altered level of consciousness as indicated by any of the following criteria? Vigilant - startled easily to any sound or touch  Lethargic - repeatedly dozed off while being asked questions, but responded to voice or touch  Stuporous - very difficulty to arouse and keep aroused for the interview  Comatose - could not be aroused  [x]  0. Behavior not present  []  1. Behavior continuously present, does not fluctuate  []  2. Behavior present, fluctuates (comes and goes, changes in severity)    Mood    \"Over the last 2 weeks, have you been bothered by any of the following problems?\" 1. Symptom Presence    0 = No  1 = Yes  9 = No Response 2. Symptom Frequency    0 = Never or 1 day  1 = 2-6 days (several days)  2 = 7-11 days (half or more of the days)  3 = 12-14 days (nearly every day)  **Leave blank if 'No Reponse'**      Enter scores in boxes    Column 1 Column 2   Little interest or pleasure in doing things   [x] 0. No  [] 1. Yes  [] 9. No Response [x] 0. Never or one day  [] 1.  2-6 days (several days)  [] 2.  7-11 days (half or more of days)  [] 3.  12-14 days (nearly every day)     Feeling down, depressed, or hopeless   [x] 0. No  [] 1. Yes  [] 9. No Response [x] 0. Never or one day  [] 1.  2-6 days (several days)  [] 2.  7-11 days (half or more of days)  [] 3.  12-14 days (nearly every day)   **If Question A or B in column 2 is coded 2 or 3, CONTINUE asking the questions below in box.   If not, SKIP down to \"social isolation\" question and continue**     Trouble falling or staying asleep, or sleeping too much   [] 0. No  [] 1. Yes  [] 9. No Response [] 0. Never or one day  [] 1.  2-6 days (several days)  [] 2.  7-11 days (half or more of days)  [] 3.  12-14 days (nearly every day   Feeling tired or having little energy   [] 0. No  [] 1. Yes  [] 9. No Response [] 0. Never or one day  [] 1.  2-6 days (several days)  [] 2.  7-11 days (half or more of days)  [] 3.  12-14 days (nearly every day   Poor appetite or overeating     [] 0. No  [] 1. Yes  [] 9. No Response [] 0. Never or one day  [] 1.  2-6 days (several days)  [] 2.  7-11 days (half or more of days)  [] 3.  12-14 days (nearly every day   Feeling bad about yourself - or that you are a failure or have let yourself or your family down   [] 0. No  [] 1. Yes  [] 9. No Response [] 0. Never or one day  [] 1.  2-6 days (several days)  [] 2.  7-11 days (half or more of days)  [] 3.  12-14 days (nearly every day   Trouble concentrating on things, such as reading the newspaper or watching television   [] 0. No  [] 1. Yes  [] 9. No Response [] 0. Never or one day  [] 1.  2-6 days (several days)  [] 2.  7-11 days (half or more of days)  [] 3.  12-14 days (nearly every day   Moving or speaking so slowly that other people could have noticed. Or the opposite- being so fidgety or restless that you have been moving around a lot more than usual.   [] 0. No  [] 1. Yes  [] 9. No Response [] 0. Never or one day  [] 1.  2-6 days (several days)  [] 2.  7-11 days (half or more of days)  [] 3.  12-14 days (nearly every day   Thoughts that you would be better off dead, or of hurting yourself in some way.   [] 0. No  [] 1. Yes  [] 9. No Response [] 0. Never or one day  [] 1.  2-6 days (several days)  [] 2.  7-11 days (half or more of days)  [] 3.  12-14 days (nearly every day     Social Isolation  \"How often do you feel lonely or isolated from those around you? \"  [x] 0. Never  [] 1. Rarely  [] 2. Sometimes  [] 3. Often  [] 4. Always  [] 8. Patient unable to respond    Pain Effect on Sleep  \"Over the past 5 days, how much of the time has pain made it hard for you to sleep at night? \"  []  0. Does not apply - I have not had any pain or hurting in the past 5 days  []  1. Rarely or not at all  []  2. Occasionally  [x]  3. Frequently  []  4. Almost constantly  []  8. Unable to answer  **If the patient answers \"0. Does not apply\" to this question, skip the next two \"Pain\" questions**      Pain Interference with Therapy Activities  \"Over the past 5 days, how often have you limited your participation in rehabilitation therapy sessions due to pain? \"  []  0. Does not apply - I have not received rehabilitation therapy in the past 5 days  []  1. Rarely or not at all  [x]  2. Occasionally  []  3. Frequently  []  4. Almost constantly  []  8. Unable to answer    Pain Interference with Day-to-Day Activities: \"Over the past 5 days, how often have you limited your day-to-day activities (excluding rehabilitation therapy session)? \"  []  1. Rarely or not at all  [x]  2. Occasionally  []  3. Frequently  []  4. Almost constantly  []  8.   Unable to answer

## 2023-01-24 NOTE — PROGRESS NOTES
Comprehensive Nutrition Assessment    Type and Reason for Visit:  Initial, Consult (oral nutrition supplement)    Nutrition Recommendations/Plan:   Continue soft and bite sized diet as needed   Offer oral nutrition supplement during stay as patient will accept   Will continue to follow up during stay      Malnutrition Assessment:  Malnutrition Status:  No malnutrition (01/24/23 1509)    Context:  Chronic Illness       Nutrition Assessment:    Admit to acute rehab with hx falls, pain with inclusion body myositis, recent fractures. Currently on soft and bite sized diet. Meal intake %. Patient usually eats 2 meals per day at home, does not eat breakfast. Discussed meal schedule in rehab and encourged to consume 3 meals each day. No significant weight change in past year. Will follow at low nutrition risk at this time time. Nutrition Related Findings:    sitting up in chair eating lunch,   fall with rib and foot fractures Wound Type: None       Current Nutrition Intake & Therapies:    Average Meal Intake: %  Average Supplements Intake: None Ordered  ADULT DIET; Dysphagia - Soft and Bite Sized    Anthropometric Measures:  Height: 5' 4\" (162.6 cm)  Ideal Body Weight (IBW): 120 lbs (55 kg)       Current Body Weight: 134 lb 0.6 oz (60.8 kg), 111.7 % IBW.  Weight Source: Bed Scale  Current BMI (kg/m2): 23  Usual Body Weight: 136 lb (61.7 kg) (last February 2022)  % Weight Change (Calculated): -1.4  Weight Adjustment For: No Adjustment                 BMI Categories: Normal Weight (BMI 22.0 to 24.9) age over 72    Estimated Daily Nutrient Needs:  Energy Requirements Based On: Kcal/kg  Weight Used for Energy Requirements: Current  Energy (kcal/day): 9054-0739 (25-30 aliza/kg)  Weight Used for Protein Requirements: Current  Protein (g/day): 61-73 (1-1.2 g/kg)  Method Used for Fluid Requirements: 1 ml/kcal  Fluid (ml/day): 1800    Nutrition Diagnosis:   No nutrition diagnosis at this time related to   as evidenced by Nutrition Interventions:   Food and/or Nutrient Delivery: Continue Current Diet, Start Oral Nutrition Supplement  Nutrition Education/Counseling: Education initiated  Coordination of Nutrition Care: Continue to monitor while inpatient       Goals:     Goals: PO intake 75% or greater, by next RD assessment       Nutrition Monitoring and Evaluation:   Behavioral-Environmental Outcomes: None Identified  Food/Nutrient Intake Outcomes: Food and Nutrient Intake  Physical Signs/Symptoms Outcomes: Biochemical Data, Meal Time Behavior, Skin, Weight    Discharge Planning:    Continue current diet     Priyaa Osgood, RD, LD  Contact: [phone]

## 2023-01-24 NOTE — PROGRESS NOTES
Tyrone Quevedo    : 1950  Acct #: [de-identified]  MRN: [id number]              History and physical      Admitting diagnosis: Inclusion body myositis ( [place])    Comorbid diagnoses impacting rehabilitation: Generalized weakness, gait disturbance, dysphagia, acute kidney injury, hypokalemia, left 10th rib fracture, essential hypertension, IBS, fractured toes of both feet, pancytopenia    Chief complaint: Left chest wall pain, general fatigue and foot pain. History of present illness: The patient is a 51-year-old right-hand-dominant female who has been struggling with progressive for extremity weakness over the last several years. She was diagnosed with inclusion body myositis about 2 years ago. She follows with neurology out of town. On 2023 she was brought to the hospital after suffering a series of falls at home. She states her legs just give out where she trips because of her right foot drop. She does not believe she passed out, had palpitations or loss of consciousness after the fall. She does not feel like she injured her head or neck. In our ED she had acute kidney injury with hypokalemia, left rib fracture and multiple phalangeal fractures in the feet. She is also struggled with pancytopenia which is a chronic accompaniment of her myositis. At this point she is too weak to do her own toileting, transfers and self-care and cannot return directly home. She requires inpatient rehabilitation to address these issues. Review of systems: Poor appetite, rib and toe pain. Some positional dizziness. Chronic constipation. The remainder of their review of systems was negative except as mentioned in the history of present illness.     Social History: Lives With: Spouse Eder Lynn- in good health and can help PRN)  Type of Home: House  Home Layout: Multi-level (Tri-level 7 steps upstairs - however currently installing chair lift)  Home Access: Stairs to enter without rails  Entrance Stairs - Number of Steps: 2 ANNIE - Does have handle attached to the door of the home that she uses  Bathroom Shower/Tub: Tub/Shower unit  Bathroom Toilet: Standard  Bathroom Equipment: Shower chair, Grab bars in shower, 3-in-1 commode  Bathroom Accessibility: Walker accessible  Home Equipment: Cane, Rollator, Reacher, Walker, rolling  Has the patient had two or more falls in the past year or any fall with injury in the past year?: Yes (> 10 falls in the last year)  Receives Help From: Family  ADL Assistance: Independent  Homemaking Responsibilities: Yes  Meal Prep Responsibility: Secondary (Pt cooks 1-2 times a week.)  Laundry Responsibility: No ( completes.)  Cleaning Responsibility: No ( completes.)  Bill Paying/Finance Responsibility: No ( completes.)  Shopping Responsibility: Secondary (Pt states \"I go with him ~ 30% of the time\".  completes mostly.)  Dependent Care Responsibility: No  Health Care Management: Primary  Ambulation Assistance: Independent (Pt uses 2WW at night to go in and out of the bathroom, sometimes in the kitchen and states she uses the walker to help her get out of chairs. Pt uses rollator in the community.)  Transfer Assistance: Needs assistance (Pt requires assist with shower transfers and sometimes transfers out of a chair.)  Active : No  Mode of Transportation: [place], Family  Education: HS education  Occupation: Retired  Type of Occupation: [employer]-  Leisure & Hobbies: Pt enjoys paint by numbers, cooking and reading. Pt has cataracts. No pets at home but loves animals. Pt uses pill box for 1 pill at home. Spouse manages finances. Additional Comments: Pt sleeps in regular flat bed. She reports that she has quit smoking. She has never used smokeless tobacco. She reports current alcohol use. She reports that she does not use drugs. Prior (baseline) level of function: Modified independent with mobility and self-care.     Current level of function: Moderate physical assistance needed for mobility and self-care. Allergies:  Patient has no known allergies.     Past Medical History:   Past Medical History:   Diagnosis Date    Abdominal aortic aneurysm (AAA) >39 mm diameter     Hypertension     IBS (irritable bowel syndrome)         Past Surgical History:     Past Surgical History:   Procedure Laterality Date    TONSILLECTOMY         Current Medications:     Current Facility-Administered Medications:     0.9 % sodium chloride infusion, 500 mL, IntraVENous, PRN, Naldo Coronel MD    acetaminophen (TYLENOL) tablet 650 mg, 650 mg, Oral, Q6H PRN **OR** [DISCONTINUED] acetaminophen (TYLENOL) suppository 650 mg, 650 mg, Rectal, Q6H PRN, Naldo Coronel MD    Kimberly Mask ON 1/24/2023] amLODIPine (NORVASC) tablet 5 mg, 5 mg, Oral, Daily, Naldo Coronel MD    enoxaparin (LOVENOX) injection 40 mg, 40 mg, SubCUTAneous, Nightly, Naldo Coronel MD, 40 mg at 01/23/23 1956    [START ON 1/24/2023] lidocaine 4 % external patch 1 patch, 1 patch, TransDERmal, Daily, Naldo Coronel MD    melatonin tablet 3 mg, 3 mg, Oral, Nightly PRN, Naldo Coronel MD, 3 mg at 01/23/23 2240    ondansetron (ZOFRAN-ODT) disintegrating tablet 4 mg, 4 mg, Oral, Q8H PRN **OR** ondansetron (ZOFRAN) injection 4 mg, 4 mg, IntraVENous, Q6H PRN, aNldo Coronel MD    oxyCODONE (ROXICODONE) immediate release tablet 5 mg, 5 mg, Oral, Q4H PRN **OR** oxyCODONE HCl (OXY-IR) immediate release tablet 10 mg, 10 mg, Oral, Q4H PRN, Naldo Coronel MD, 10 mg at 01/23/23 2240    polyethylene glycol (GLYCOLAX) packet 17 g, 17 g, Oral, Daily PRN, Naldo Coronel MD    sodium chloride flush 0.9 % injection 5-40 mL, 5-40 mL, IntraVENous, 2 times per day, Naldo Coronel MD, 10 mL at 01/23/23 2013    sodium chloride flush 0.9 % injection 5-40 mL, 5-40 mL, IntraVENous, PRN, Naldo Coronel MD    tiZANidine (ZANAFLEX) tablet 4 mg, 4 mg, Oral, Q6H PRN, Naldo Coronel MD    bisacodyl (DULCOLAX) suppository 10 mg, 10 mg, Rectal, Daily PRN, NIC Carlisle MD    [START ON 1/24/2023] docusate sodium (COLACE) capsule 100 mg, 100 mg, Oral, Daily, NIC Klein MD    senna (SENOKOT) tablet 8.6 mg, 1 tablet, Oral, Nightly, NIC Klein MD    Family History:   Family History   Problem Relation Age of Onset    Hypertension Mother        Exam:    Blood pressure (!) 159/74, pulse 84, temperature 98.6 °F (37 °C), resp. rate 20, height 5' 4\" (1.626 m), weight 135 lb (61.2 kg), SpO2 97 %. General: Patient was seen sitting up in bed. She guards deep respirations due to rib pain. Alert. Soft-spoken. HEENT: Gazing right and left. Soft-spoken. MMM. No JVD or adenopathy. Strong cervical flexion. Pulmonary: Shallow respirations with bibasilar rhonchi. No coughing or wheezing. Cardiac: Regular rate and rhythm. No murmur. Abdomen: Patient's abdomen was soft and nondistended. Bowel sounds were present throughout. There was no rebound, guarding or masses noted. Spinal exam: Diffuse percussion tenderness without skin breakdown. No gross malalignment. Upper extremities: Patient had weakness of  and digit abduction, more so on the left (3+/5). Poor coordination. 4+/5 strength across her shoulders and elbows. Lower extremities: 3/5 strength in the right ankle and 3 -/5 strength across left ankle. Multiple tender swollen toes where her fractures are. Calves are soft. No signs of DVT. 4/5 strength across the hips and knees. Sitting balance was fair. Standing balance was poor.     Lab Results   Component Value Date    WBC 4.6 01/21/2023    HGB 11.4 (L) 01/21/2023    HCT 34.7 (L) 01/21/2023    MCV 94.8 01/21/2023     01/21/2023     No results found for: INR, PROTIME  Lab Results   Component Value Date    CREATININE 1.1 01/21/2023    BUN 37 (H) 01/21/2023     01/21/2023    K 3.5 01/21/2023    CL 98 (L) 01/21/2023    CO2 29 01/21/2023     Lab Results   Component Value Date    ALT 20 01/21/2023    AST 23 01/21/2023    ALKPHOS 67 01/21/2023    BILITOT 0.7 01/21/2023         Impression: 61-year-old female with inclusion body myositis, for extremity weakness, acute kidney injury and a fall with recent rib fractures and toe fractures. She has dysphagia and pancytopenia. Strengths for the patient: Alertness, family support and some experience with adaptive equipment. Limitations/barriers for the patient: Her age, her progressive weakness from her diagnosis and her risk for aspiration. Recommendation: Acute inpatient rehabilitation with occupational and physical therapy 180 minutes 5 out of every 7 days. Will address basic and  advancing mobility with self-care instruction and adaptive equipment training. Caregiver education will be offered. Expected length of stay  prior to a supervised level of function for discharge home with a walker and [facility] OT/PT is 2 weeks. Additional recommendation:    Inclusion body myositis: The patient requires daily occupational and physical therapy with speech-language pathology. We must provide cautious pain management with aggressive pulmonary hygiene measures and nutritional support. She needs adaptive equipment training, caregiver education and bowel and bladder monitoring with probable retraining. She needs DVT prophylaxis. Outpatient follow-up with her neurologist and PCP following rehab. DVT prophylaxis: Lovenox 40 mg subcu daily is being used cautiously concerning her pancytopenia. Serial blood counts and monitoring closely for signs of bleeding complications. Weightbearing activities will be pursued daily. GI prophylaxis will be added. Uncontrolled pain: The patient requires acetaminophen and oxycodone for her pain related to her rib fractures, toe fractures and myositis. She may benefit from scheduled acetaminophen. Diathermy and frequent repositioning will be offered. Bowel intervention while on the analgesics. Dysphagia: A soft and bite-size diet is required at this point.   Speech-language pathology will be consulted. Upright for meds and meals. Thin liquids have been approved. Acute kidney injury: Minimizing fluctuations of blood pressure and the use of nephrotoxic medications. Serial chemistries to monitor renal function and the status of her hypokalemia. Supplementing her potassium as needed. Encouraging consistent oral hydration. Rib and toe fractures: Weightbearing as tolerated. Pain management. Incentive spirometry. Hypertension: Norvasc is required to control her systolic blood pressure. Target systolic blood pressure ranges 120-140. Vital signs are checked at rest and with activity. IBS: Regular bowel intervention with Colace and Senokot. [place] food choices encouraged. Zofran as needed. I personally performed a history and physical on this patient within 24 hours of admission to the rehab unit. I have reviewed the preadmission screening and concur with its findings without change. A detailed plan of care will be established by hospital day 4 and I attest the patient is appropriate for inpatient rehabilitation at this time. I have compared the patient's current functional status noted during my history and physical with that of the preadmission screen and I have found no significant differences.

## 2023-01-24 NOTE — PLAN OF CARE
ARU Interdisciplinary Plan of Care (IPOC)  [facility] Dr. 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]          CARE PLAN     NURSING:  Esther Baeza while on this unit will:      Bowel and Bladder   [x] Be continent of bowel and bladder      [x] Have an adequate number of bowel movements   [] Urinate with no urinary retention >300ml in bladder   [] Bladder Scan: (details)   [] Complete bladder protocol with lewis removal   [] Initiate Bladder Program to toilet every ___ hours   [] Initiate Bowel Program to toilet every ___hours   [] Bladder training    [] Bowel training  Pulmonary   [x] Maintain O2 SATs at 92% or greater  Pain Management   [x] Have pain managed while on ARU        [] Be pain free by discharge    [] Medication Management and Education  Maintenance of Skin Integrity/Wound Management   [x] Have no skin breakdown while on ARU   [] Have improved skin integrity via wound measurements   [] Have no signs/symptoms of infection via infection protection and monitoring at the          wound site  Fall Prevention   [x] Be free from injury during hospitalization via fall prevention measures     [] Disease management and Education  Precautions   [] Weight Bearing Precautions   [x] Swallowing Precautions   [x] Monitoring of Risks of Complications   [] DVT Prophylaxis    [x] Fluid/electrolyte/Nutrition Management    [x] Complete education with patient/family with understanding demonstrated for          in-room safety with transfers to bed, toilet, wheelchair, shower as well as                bathroom activities and hygiene. [] Adjustment   [x] Other:   Nursing interventions may include bowel/bladder training, education for medical assistive devices, medication education, O2 saturation management, energy conservation, stress management techniques, fall prevention, alarms protocol, seating and positioning, skin/wound care, pressure relief instruction,dressing changes,  infection protection, DVT prophylaxis, and/or assistance with in room safety with transfers to bed, toilet, wheelchair, shower as well as bathroom activities and hygiene. Patient/caregiver education for:   [x] Disease/sustained injury/management      [x] Medication Use   [] Surgical intervention   [x] Safety/Precautions   [x] Body mechanics and or joint protection   [x] Health maintenance         PHYSICAL THERAPY:  Goals:                               Long Term Goals  Time Frame for Long Term Goals : 10 tx days:  Long Term Goal 1: Pt will complete bed mobility (scooting, rolling R/L, and sup<->sit) Ind.  Long Term Goal 2: Pt will complete OOB transfers using 2WW/rollator Mod Ind. Long Term Goal 3: Pt will ambulate 10 ft and 50 ft with turns over level  surface using 2WW Mod Ind. Long Term Goal 4: Pt will ambulate >/=150 ft over level surface and 10 ft of uneven surface using rollator with SBA-Sup. Long Term Goal 5: Pt will ascend/descend curb step (2\"/4\" step height) and 4-5 steps using railings with Min A. Additional Goals?: Yes  Long term goal 6: Pt will complete object retrieval from the floor using 2WW/rollator and reacher Mod Ind. These goals were reviewed with this patient at the time of assessment and Juan Strange is in agreement. Plan of Care: Pt to be seen 5 days per week for a minimum of 60 minutes for 10 days.                 Current Treatment Recommendations: Strengthening, ROM, Balance training, Functional mobility training, Transfer training, Endurance training, Gait training, Stair training, Pain management, Home exercise program, Safety education & training, Patient/Caregiver education & training, Equipment evaluation, education, & procurement, Positioning, Therapeutic activities community reintegration,animal assisted therapy, and concurrent/group therapy.     PT IRF-SNOW scores and goals for initial assessment:   Bed Mobility:   Sit to Lying  Assistance Needed: Supervision or touching assistance  Comment: SBA without use of bed features  CARE Score: 4  Discharge Goal: Independent  Roll Left and Right  Comment: pt declined to attempt rolling to R due to anticipated pain on L ribcage; pt attempted rolling to L and was unable to fully complete due to pain on L ribcage  Reason if not Attempted: Not attempted due to medical condition or safety concerns  CARE Score: 88  Discharge Goal: Independent  Lying to Sitting on Side of Bed  Assistance Needed: Supervision or touching assistance  Comment: SBA without use of bed features  CARE Score: 4  Discharge Goal: Independent    Transfers:    Sit to Stand  Assistance Needed: Supervision or touching assistance  Comment: CGA using 2WW/rollator; definite use of BUE despite chronic L hand limitations (decreased ability to flex fingers)  CARE Score: 4  Discharge Goal: Independent  Chair/Bed-to-Chair Transfer  Assistance Needed: Supervision or touching assistance  Comment: CGA with 2WW/rollator and additional assist on O2 line management; pt transitions hands when sitting  CARE Score: 4  Discharge Goal: Independent  Toilet Transfer  Assistance needed: Supervision or touching assistance  Comment: CGA using 2WW and BSC frame over toilet with additional assist on O2 line management  CARE Score: 4  Car Transfer  Assistance Needed: Supervision or touching assistance  Comment: CGA using rollator with additional assist on O2 line management  CARE Score: 4  Discharge Goal: Independent    Ambulation:    Walking Ability  Does the Patient Walk?: Yes     Walk 10 Feet  Assistance Needed: Supervision or touching assistance  Comment: CGA using 2WW with additional assist on O2 line management  CARE Score: 4  Discharge Goal: Independent     Walk 50 Feet with Two Turns  Assistance Needed: Supervision or touching assistance  Comment: CGA using rollator and bilat post-op shoes and additional assist on O2 line/tank management  CARE Score: 4  Discharge Goal: Independent     Walk 150 Feet  Assistance Needed: Supervision or touching assistance  Comment: CGA using rollator and bilat post-op shoes and additional assist on O2 line/tank management; pt was able to ambulate up to 205 ft; reports increased pain on lateral aspect of R foot (3/10)  CARE Score: 4  Discharge Goal: Supervision or touching assistance     Walking 10 Feet on Uneven Surfaces  Assistance Needed: Supervision or touching assistance  Comment: CGA using rollator with additional assist on O2 line/tank management  CARE Score: 4  Discharge Goal: Supervision or touching assistance     1 Step (Curb)  Comment: pt refused; expressed fear regardless of step height (2\"/4\") and even with 2-person assist  Reason if not Attempted: Patient refused  CARE Score: 7  Discharge Goal: Partial/moderate assistance     4 Steps  Comment: safety concerns related to impaired strength and endurance and increased anxiety/fear  Reason if not Attempted: Not attempted due to medical condition or safety concerns  CARE Score: 88  Discharge Goal: Partial/moderate assistance     12 Steps  Comment: pt does not demonstrate potential to progressing to this mobility task due to progressive weakness from underlying IBM  Reason if not Attempted: Not attempted due to medical condition or safety concerns  CARE Score: 88  Discharge Goal: Not Attempted    Gait Deviations: []None []Slow olga  [] Increased [place]  [] Staggers []Deviated Path  [] Decreased step length  [] Decreased step height  []Decreased arm swing  [] Shuffles  [] Decreased head and trunk rotation []other:        Wheelchair:  w/c Ability: Wheelchair Ability  Uses a Wheelchair and/or Scooter?: No                Balance:        Object: Picking Up Object  Assistance Needed: Partial/moderate assistance  Comment: Min A using rollator and reacher; required stabilization of AD as pt performed required task as pt did not manage hand brakes; pt used R hand to manipulate reacher due to chronic L hand limitation  CARE Score: 3  Discharge Goal: Independent  OCCUPATIONAL THERAPY:  Goals:             Short Term Goals  Time Frame for Short Term Goals: STGs=LTGs :  Long Term Goals  Time Frame for Long Term Goals : 10-12 days or until d/c. Long Term Goal 1: Pt will complete oral hygiene and grooming tasks with IND. Long Term Goal 2: Pt will complete total body bathing with AE PRN and Mod I.  Long Term Goal 3: Pt will complete UB dressing with IND. Long Term Goal 4: Pt will complete LB dressing with AE PRN and Mod I.  Long Term Goal 5: Pt will doff/don footwear with AE PRN and Mod I. Additional Goals?: Yes  Long Term Goal 6: Pt will complete toileting with IND. Long Term Goal 7: Pt will complete functional transfers (bed, chair, toilet, shower) with DME PRN and Mod I.  Long Term Goal 8: Pt will perform therex/therax to facilitate an increase in strength/endurance with emphasis on dynamic standing balance/tolerance > 8 mins with Mod I.  Long Term Goal 9: Pt will perform meal prep tasks with Mod I. :    These goals were reviewed with this patient at the time of assessment and Carlitos Lowry is in agreement    Plan of Care:  Pt to be seen 5 days per week for a minimum of 60 minutes for 12 days.           Occupational Therapy Plan  Current Treatment Recommendations: Strengthening, ROM, Endurance training, Functional mobility training, Safety education & training, Patient/Caregiver education & training, Pain management, Equipment evaluation, education, & procurement, Positioning, Self-Care / ADL, Home management training, Balance training, Coordination training         cognitive training, home management, energy conservation training, community reintegration, splint fabrication, patient/caregiver education and training, animal assisted therapy, and concurrent and/or group therapy. OT IRF-SNOW scores and goals for initial assessment:    ADLs:    Eating: Eating  Assistance Needed: Setup or clean-up assistance  Comment: Pt requires assist opening packages/containers (longstanding) but able to self feed IND. CARE Score: 5  Discharge Goal: Set-up or clean-up assistance       Oral Hygiene: Oral Hygiene  Assistance Needed: Supervision or touching assistance  Comment: CG/SBA in stance to brush teeth. CARE Score: 4  Discharge Goal: Independent    UB/LB Bathing: Shower/Bathe Self  Assistance Needed: Supervision or touching assistance  Comment: Pt able to wash UB/LB majority seated, CGA in stance to wash sriram area/rear. CARE Score: 4  Discharge Goal: Independent    UB Dressing: Upper Body Dressing  Assistance Needed: Supervision or touching assistance  Comment: Sup to don long sleeve shirt. CARE Score: 4  Discharge Goal: Independent         LB Dressing: Lower Body Dressing  Assistance Needed: Supervision or touching assistance  Comment: Pt able to thread BLEs into pants and manage over hips with CGA. CARE Score: 4  Discharge Goal: Independent    Donning and [place] Footwear: Putting On/Taking Off Footwear  Assistance Needed: Supervision or touching assistance  Comment: SBA to doff/don socks EOB. CARE Score: 4  Discharge Goal: Independent      Toiletin [address] needed: Supervision or touching assistance  Comment: CGA in stance to manage clothing. Pt able to complete perineal hygiene after episode of urination while seated with SBA. CARE Score: 4  Discharge Goal: Independent      Toilet Transfers:    Toilet Transfer  Assistance needed: Supervision or touching assistance  Comment: CGA using 2WW and BSC frame over toilet with additional assist on O2 line management  CARE Score: 4  Discharge Goal: Independent      SPEECH THERAPY: (If ordered)  Plan of Care and Goals:   LTG       Duration/Frequency of Treatment  Duration of Treatment: No ST                       Short Term Goals  Time Frame for Short Term Goals: No ST                      Time Frame for Short Term Goals: No ST     LTG:                           Treatments may include speech/language/communication therapy, cognitive training, animal assisted therapy, group therapy, education, and/or dysphagia therapy based on the above goals. Co-treats where appropriate with PT or OT to facilitate patient goals in functional tasks. These goals were reviewed with this patient at the time of assessment and Kimi Zavaleta is in agreement. CASE MANAGEMENT:  Goals:   Assist patient/family with discharge planning, patient/family counseling, assistance in obtaining recommended equipment and other services, and coordination with insurance during ARU stay. Patient Goals:  Return to maximum level of independent function. Activities Prior to Admit:   Homemaking Responsibilities: Yes  Active : No  Mode of Transportation: SUV, Family  Occupation: Retired  Leisure & Hobbies: Pt enjoys paint by numbers, cooking and reading. Pt has cataracts. No pets at home but loves animals. Pt uses pill box for 1 pill at home. Spouse manages finances. Intensity of Therapy  Kimi Zavaleta will be seen a minimum of 3 hours of therapy per day/a minimum of 5 out of 7 days per week. [] In this rare instance due to the nature of this patient's medical involvement, this patient will be seen 15 hours per week (900 minutes within a 7 day period).     Treatments may include therapeutic exercises, gait training, neuromuscular re-ed, transfer training, community reintegration, bed mobility, w/c mobility and training, self care, home mgmt, cognitive training, energy conservation,dysphagia tx, speech/language/communication therapy, group therapy, and patient/family education. In addition, dietician/nutritionist may monitor calorie count as well as intake and collaboratively work with SLP on dietary upgrades. Neuropsychology/Psychology may evaluate and provide necessary support. Group therapy as appropriate to facilitate improved endurance, STR, COORD, function, safety, transfers, awareness and insight into deficits, problem solving, memory, and social interaction and engagement. Medical issues being managed closely and that require 24 hour availability of a physician:   [x] Swallowing Precautions                                     [] Weight bearing precautions   [] Wound Care                             [x] Infection Prevention   [x] DVT Prophylaxis/assessment              [x] Monitoring for complications    [x] Fall Precautions/Prevention                         [x] Fluid/Electrolyte/Nutrition Balance   [] Voice Protection                           [x] Medication Management   [x] Respiratory                   [x] Pain Mgmt   [x] Bowel/Bladder Fx    Medical Prognosis: [] Good  [x] Fair    [] Guarded   Total expected IRF days 14                                            Physician anticipated functional outcomes:  FWW and HHC OT/PT and supervision.   Rehab Goals:   [] Return to premorbid function of_______________________________.    [] Independent   [] Mod I  [x] Supervision  [] CGA   [] Min A   [] Mod A  Level for ambulation []without assistive device  [x] with assistive device        [] Independent   [] Mod I  [x] Supervision [] CGA   [] Min A   [] Mod A  Level for transfers []without assistive device  [x] with assistive device         [] Independent   [] Mod I  [x] Supervision [] CGA   [] Min A   [] Mod A Level with ADL's []without assistive device   [x] with assistive device     ___________________     Level with cognitive skills requiring [] No assist [x] Supervision [] Active Assist/Cues     [] Maximize level of mobility and ADL's to decrease burden on caregiver    IPOC brief synthesis of Preadmission Screen, Post-Admission Evaluation, and Therapy Evaluations:  Acute inpatient rehabilitation with occupational and physical therapy 180 minutes 5 out of every 7 days. Will address basic and  advancing mobility with self-care instruction and adaptive equipment training. Caregiver education will be offered. Expected length of stay  prior to a supervised level of function for discharge home with a walker and C OT/PT is 2 weeks. 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 We must provide cautious pain management with aggressive pulmonary hygiene measures and nutritional support. She needs adaptive equipment training, caregiver education and bowel and bladder monitoring with probable retraining. She needs DVT prophylaxis. Outpatient follow-up with her neurologist and PCP following rehab. DVT prophylaxis: Lovenox 40 mg subcu daily is being used cautiously concerning her pancytopenia. Serial blood counts and monitoring closely for signs of bleeding complications. Weightbearing activities will be pursued daily. GI prophylaxis will be added. Uncontrolled pain: The patient requires acetaminophen and oxycodone for her pain related to her rib fractures, toe fractures and myositis. She may benefit from scheduled acetaminophen. Diathermy and frequent repositioning will be offered. Bowel intervention while on the analgesics. Dysphagia: A soft and bite-size diet is required at this point. Speech-language pathology will be consulted. Upright for meds and meals. Thin liquids have been approved. Acute kidney injury: Minimizing fluctuations of blood pressure and the use of nephrotoxic medications.   Serial chemistries to monitor renal function and the status of her hypokalemia. Supplementing her potassium as needed. Encouraging consistent oral hydration. Rib and toe fractures: Weightbearing as tolerated. Pain management. Incentive spirometry. Hypertension: Norvasc is required to control her systolic blood pressure. Target systolic blood pressure ranges 120-140. Vital signs are checked at rest and with activity. IBS: Regular bowel intervention with Colace and Senokot. Fischer food choices encouraged. Zofran as needed. Anticipated discharge destination:    [] Home Independently   [x] Home with supervision    []SNF     [] Other       This plan has been reviewed with me in a language I understand. I have had the opportunity to include my input with my therapy team.    ________________________________________________   ______________________  Patient/Significant Other      Date    I have reviewed this initial plan of care and agree with its contents:    Title   Name    Date    Time    Physician: Gabe Morales MD 1/25/2023 9:49 AM    Case Mgmt:  Shamar Welch 1/24/2023  1103    OT: Yrn Ontiveros, [address] [facility], OTR/L #[id number] 01/24/23    PT: Jose Ruiz PT     01/24/2023     15:16    RN: Fatou Spencer RN 1/23/23    ST: [facility].  [place], [place], [address] [facility] 1/24/2023  6:06 PM      Dietician:     ADMIT DATE:1/23/2023

## 2023-01-24 NOTE — PROGRESS NOTES
Occupational Therapy                              [facility] ARU OCCUPATIONAL THERAPY EVALUATION    Chart Review:  Past Medical History:   Diagnosis Date    Abdominal aortic aneurysm (AAA) >39 mm diameter     Hypertension     IBS (irritable bowel syndrome)      Past Surgical History:   Procedure Laterality Date    TONSILLECTOMY       Social History:  Social/Functional History  Lives With: Spouse Michel Asp- in good health and can help PRN)  Type of Home: House  Home Layout: Multi-level (Tri-level 7 steps upstairs - however currently installing chair lift)  Home Access: Stairs to enter without rails  Entrance Stairs - Number of Steps: 2 ANNIE - Does have handle attached to the door of the home that she uses  Bathroom Shower/Tub: Tub/Shower unit  Bathroom Toilet: Standard  Bathroom Equipment: Shower chair, Grab bars in shower, 3-in-1 commode  Bathroom Accessibility: Walker accessible  Home Equipment: Cane, Rollator, Reacher, Walker, rolling  Has the patient had two or more falls in the past year or any fall with injury in the past year?: Yes (> 10 falls in the last year)  Receives Help From: Family  ADL Assistance: Independent  Homemaking Responsibilities: Yes  Meal Prep Responsibility: Secondary (Pt cooks 1-2 times a week.)  Laundry Responsibility: No ( completes.)  Cleaning Responsibility: No ( completes.)  Bill Paying/Finance Responsibility: No ( completes.)  Shopping Responsibility: Secondary (Pt states \"I go with him ~ 30% of the time\".  completes mostly.)  Dependent Care Responsibility: No  Health Care Management: Primary  Ambulation Assistance: Independent (Pt uses 2WW at night to go in and out of the bathroom, sometimes in the kitchen and states she uses the walker to help her get out of chairs.  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 Pt has cataracts. No pets at home but loves animals. Pt uses pill box for 1 pill at home. Spouse manages finances. Additional Comments: Pt sleeps in regular flat bed. Restrictions:  Restrictions/Precautions  Restrictions/Precautions: Fall Risk, General Precautions, Weight Bearing  Lower Extremity Weight Bearing Restrictions  Right Lower Extremity Weight Bearing: Weight Bearing As Tolerated  Left Lower Extremity Weight Bearing: Weight Bearing As Tolerated     Position Activity Restriction  Other position/activity restrictions: Per Dr. Hair Mckeon, Pt can either wear postop shoe to bilateral feet or regular shoe as tolerated. Pt has 2 L O2 via NC. Pain Level: 3  Pain Location:  L side ribs and bilateral feet    IRF-Hearing and Speech: Hearing, Speech, and Vision  Expression of Ideas and Wants: Without difficulty  Understanding Verbal and Non-Verbal Content: Understands  Ability to Hear: Adequate  Ability to See in Adequate Light: Adequate  IRF-COG:  Cognitive Patterns  Cognitive Pattern Assessment Used: BIMS  Repetition of Three Words (First Attempt): 3  Temporal Orientation: Year: Correct  Temporal Orientation: Month:  Accurate within 5 days  Temporal Orientation: Day: Correct  Able to recall \"sock: Yes, no cue required  Able to recall \"blue\": Yes, no cue required  Able to recall \"bed\": Yes, no cue required  BIMS Summary Score: 15  IRF-CAM (Confusion Assessment Method): Confusion Assessment Method (CAM)  Is there evidence of an acute change in mental status from the patient's baseline?: No  Inattention: Behavior not present  Disorganized thinking: Behavior not present  Altered level of consciousness: Behavior not present    Objective:  Observation/Palpation  Observation: Pt in high flores's position upon entrance with call light on needing to use restroom. Pt pleasant and agreeable to therapy. Vision  Vision: Impaired  Vision Exceptions: Cataracts, Wears glasses at all times  Vision - Basic Assessment  Visual History: Cataracts (Surgery has been delayed with falls.)  Hearing  Hearing: Within functional limits    ROM:      LUE AROM (degrees)  LUE AROM : WFL     Left Hand AROM (degrees)  Left Hand AROM: Pt unable to fully make fist, lacks thumb opposition     RUE AROM (degrees)  RUE AROM : WFL     Right Hand AROM (degrees)  Right Hand AROM: WFL    Strength:    LUE Strength  Gross LUE Strength: Exceptions to [facility]  L Hand General: 4/5  LUE Strength Comment: 4/5 grossly  RUE Strength  Gross RUE Strength: Exceptions to [facility]  R Hand General: 4/5  RUE Strength Comment: 4/5 grossly    Quality of Movement: Tone RUE  RUE Tone: Normotonic  Tone LUE  LUE Tone: Normotonic  Coordination  Movements Are Fluid And Coordinated: Yes  Coordination and Movement Description: Fine motor impairments       Sensation:    Sensation  Overall Sensation Status: Impaired (Numbness in bilateral thighs.)     ADLs:    Eating: Eating  Assistance Needed: Setup or clean-up assistance  Comment: Pt requires assist opening packages/containers (longstanding) but able to self feed IND. CARE Score: 5  Discharge Goal: Set-up or clean-up assistance       Oral Hygiene: Oral Hygiene  Assistance Needed: Supervision or touching assistance  Comment: CG/SBA in stance to brush teeth. CARE Score: 4  Discharge Goal: Independent    UB/LB Bathing: Shower/Bathe Self  Assistance Needed: Supervision or touching assistance  Comment: Pt able to wash UB/LB majority seated, CGA in stance to wash sriram area/rear. CARE Score: 4  Discharge Goal: Independent    UB Dressing: Upper Body Dressing  Assistance Needed: Supervision or touching assistance  Comment: Sup to don long sleeve shirt.   CARE Score: 4  Discharge Goal: Independent         LB Dressing:   Lower Body Dressing  Assistance Needed: Supervision or touching assistance  Comment: Pt able to thread BLEs into pants and manage over hips with CGA. CARE Score: 4  Discharge Goal: Independent    Donning and Levan Footwear: Putting On/Taking Off Footwear  Assistance Needed: Supervision or touching assistance  Comment: SBA to doff/don socks EOB. CARE Score: 4  Discharge Goal: Independent      Toiletin [address] needed: Supervision or touching assistance  Comment: CGA in stance to manage clothing. Pt able to complete perineal hygiene after episode of urination while seated with SBA. CARE Score: 4  Discharge Goal: Independent      Bed Mobility:    Bed mobility  Supine to Sit: Stand by assistance (HOB elevated and use of bed features.)    Transfers:    Transfers  Stand Pivot Transfers: Contact guard assistance  Sit to stand: Contact guard assistance, Minimal assistance  Stand to sit: Contact guard assistance        Toilet Transfer  Assistance needed: Supervision or touching assistance  Comment: CGA using 2WW and BSC frame over toilet with additional assist on O2 line management  CARE Score: 4  Discharge Goal: Independent    Functional Mobility:    Balance  Sitting Balance: Supervision  Standing Balance: Contact guard assistance  Standing Balance  Time: Several 1-2 min stands  Activity: ADL  Functional Mobility  Functional - Mobility Device: Rolling Walker  Activity: To/from bathroom  Assist Level: Contact guard assistanceApparatus Needs  Apparatus Needs: O2    Cognition:  Cognition  Arousal/Alertness: Appropriate responses to stimuli  Following Commands:  Follows all commands without difficulty  Attention Span: Appears intact  Safety Judgement: Decreased awareness of need for safety  Problem Solving: Decreased awareness of errors  Insights: Fully aware of deficits  Initiation: Does not require cues  Sequencing: Requires cues for some    Perception:  Perception  Overall Perceptual Status: WFL      Assessment:     Performance deficits / Impairments: Decreased functional mobility , Decreased strength, Decreased endurance, Decreased vision/visual deficit, Decreased ADL status, Decreased coordination, Decreased safe awareness, Decreased sensation, Decreased high-level IADLs, Decreased posture, Decreased ROM, Decreased balance, Decreased fine motor control    The patient is a 67year old female admitted onto ARU after hospitalization for sustaining multiple falls at home resulting in fractures of the left great toe proximal phalanx, left second and third metatarsal neck, right great toe proximal phalanx, and left 10th rib. Pt also with LLL atelectasis secondary to rib fracture (requiring 2 L supplemental O2 via NC). 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 Dr. Joy Souza is following conservative management of fractures and Pt is to remain WBAT to BLEs and can wear post op shoes or regular shoes depending on tolerance. During hospitalization, Pt also medically managed for KERI, hypokalemia, and dizziness. PTA, Pt living with spouse who is in good health and helps Pt PRN (he is home almost 24/7). Pt was completing all ADLs IND, however was requiring physical assistance getting in and out of tub shower unit. Pt's  completes majority of the home management tasks, but Pt does cook a meal 1-2 times a week. Today, Pt able to complete all ADLs and functional transfers/mobility with CGA. Pt already has a BSC frame over her toilet at home, therefore, Pt was evaluated with same equipment requiring CGA. Pt was on 2 L supplemental O2 on arrival with SpO2 at 93%. During functional mobility and ADLs on RA, Pt dropped as low as 89%. The QI, MMT, and ROM standardized assessments were used this date to determine the above performance deficits, which compromise pt's ability to safely complete ADLs/IADLs/mobility.   Pt will benefit from ARU OT services to increase functional performance and return to PLOF. Decision Making: Medium Complexity  Clinical Presentation:  Pt presents to this ARU with deficits including functional transfers/balance, endurance, strength, pain, FMC, and ADLs/IADLs. Patient education:   ARU procotol, Role of O.T., O.T. plan of care  []   Patient goal was established and reviewed in Rehab[facility]er with patient and/or family this date. REQUIRES OT FOLLOW-UP: Yes  Discharge Recommendations:  Home with spouse and HHC OT  Equipment Recommendations:  None, Pt has BSC, shower chair, and grab bars    Goals:     Short Term Goals  Time Frame for Short Term Goals: STGs=LTGs  Long Term Goals  Time Frame for Long Term Goals : 10-12 days or until d/c. Long Term Goal 1: Pt will complete oral hygiene and grooming tasks with IND. Long Term Goal 2: Pt will complete total body bathing with AE PRN and Mod I.  Long Term Goal 3: Pt will complete UB dressing with IND. Long Term Goal 4: Pt will complete LB dressing with AE PRN and Mod I.  Long Term Goal 5: Pt will doff/don footwear with AE PRN and Mod I. Additional Goals?: Yes  Long Term Goal 6: Pt will complete toileting with IND.   Long Term Goal 7: Pt will complete functional transfers (bed, chair, toilet, shower) with DME PRN and Mod I.  Long Term Goal 8: Pt will perform therex/therax to facilitate an increase in strength/endurance with emphasis on dynamic standing balance/tolerance > 8 mins with Mod I.  Long Term Goal 9: Pt will perform meal prep tasks with Mod I.    Plan:    Pt will be seen at least 60 minutes per day for a minimum of 5 days per week, plus group therapy as appropriate  Current Treatment Recommendations: Strengthening, ROM, Endurance training, Functional mobility training, Safety education & training, Patient/Caregiver education & training, Pain management, Equipment evaluation, education, & procurement, Positioning, Self-Care / ADL, Home management training, Balance training, Coordination training    OT Individual Minutes  Time In: 0845  Time Out: 1000  Minutes: 75                Number of Minutes/Billable Intervention      OT Evaluation 20   Therapeutic Exercise    ADL Self-care 55   Neuro Re-Ed    Therapeutic Activity    Group    Other:    TOTAL 75     Electronically signed by:    RICHARD oCrona, OTR/L #[id number]    1/24/2023, 3:56 PM

## 2023-01-24 NOTE — PROGRESS NOTES
Physical Therapy    [facility] ARU PHYSICAL THERAPY EVALUATION    Chart Review:  Past Medical History:   Diagnosis Date    Abdominal aortic aneurysm (AAA) >39 mm diameter     Hypertension     IBS (irritable bowel syndrome)      Past Surgical History:   Procedure Laterality Date    TONSILLECTOMY       Fall History: >10  Social History:  Social/Functional History  Lives With: Spouse Lydia Hackett- in good health and can help PRN)  Type of Home: House  Home Layout: Multi-level (Tri-level 7 steps upstairs - however currently installing chair lift)  Home Access: Stairs to enter without rails  Entrance Stairs - Number of Steps: 2 ANNIE - Does have handle attached to the door of the home that she uses  Bathroom Shower/Tub: Tub/Shower unit  Bathroom Toilet: Standard  Bathroom Equipment: Shower chair, Grab bars in shower, 3-in-1 commode  Bathroom Accessibility: Walker accessible  Home Equipment: Cane, Rollator, Reacher, Walker, rolling  Has the patient had two or more falls in the past year or any fall with injury in the past year?: Yes (> 10 falls in the last year)  Receives Help From: Family  ADL Assistance: Independent  Homemaking Responsibilities: Yes  Meal Prep Responsibility: Secondary (Pt cooks 1-2 times a week.)  Laundry Responsibility: No ( completes.)  Cleaning Responsibility: No ( completes.)  Bill Paying/Finance Responsibility: No ( completes.)  Shopping Responsibility: Secondary (Pt states \"I go with him ~ 30% of the time\".  completes mostly.)  Dependent Care Responsibility: No  Health Care Management: Primary  Ambulation Assistance: Independent (Pt uses 2WW at night to go in and out of the bathroom, sometimes in the kitchen and states she uses the walker to help her get out of chairs.  Pt uses rollator in the community.)  Transfer Assistance: Needs assistance (Pt requires assist with shower transfers and sometimes transfers out of a chair.)  Active : No  Mode of Transportation: SUV, Family  Education: HS education  Occupation: Retired  Type of Occupation: [employer] office-  Leisure & Hobbies: Pt enjoys paint by numbers, cooking and reading. Pt has cataracts. No pets at home but loves animals. Pt uses pill box for 1 pill at home. Spouse manages finances. Additional Comments: Pt sleeps in regular flat bed. Restrictions:  Restrictions/Precautions  Restrictions/Precautions: Fall Risk, General Precautions, Weight Bearing  Position Activity Restriction  Other position/activity restrictions: Per Dr. Lilian Dougherty, Pt can either wear postop shoe to bilateral feet or regular shoe as tolerated. Pt has 2 L O2 via NC. Subjective: Pt resting in recliner with BLE elevated, ate some for lunch, reports feeling constipated, states not sleeping well last night due to pain meds causing body itching, reports pain on L ribcage with coughing and reaching across other side of body. Pain Level: 7  Pain Location:  (pre-med for therapy)    Objective:  Orientation  Overall Orientation Status: Within Normal Limits  Orientation Level: Oriented X4        Vision  Vision: Impaired  Vision Exceptions: Cataracts, Wears glasses at all times  Hearing  Hearing: Within functional limits    Sensation:  Sensation  Overall Sensation Status: Impaired (Numbness in bilateral thighs.)    Observation:   Observation/Palpation  Observation: Pt in high flores's position upon entrance with call light on needing to use restroom. Pt pleasant and agreeable to therapy.     ROM:      AROM RLE (degrees)  RLE General AROM: decreased due to weakness (distal weakness >proximal)     AROM LLE (degrees)  LLE AROM : WFL  LLE General AROM: decreased due to weakness (distal weakness >proximal)                 Strength:    Strength RLE  Comment: grossly 3-/5; mild foot drop observed  Strength LLE  Comment: grossly 3+/5              Bed Mobility:   Lying to Sitting on Side of Bed  Assistance Needed: Supervision or touching assistance  Comment: SBA without use of bed features  CARE Score: 4  Discharge Goal: Independent  Roll Left and Right  Comment: pt declined to attempt rolling to R due to anticipated pain on L ribcage; pt attempted rolling to L and was unable to fully complete due to pain on L ribcage  Reason if not Attempted: Not attempted due to medical condition or safety concerns  CARE Score: 88  Discharge Goal: Independent  Sit to Lying  Assistance Needed: Supervision or touching assistance  Comment: SBA without use of bed features  CARE Score: 4  Discharge Goal: Independent    Transfers:    Sit to Stand  Assistance Needed: Supervision or touching assistance  Comment: CGA using 2WW/rollator; definite use of BUE despite chronic L hand limitations (decreased ability to flex fingers)  CARE Score: 4  Discharge Goal: Independent  Chair/Bed-to-Chair Transfer  Assistance Needed: Supervision or touching assistance  Comment: CGA with 2WW/rollator and additional assist on O2 line management; pt transitions hands when sitting  CARE Score: 4  Discharge Goal: Independent  Toilet Transfer  Assistance needed: Supervision or touching assistance  Comment: CGA using 2WW and BSC frame over toilet with additional assist on O2 line management  CARE Score: 4  Car Transfer  Assistance Needed: Supervision or touching assistance  Comment: CGA using rollator with additional assist on O2 line management  CARE Score: 4  Discharge Goal: Independent    Ambulation:   Device used PTA: 2WW/rollator    Walking Ability  Does the Patient Walk?: Yes     Walk 10 Feet  Assistance Needed: Supervision or touching assistance  Comment: CGA using 2WW with additional assist on O2 line management  CARE Score: 4  Discharge Goal: Independent     Walk 50 Feet with Two Turns  Assistance Needed: Supervision or touching assistance  Comment: CGA using rollator and bilat post-op shoes and additional assist on O2 line/tank management  CARE Score: 4  Discharge Goal: Independent Walk 150 Feet  Assistance Needed: Supervision or touching assistance  Comment: CGA using rollator and bilat post-op shoes and additional assist on O2 line/tank management; pt was able to ambulate up to 205 ft; reports increased pain on lateral aspect of R foot (3/10)  CARE Score: 4  Discharge Goal: Supervision or touching assistance     Walking 10 Feet on Uneven Surfaces  Assistance Needed: Supervision or touching assistance  Comment: CGA using rollator with additional assist on O2 line/tank management  CARE Score: 4  Discharge Goal: Supervision or touching assistance     1 Step (Curb)  Comment: pt refused; expressed fear regardless of step height (2\"/4\") and even with 2-person assist  Reason if not Attempted: Patient refused  CARE Score: 7  Discharge Goal: Partial/moderate assistance     4 Steps  Comment: safety concerns related to impaired strength and endurance and increased anxiety/fear  Reason if not Attempted: Not attempted due to medical condition or safety concerns  CARE Score: 88  Discharge Goal: Partial/moderate assistance     12 Steps  Comment: pt does not demonstrate potential to progressing to this mobility task due to progressive weakness from underlying IBM  Reason if not Attempted: Not attempted due to medical condition or safety concerns  CARE Score: 88  Discharge Goal: Not Attempted    Gait Deviations: []None []Slow olga  [] Increased [place]  [] Staggers []Deviated Path  [] Decreased step length  [] Decreased step height  []Decreased arm swing  [] Shuffles  [] Decreased head and trunk rotation  [x]other: impaired heel-toe pattern, mild R foot drop but uses compensatory knee and hip movements to compensate and no L foot catch observed        Wheelchair:  w/c Ability: Wheelchair Ability  Uses a Wheelchair and/or Scooter?: No                Balance:        Object: Picking Up Object  Assistance Needed: Partial/moderate assistance  Comment: Min A using rollator and reacher; required stabilization of AD as pt performed required task as pt did not manage hand brakes; pt used R hand to manipulate reacher due to chronic L hand limitation  CARE Score: 3  Discharge Goal: Independent      Assessment:   The patient is a 67year old female admitted onto ARU after hospitalization for 2 falls recently. Pt with progressive decline in ambulation and multiple falls in the past 2 years due to progression of inclusion body myositis. 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 Pt with poor tolerance to prednisone or any other immunosuppressive medications. Imaging revealed Acute fracture in the posterior left 10th rib with mild to moderate displacement and acute nondisplaced fracture of the posterior left 11th rib. The subtle deformities in the left 8th and 9th ribs may be from old injury. Pt with LLL atelectasis due to rib Fx. X-ray of R foot revealed \"impacted fracture at the base of the 1st proximal phalanx and a chronic appearing fracture at the base of the 5th proximal phalanx, and soft tissue swelling on dorsal aspect of the forefoot. \" Imaging of L foot  revealed \"what appears to be a comminuted, mildly impacted intra-articular fracture at the base of the 1st proximal phalanx. There are also likely acute, mildly displaced fractures of the 2nd and 3rd metatarsal necks. No dislocation is identified. There is soft swelling along the dorsal aspect of the foot. \" Pt required medical management  for KERI, hypokalemia, and dizziness.  Pt tolerated 1.5L O2 via nasal cannula throughout PT eval, had different sites and intensity of pain (R foot pain during ambulation, L ribcage pain with coughing and reaching across, and LBP at the end of PT eval once transferred back to bed), had knowledge and awareness of Sx related to [address] and so she had insight to the anticipated response of her body with physical exertion, had chronic limitations on L hand and R foot due to IBM but was able to functionally use it for transfers and ambulate using 2WW/rollator, had increased fatigue throughout PT eval, expressed fear to attempt any height of step/stairs today due to progressive weakness from IBM and Hx of falls. Pt declined use of manual w/c while at ARU and on discharge stating motivation to maintain ability to ambulate as long as possible. It is anticipated that pt's performance may be variable depending on level of pain, fatigue, and weakness. Pt is a high fall risk and will need close monitoring of her Sx during progressive mobility training. Pt's RLE is weaker at baseline and L foot appears to have more acute Fx and so determining most appropriate step-to pattern to safely manage step/stairs will need ongoing assessment. Body Structures, Functions, Activity Limitations Requiring Skilled Therapeutic Intervention: Decreased functional mobility , Decreased ADL status, Decreased ROM, Decreased strength, Decreased endurance, Decreased sensation, Decreased balance, Increased pain, Decreased high-level IADLs     Therapy Prognosis: Good, Guarded  Decision Making: High Complexity  Clinical Presentation: unstable/unpredictable characteristics      Patient education:   ARU schedule, ARU expectations for participation, plan of care  Treatment Initiated:  Functional mobility training, gait training, patient education  Barriers to Improvement:  pain, Sx of IBM (fatigue, weakness), anxiety/fear with step/stairs, pulmonary status, decreased appetite   Discharge Recommendations:  [facility] PT   Equipment Recommendations:  has 2WW/rollator     Goals:  Patient Goals   Patient Goals : to reduce falls, continue to be able to ambulate, to return home     Long Term Goals  Time Frame for Long Term Goals : 10 tx days:  Long Term Goal 1: Pt will complete bed mobility (scooting, rolling R/L, and sup<->sit) Ind.  Long Term Goal 2: Pt will complete OOB transfers using 2WW/rollator Mod Ind.   Long Term Goal 3: Pt will ambulate 10 ft and 50 ft with turns over level  surface using 2WW Mod Ind. Long Term Goal 4: Pt will ambulate >/=150 ft over level surface and 10 ft of uneven surface using rollator with SBA-Sup. Long Term Goal 5: Pt will ascend/descend curb step (2\"/4\" step height) and 4-5 steps using railings with Min A. Additional Goals?: Yes  Long term goal 6: Pt will complete object retrieval from the floor using 2WW/rollator and reacher Mod Ind.   Plan:    Requires PT Follow-Up: Yes  Pt will be seen at least 60 minutes per day for a minimum of 5 days per week, plus group therapy as appropriate  Physcial Therapy Plan  Current Treatment Recommendations: Strengthening, ROM, Balance training, Functional mobility training, Transfer training, Endurance training, Gait training, Stair training, Pain management, Home exercise program, Safety education & training, Patient/Caregiver education & training, Equipment evaluation, education, & procurement, Positioning, Therapeutic activities    PT Individual Minutes  Time In: 6133  Time Out: 1435  Minutes: 66  Variance: +3  Reason: extra time to complete tasks         Timed Code Treatment Minutes: 63 Minutes    Number of Minutes/Billable Intervention    PT Evaluation 15   Gait Training 30   Therapeutic Exercise    Neuro Re-Ed    Therapeutic Activity 33   Wheelchair Propulsion    Group    Other:    TOTAL 78       Electronically signed by:    Cezar Palma, PT  1/24/2023, 15:24

## 2023-01-24 NOTE — CARE COORDINATION
Case Management Admission Note      Patient:Kanika Cullen      [id number]  [facility] [place]:[id number]  Rehab Dx/Hx: Inclusion body myositis (IBM) [G72.41]  Inclusion body myositis [G72.41]    Chief Complaint:   Past Medical History:   Diagnosis Date    Abdominal aortic aneurysm (AAA) >39 mm diameter     Hypertension     IBS (irritable bowel syndrome)      Past Surgical History:   Procedure Laterality Date    TONSILLECTOMY       No Known Allergies  Precautions: falls    Date of Admit: 2023  Room #: 1007/1007-A      Current functional status at time of admit:        Home Living/DME Available:      Type of Home: House  Home Access: Stairs to enter without rails  Bathroom Shower/Tub: Tub/Shower unit  Bathroom Toilet: Standard  Bathroom Equipment: Shower chair, Grab bars in shower, 3-in-1 commode  Home Equipment: Cane, Rollator, Reacher, Walker, rolling       IADL Hx:   Homemaking Responsibilities: Yes  Active : No  Mode of Transportation: SUV, Family  Occupation: Retired  Leisure & Hobbies: Pt enjoys paint by numbers, cooking and reading. Pt has cataracts. No pets at home but loves animals. Pt uses pill box for 1 pill at home. Spouse manages finances. Spouse: Vickie Rodriguez  Family:    Comments:  patient plans dc home with very supportive spouse. 2 ANNIE the home. The home has 7 stairs to the main level and a stair lift was been ordered. Patient has RW/4WW/SPC/BSC/SC PTA. No o2 PTA. Patient has no specific dc concerns at this time. Whiteboard updated.     Ysabel Forrest, 2023, 11:03 AM

## 2023-01-25 PROBLEM — S22.32XA CLOSED FRACTURE OF ONE RIB OF LEFT SIDE: Status: ACTIVE | Noted: 2023-01-25

## 2023-01-25 PROBLEM — S92.919A: Status: ACTIVE | Noted: 2023-01-25

## 2023-01-25 PROBLEM — D61.818 PANCYTOPENIA (HCC): Status: ACTIVE | Noted: 2023-01-25

## 2023-01-25 PROBLEM — E87.6 HYPOKALEMIA: Status: ACTIVE | Noted: 2023-01-25

## 2023-01-25 PROBLEM — R13.12 OROPHARYNGEAL DYSPHAGIA: Status: ACTIVE | Noted: 2023-01-25

## 2023-01-25 PROBLEM — R53.1 GENERALIZED WEAKNESS: Status: ACTIVE | Noted: 2023-01-25

## 2023-01-25 PROBLEM — R26.9 GAIT DISTURBANCE: Status: ACTIVE | Noted: 2023-01-25

## 2023-01-25 PROBLEM — K58.2 IRRITABLE BOWEL SYNDROME WITH BOTH CONSTIPATION AND DIARRHEA: Status: ACTIVE | Noted: 2023-01-25

## 2023-01-25 PROBLEM — N17.0 ACUTE KIDNEY INJURY (AKI) WITH ACUTE TUBULAR NECROSIS (ATN) (HCC): Status: ACTIVE | Noted: 2023-01-25

## 2023-01-25 PROCEDURE — 97116 GAIT TRAINING THERAPY: CPT

## 2023-01-25 PROCEDURE — 94150 VITAL CAPACITY TEST: CPT

## 2023-01-25 PROCEDURE — 97535 SELF CARE MNGMENT TRAINING: CPT

## 2023-01-25 PROCEDURE — 6370000000 HC RX 637 (ALT 250 FOR IP): Performed by: PHYSICAL MEDICINE & REHABILITATION

## 2023-01-25 PROCEDURE — 97110 THERAPEUTIC EXERCISES: CPT

## 2023-01-25 PROCEDURE — 6370000000 HC RX 637 (ALT 250 FOR IP): Performed by: STUDENT IN AN ORGANIZED HEALTH CARE EDUCATION/TRAINING PROGRAM

## 2023-01-25 PROCEDURE — 1280000000 HC REHAB R&B

## 2023-01-25 PROCEDURE — 97530 THERAPEUTIC ACTIVITIES: CPT

## 2023-01-25 PROCEDURE — 94761 N-INVAS EAR/PLS OXIMETRY MLT: CPT

## 2023-01-25 PROCEDURE — 99232 SBSQ HOSP IP/OBS MODERATE 35: CPT | Performed by: PHYSICAL MEDICINE & REHABILITATION

## 2023-01-25 PROCEDURE — 6360000002 HC RX W HCPCS: Performed by: STUDENT IN AN ORGANIZED HEALTH CARE EDUCATION/TRAINING PROGRAM

## 2023-01-25 PROCEDURE — 92610 EVALUATE SWALLOWING FUNCTION: CPT

## 2023-01-25 RX ORDER — ACETAMINOPHEN 325 MG/1
650 TABLET ORAL
Status: DISCONTINUED | OUTPATIENT
Start: 2023-01-25 | End: 2023-02-01 | Stop reason: HOSPADM

## 2023-01-25 RX ORDER — PANTOPRAZOLE SODIUM 40 MG/1
40 TABLET, DELAYED RELEASE ORAL
Status: DISCONTINUED | OUTPATIENT
Start: 2023-01-25 | End: 2023-02-01 | Stop reason: HOSPADM

## 2023-01-25 RX ADMIN — OXYCODONE HYDROCHLORIDE 10 MG: 10 TABLET ORAL at 06:51

## 2023-01-25 RX ADMIN — ACETAMINOPHEN 650 MG: 325 TABLET ORAL at 17:36

## 2023-01-25 RX ADMIN — Medication 3 MG: at 22:14

## 2023-01-25 RX ADMIN — OXYCODONE HYDROCHLORIDE 5 MG: 5 TABLET ORAL at 01:55

## 2023-01-25 RX ADMIN — Medication 3 MG: at 01:55

## 2023-01-25 RX ADMIN — PANTOPRAZOLE SODIUM 40 MG: 40 TABLET, DELAYED RELEASE ORAL at 10:36

## 2023-01-25 RX ADMIN — AMLODIPINE BESYLATE 5 MG: 5 TABLET ORAL at 09:02

## 2023-01-25 RX ADMIN — ENOXAPARIN SODIUM 40 MG: 100 INJECTION SUBCUTANEOUS at 20:48

## 2023-01-25 RX ADMIN — DOCUSATE SODIUM 100 MG: 100 CAPSULE, LIQUID FILLED ORAL at 09:02

## 2023-01-25 RX ADMIN — ACETAMINOPHEN 650 MG: 325 TABLET ORAL at 12:11

## 2023-01-25 RX ADMIN — SENNOSIDES 8.6 MG: 8.6 TABLET, COATED ORAL at 20:48

## 2023-01-25 RX ADMIN — ACETAMINOPHEN 650 MG: 325 TABLET ORAL at 20:48

## 2023-01-25 ASSESSMENT — PAIN - FUNCTIONAL ASSESSMENT
PAIN_FUNCTIONAL_ASSESSMENT: PREVENTS OR INTERFERES SOME ACTIVE ACTIVITIES AND ADLS
PAIN_FUNCTIONAL_ASSESSMENT: PREVENTS OR INTERFERES SOME ACTIVE ACTIVITIES AND ADLS

## 2023-01-25 ASSESSMENT — PAIN DESCRIPTION - ORIENTATION
ORIENTATION: LEFT
ORIENTATION: LOWER;LEFT

## 2023-01-25 ASSESSMENT — PAIN DESCRIPTION - LOCATION
LOCATION: RIB CAGE
LOCATION: RIB CAGE

## 2023-01-25 ASSESSMENT — PAIN SCALES - GENERAL
PAINLEVEL_OUTOF10: 0
PAINLEVEL_OUTOF10: 0
PAINLEVEL_OUTOF10: 7
PAINLEVEL_OUTOF10: 8
PAINLEVEL_OUTOF10: 0

## 2023-01-25 ASSESSMENT — PAIN DESCRIPTION - DESCRIPTORS
DESCRIPTORS: SPASM
DESCRIPTORS: JABBING

## 2023-01-25 NOTE — PROGRESS NOTES
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 Prior (baseline) level of function: Independent. Current level of function:         Current  IRF-SNOW and Goals:   Occupational Therapy:    Short Term Goals  Time Frame for Short Term Goals: STGs=LTGs :   Long Term Goals  Time Frame for Long Term Goals : 10-12 days or until d/c. Long Term Goal 1: Pt will complete oral hygiene and grooming tasks with IND. Long Term Goal 2: Pt will complete total body bathing with AE PRN and Mod I.  Long Term Goal 3: Pt will complete UB dressing with IND. Long Term Goal 4: Pt will complete LB dressing with AE PRN and Mod I.  Long Term Goal 5: Pt will doff/don footwear with AE PRN and Mod I. Additional Goals?: Yes  Long Term Goal 6: Pt will complete toileting with IND. Long Term Goal 7: Pt will complete functional transfers (bed, chair, toilet, shower) with DME PRN and Mod I.  Long Term Goal 8: Pt will perform therex/therax to facilitate an increase in strength/endurance with emphasis on dynamic standing balance/tolerance > 8 mins with Mod I.  Long Term Goal 9: Pt will perform meal prep tasks with Mod I. :                                       Eating: Eating  Assistance Needed: Setup or clean-up assistance  Comment: Pt requires assist opening packages/containers (longstanding) but able to self feed IND. CARE Score: 5  Discharge Goal: Set-up or clean-up assistance       Oral Hygiene: Oral Hygiene  Assistance Needed: Supervision or touching assistance  Comment: CG/SBA in stance to brush teeth.   CARE Score: 4  Discharge Goal: Independent    UB/LB Bathing: Shower/Bathe Self  Assistance Needed: Supervision or touching assistance  Comment: Pt able to wash UB/LB majority seated, CGA in stance to wash sriram area/rear. CARE Score: 4  Discharge Goal: Independent    UB Dressing: Upper Body Dressing  Assistance Needed: Supervision or touching assistance  Comment: Sup to don long sleeve shirt. CARE Score: 4  Discharge Goal: Independent         LB Dressing: Lower Body Dressing  Assistance Needed: Supervision or touching assistance  Comment: Pt able to thread BLEs into pants and manage over hips with CGA. CARE Score: 4  Discharge Goal: Independent    Donning and [place] Footwear: Putting On/Taking Off Footwear  Assistance Needed: Supervision or touching assistance  Comment: SBA to doff/don socks EOB. CARE Score: 4  Discharge Goal: Independent      Toiletin [address] needed: Supervision or touching assistance  Comment: CGA in stance to manage clothing. Pt able to complete perineal hygiene after episode of urination while seated with SBA. CARE Score: 4  Discharge Goal: Independent      Toilet Transfers: Toilet Transfer  Assistance needed: Supervision or touching assistance  Comment: CGA using 2WW and BSC frame over toilet with additional assist on O2 line management  CARE Score: 4  Discharge Goal: Independent    Physical Therapy:         Long Term Goals  Time Frame for Long Term Goals : 10 tx days:  Long Term Goal 1: Pt will complete bed mobility (scooting, rolling R/L, and sup<->sit) Ind.  Long Term Goal 2: Pt will complete OOB transfers using 2WW/rollator Mod Ind. Long Term Goal 3: Pt will ambulate 10 ft and 50 ft with turns over level  surface using 2WW Mod Ind. Long Term Goal 4: Pt will ambulate >/=150 ft over level surface and 10 ft of uneven surface using rollator with SBA-Sup.   Long Term Goal 5: Pt will ascend/descend curb step (2\"/4\" step height) and 4-5 steps using railings with Min A. Additional Goals?: Yes  Long term goal 6: Pt will complete object retrieval from the floor using 2WW/rollator and reacher Mod Ind.       Bed Mobility:   Sit to Lying  Assistance Needed: Supervision or touching assistance  Comment: SBA without use of bed features  CARE Score: 4  Discharge Goal: Independent  Roll Left and Right  Comment: pt declined to attempt rolling to R due to anticipated pain on L ribcage; pt attempted rolling to L and was unable to fully complete due to pain on L ribcage  Reason if not Attempted: Not attempted due to medical condition or safety concerns  CARE Score: 88  Discharge Goal: Independent  Lying to Sitting on Side of Bed  Assistance Needed: Supervision or touching assistance  Comment: SBA without use of bed features  CARE Score: 4  Discharge Goal: Independent    Transfers:    Sit to Stand  Assistance Needed: Supervision or touching assistance  Comment: CGA using 2WW/rollator; definite use of BUE despite chronic L hand limitations (decreased ability to flex fingers)  CARE Score: 4  Discharge Goal: Independent  Chair/Bed-to-Chair Transfer  Assistance Needed: Supervision or touching assistance  Comment: CGA with 2WW/rollator and additional assist on O2 line management; pt transitions hands when sitting  CARE Score: 4  Discharge Goal: Independent  Toilet Transfer  Assistance needed: Supervision or touching assistance  Comment: CGA using 2WW and BSC frame over toilet with additional assist on O2 line management  CARE Score: 4  Car Transfer  Assistance Needed: Supervision or touching assistance  Comment: CGA using rollator with additional assist on O2 line management  CARE Score: 4  Discharge Goal: Independent    Ambulation:    Walking Ability  Does the Patient Walk?: Yes     Walk 10 Feet  Assistance Needed: Supervision or touching assistance  Comment: CGA using 2WW with additional assist on O2 line management  CARE Score: 4  Discharge Goal: Independent     Walk 50 Feet with Two Turns  Assistance Needed: Supervision or touching assistance  Comment: CGA using rollator and bilat post-op shoes and additional assist on O2 line/tank management  CARE Score: 4  Discharge Goal: Independent     Walk 150 Feet  Assistance Needed: Supervision or touching assistance  Comment: CGA using rollator and bilat post-op shoes and additional assist on O2 line/tank management; pt was able to ambulate up to 205 ft; reports increased pain on lateral aspect of R foot (3/10)  CARE Score: 4  Discharge Goal: Supervision or touching assistance     Walking 10 Feet on Uneven Surfaces  Assistance Needed: Supervision or touching assistance  Comment: CGA using rollator with additional assist on O2 line/tank management  CARE Score: 4  Discharge Goal: Supervision or touching assistance     1 Step (Curb)  Comment: pt refused; expressed fear regardless of step height (2\"/4\") and even with 2-person assist  Reason if not Attempted: Patient refused  CARE Score: 7  Discharge Goal: Partial/moderate assistance     4 Steps  Comment: safety concerns related to impaired strength and endurance and increased anxiety/fear  Reason if not Attempted: Not attempted due to medical condition or safety concerns  CARE Score: 88  Discharge Goal: Partial/moderate assistance     12 Steps  Comment: pt does not demonstrate potential to progressing to this mobility task due to progressive weakness from underlying IBM  Reason if not Attempted: Not attempted due to medical condition or safety concerns  CARE Score: 88  Discharge Goal: Not Attempted       Wheelchair:  w/c Ability: Wheelchair Ability  Uses a Wheelchair and/or Scooter?: No                Balance:        Object: Picking Up Object  Assistance Needed: Partial/moderate assistance  Comment: Min A using rollator and reacher; required stabilization of AD as pt performed required task as pt did not manage hand brakes; pt used R hand to manipulate reacher due to chronic L hand limitation  CARE Score: 3  Discharge Goal: Independent    I      Exam:    Blood pressure (!) 152/74, pulse 77, temperature 98.1 °F (36.7 °C), resp. rate 18, height 5' 4\" (1.626 m), weight 134 lb 0.6 oz (60.8 kg), SpO2 97 %. General: Patient was seen sitting up in bed. She was having lunch at the time and was feeding herself cautiously. HEENT: Symmetric facial expression. Conjugate eye movements. Soft-spoken but clear. MMM. Neck supple. Pulmonary: Shallow respirations with no wheezes or rales. Cardiac: Regular rate and rhythm. Abdomen: Patient's abdomen is soft and nondistended. Bowel sounds were present throughout. There was no rebound, guarding or masses noted. Upper extremities: Weak attempts at left hand closure and digit abduction. More normal strength proximally. Sensation intact. Lower extremities: Incomplete left ankle dorsiflexion. Her feet were very tender to touch. Calf soft. She was able to flex both hips actively while seated. Sitting balance was good. Standing balance was poor. Lab Results   Component Value Date    WBC 4.6 01/21/2023    HGB 11.4 (L) 01/21/2023    HCT 34.7 (L) 01/21/2023    MCV 94.8 01/21/2023     01/21/2023     No results found for: INR, PROTIME  Lab Results   Component Value Date    CREATININE 1.1 01/21/2023    BUN 37 (H) 01/21/2023     01/21/2023    K 3.5 01/21/2023    CL 98 (L) 01/21/2023    CO2 29 01/21/2023     Lab Results   Component Value Date    ALT 20 01/21/2023    AST 23 01/21/2023    ALKPHOS 67 01/21/2023    BILITOT 0.7 01/21/2023       Expected length of stay  prior to a supervised level of function for discharge home with a walker and C OT/PT is 2 weeks. Recommendations:    Inclusion body myositis: We are developing the routine for her daily occupational and physical therapy with speech-language pathology. Providing cautious pain management with aggressive pulmonary hygiene measures and nutritional support.   Ongoing adaptive equipment training, caregiver education and bowel and bladder monitoring with probable retraining. She needs DVT prophylaxis. Outpatient follow-up with her neurologist and PCP following rehab. Verbal cues and moderate physical assistance for transfers. DVT prophylaxis: Lovenox 40 mg subcu daily is being used cautiously concerning her pancytopenia. Serial blood counts and monitoring closely for signs of bleeding complications. Weightbearing activities are expected to be limited but will be tried daily. GI prophylaxis to be added. No signs of acute blood loss. Uncontrolled pain: The patient requires acetaminophen and oxycodone for her pain related to her rib fractures, toe fractures and myositis. I will offer her scheduled acetaminophen. Diathermy and frequent repositioning are available. Bowel intervention while on the analgesics. Dysphagia: A soft and bite-size diet is required at this point. Speech-language pathology is working on swallowing technique recovery. Upright for meds and meals. Thin liquids have been approved. Acute kidney injury: Minimizing fluctuations of blood pressure and the use of nephrotoxic medications. Serial chemistries to monitor renal function and the status of her hypokalemia. Supplementing her potassium as needed. Encouraging consistent oral hydration. Rib and toe fractures: Weightbearing as tolerated. Pain management. Incentive spirometry. Hypertension: Norvasc is required to control her systolic blood pressure. Target systolic blood pressure ranges 120-140. Vital signs are checked at rest and with activity. Her blood pressure is just below the target range today. Monitoring closely. IBS: Regular bowel intervention with Colace and Senokot. [place] food choices encouraged. Zofran as needed.

## 2023-01-25 NOTE — PROGRESS NOTES
Physical Therapy    [x] daily progress note       [] discharge       Patient Name:  Ivory Martinez   :  1950 MRN: [id number]  Room:  [address] [place] [address] Date of Admission: 2023  Rehabilitation Diagnosis:   Inclusion body myositis (IBM) [G72.41]  Inclusion body myositis [G72.41]       Date 2023       Day of ARU Week:  3   Time IN/OUT 1300/1400   Individual Tx Minutes 60   Group Tx Minutes    Co-Treat Minutes    Concurrent Tx Minutes    TOTAL Tx Time Mins 60   Variance Time    Variance Time []   Refusal due to:     []   Medical hold/reason:    []   Illness   []   Off Unit for test/procedure  []   Extra time needed to complete task  []   Therapeutic need  []   Other (specify):   Restrictions Restrictions/Precautions  Restrictions/Precautions: Fall Risk, General Precautions, Weight Bearing  Position Activity Restriction  Other position/activity restrictions: Per Dr. Naina Lopez, Pt can either wear postop shoe to bilateral feet or regular shoe as tolerated. Pt has 2 L O2 via NC. Interdisciplinary communication [x]   Cleared for therapy per nursing; RN approved to trial room air during gait training and to monitor SpO2 that it stays >/=90%   []   RN notified about issues during session  []   RN updated on pt performance  []   Spoke with   [x]   Spoke with BALDERRAMA about pt performance and pulmonary status and to practice donning regular shoes in preparation for gait training tomorrow.   []   Spoke with MD  []   Other:    Subjective observations and cognitive status: Pt resting in recliner with BLE dangling, states tolerating shower activity earlier today, on 1L O2 via nasal cannula, states not tolerating minced food last night.     Pain level/location:   4 /10       Location: L ribcage; feet (sore)    Discharge recommendations  Anticipated discharge date:  TBD  Destination: []home alone   []home alone with assist PRN     [x] home w/ family      [] Continuous supervision  []SNF    [] Assisted living [] Other:  Continued therapy: [x]HHC PT  []OUTPATIENT PT   [] No Further PT  []SNF PT  Caregiver training recommended: []Yes  [] No   Equipment needs: has 2WW and rollator      PT IRF-SNOW scores and goals for discharge assessment:     Sit to Stand  Assistance Needed: Supervision or touching assistance  Comment: SBA using rollator from recliner  CARE Score: 4  Discharge Goal: Independent    Chair/Bed-to-Chair Transfer  Assistance Needed: Supervision or touching assistance  Comment: SBA-Sup using rollator  CARE Score: 4  Discharge Goal: Independent      Walk 10 Feet?   Walk 10 Feet?: Yes    Walking Ability  Does the Patient Walk?: Yes    Walk 10 Feet  Assistance Needed: Supervision or touching assistance  Comment: CGA using rollator and post-op shoes  CARE Score: 4  Discharge Goal: Independent    Walk 50 Feet with Two Turns  Assistance Needed: Supervision or touching assistance  Comment: CGA using rollator and post-op shoes  CARE Score: 4  Discharge Goal: Independent    Walk 150 Feet  Assistance Needed: Supervision or touching assistance  Comment: CGA using rollator and post-op shoes; reports increased pain on lateral aspect of R foot but was not antalgic; pt was able to ambulate up to 296 ft and SpO2 was 87% after maximal exertion; SpO2 gradually increased to 90-94% during sitting break and with PLB  CARE Score: 4  Discharge Goal: Supervision or touching assistance    Additional Therapeutic activities/exercises completed this date:     []   Nu-step:  Time:        Level:         #Steps:       []   Rebounder:    []  Seated     []  Standing        []   Balance training         []   Postural training    []   Supine ther ex (reps/sets):     [x]   Seated ther ex (reps/sets):  [x] ankle PF/DF x 20  reps x 1 set (R<L)  [x] ankle circles x 10 reps x 1 set (R<L)   [x] alternating LAQ x 5 sec hold x 10 reps x 1 set (AAROM on R using leg  to achieve full extension)   [x] hip marches x 10 reps x 1 set (AROM on R/L)   [x] chair push-ups x 5 reps x 2 set s       []   Standing ther ex (reps/sets):     []   Other: Toileting activity completed with    []   Other:    Comments:      Patient/Caregiver Education and Training:   []   Role of PT  []   Education about Dx  []   Use of call light for assist   []   HEP provided and explained   [x]   Treatment plan reviewed  []   Home safety  []   Wheelchair mobility/management   []   Body mechanics  []   Bed Mobility/Transfer technique  []   Gait technique/sequencing  []   Proper use of assistive device/adaptive equipment  []   Stair training/Advanced mobility safety and technique  []   Reinforced patient's precautions/mobility while maintaining precautions  []   Postural awareness  []   Family/caregiver training  []   Progress was updated and reviewed in Rehabtracker with patient and/or family this date. [x]   Other: mechanics of respiratory system and benefits of PLB during physical exertion; role of joint mobility and muscle strength to reaction time required to correct LOB      Treatment Plan for Next Session: gait training with regular shoes on, continue mobility training in room air if SpO2 remains>/=90%, trial 2\" curb step with 2-person assist if needed     Assessment: This pt demonstrated a positive response to today's treatment as evidenced by receptiveness to education, tolerance to room air during physical exertion, and increased ambulation tolerance over level surface. The patient is making good progress toward established goals as evidenced by QI scores.      Treatment/Activity Tolerance:   [x] Tolerated treatment with no adverse effects    [] Patient limited by fatigue  [] Patient limited by pain   [] Patient limited by medical complications:    [] Adverse reaction to Tx:   [] Significant change in status    Safety:       []  bed alarm set    []  chair alarm set    []  Pt refused alarms                []  Telesitter activated      [x]  Gait belt used during tx session      [x]other: pt left in w/c under BALDERRAMA's care        Number of Minutes/Billable Intervention  Gait Training 15   Therapeutic Exercise 15   Neuro Re-Ed    Therapeutic Activity 30   Wheelchair Propulsion    Group    Other:    TOTAL 60     Social History  Social/Functional History  Lives With: Spouse Ramona Sow- in good health and can help PRN)  Type of Home: House  Home Layout: Multi-level (Tri-level 7 steps upstairs - however currently installing chair lift)  Home Access: Stairs to enter without rails  Entrance Stairs - Number of Steps: 2 ANNIE - Does have handle attached to the door of the home that she uses  Bathroom Shower/Tub: Tub/Shower unit  Bathroom Toilet: Standard  Bathroom Equipment: Shower chair, Grab bars in shower, 3-in-1 commode  Bathroom Accessibility: Walker accessible  Home Equipment: Cane, Rollator, Reacher, Walker, rolling  Has the patient had two or more falls in the past year or any fall with injury in the past year?: Yes (> 10 falls in the last year)  Receives Help From: Family  ADL Assistance: Independent  Homemaking Responsibilities: Yes  Meal Prep Responsibility: Secondary (Pt cooks 1-2 times a week.)  Laundry Responsibility: No ( completes.)  Cleaning Responsibility: No ( completes.)  Bill Paying/Finance Responsibility: No ( completes.)  Shopping Responsibility: Secondary (Pt states \"I go with him ~ 30% of the time\".  completes mostly.)  Dependent Care Responsibility: No  Health Care Management: Primary  Ambulation Assistance: Independent (Pt uses 2WW at night to go in and out of the bathroom, sometimes in the kitchen and states she uses the walker to help her get out of chairs.  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 Pt has cataracts. No pets at home but loves animals. Pt uses pill box for 1 pill at home. Spouse manages finances. Additional Comments: Pt sleeps in regular flat bed. Objective                                                                                    Goals:  (Update in navigator)   : Long Term Goals  Time Frame for Long Term Goals : 10 tx days:  Long Term Goal 1: Pt will complete bed mobility (scooting, rolling R/L, and sup<->sit) Ind.  Long Term Goal 2: Pt will complete OOB transfers using 2WW/rollator Mod Ind. Long Term Goal 3: Pt will ambulate 10 ft and 50 ft with turns over level  surface using 2WW Mod Ind. Long Term Goal 4: Pt will ambulate >/=150 ft over level surface and 10 ft of uneven surface using rollator with SBA-Sup. Long Term Goal 5: Pt will ascend/descend curb step (2\"/4\" step height) and 4-5 steps using railings with Min A. Additional Goals?: Yes  Long term goal 6: Pt will complete object retrieval from the floor using 2WW/rollator and reacher Mod Ind.:        Plan of Care                                                                              Times per week: 5 days per week for a minimum of 60 minutes/day plus group as appropriate for 60 minutes.   Treatment to include Current Treatment Recommendations: Strengthening, ROM, Balance training, Functional mobility training, Transfer training, Endurance training, Gait training, Stair training, Pain management, Home exercise program, Safety education & training, Patient/Caregiver education & training, Equipment evaluation, education, & procurement, Positioning, Therapeutic activities    Electronically signed by   Garret Vyas PT  1/25/2023, 2:32 PM

## 2023-01-25 NOTE — PROGRESS NOTES
Facility/Department: [facility] ARU   CLINICAL BEDSIDE SWALLOW EVALUATION    NAME: Ford Cheema  : 1950  MRN: [id number]    ADMISSION DATE: 2023  ADMITTING DIAGNOSIS: has AAA (abdominal aortic aneurysm); Essential hypertension; KERI (acute kidney injury) ([address] Fall at home, initial encounter; Inclusion body myositis; Generalized weakness; Gait disturbance; Oropharyngeal dysphagia; Acute kidney injury (KERI) with acute tubular necrosis (ATN) ([address] Hypokalemia; Closed fracture of one rib of left side; Irritable bowel syndrome with both constipation and diarrhea; Closed nondisplaced fracture of phalanx of toe; and Pancytopenia ([address] on their problem list.  ONSET DATE: 23    Recent Chest Xray/CT of Chest:       FINDINGS: 23   Chest x-ray: No pneumothorax or infiltrate is identified. There is a mild to   moderate left pleural effusion. An underlying lung contusion or mass is not   excluded. The heart size is within normal limits. Pelvic x-ray: No acute fracture or hip dislocation is identified. Degenerative changes of the lower lumbar spine are noted. Impression   Left pleural effusion. No fracture visualized. Date of Eval: 2023  Evaluating Therapist: JERAMIE Marcos    Current Diet level:  Current Diet : Soft and Bite-Sized with thins. Primary Complaint  Patient Complaint: I hate this diet--give me my regular food back. I just have to be careful or the food sticks but I always have a glass of water handy. Pain:  Pain Assessment  Pain Assessment: None - Denies Pain  Pain Level: 0      Reason for Referral  Ford Cheema was referred for a bedside swallow evaluation to assess the efficiency of her swallow function, identify signs and symptoms of aspiration and make recommendations regarding safe dietary consistencies, effective compensatory strategies, and safe eating environment.   Per PAS: Ford Cheema is a 67 y.o. female with a PMHx of hypertension, multiple falls, inclusion body myositis, who presented on 1/20 with Fall at home. Patient with c/o x2 falls at home. Patient and spouse reports progressive decline in ambulation and multiple falls especially over the past 2 years secondary to progression of inclusion body myositis. Reported involvement of truncal and somewhat pharyngeal muscles as well. Reports that she did not hit her head or her neck, but fell on top over her plantarflexed forefoot. Patient states that most recently she has fallen twice over the past 5 days. In her previous fall, she said that she hit the left side of her chest on the countertop. Patient does follow-up with a neurologist for inclusion body myositis but has not been able to tolerate prednisone or any other immunosuppressive medications. Patient was found to have left 10th rib fracture and acute BLE phalangeal fractures. LLL atelectasis 2/2 rib fx. Also being medically managed for KERI, hypokalemia, and dizziness. Patient lives with spouse and is IND with ADL's and ambulation in house (RW for community distances). Currently patient is min-modA with ADL's and Penny with RW for transfers and ambulation. COVID negative test noted on 1/23. Medical/Surgical History   Abdominal aortic aneurysm (AAA) >39 mm diameter    Hypertension    IBS (irritable bowel syndrome)      Radiology Findings  Impression 1. Acute fracture in the posterior left 10th rib with mild to moderate displacement and acute nondisplaced fracture of the posterior left 11th rib. The subtle deformities in the left 8th and 9th ribs may be from old injury. 2.  There is no hemothorax or pneumothorax. Pulmonary emphysema is present with areas of atelectasis/scarring in the lower lungs. Opacification of the inferior most left lower lobe could be part of the atelectasis versus is a mild pulmonary contusion. 3.  No evidence of laceration or focal hematoma at the spleen or liver.   No free air or free fluid in the upper abdomen. 4.  Small cysts are suggested within the liver and kidneys. MBSS 12/19/22  Juan Strange was seen for an outpatient MBSS due to her c/o dysphagia to bread and thin liquids. Relevant h/o Inclusion body myositis, HTN, AAA, IBS and Reflux. Pt specifically c/o globus sensation with PO intake as if food or liquid is \"just sitting\" in her throat. She denies any choking episodes. Pt reports lip weakness and numbness resulting in occasional anterior oral loss of liquids by spoon. In terms of nutrition, pt reports that she tires after 30 minutes of eating and cannot get through a meal stating \"I don't eat all that much. \"  She reports a 30# weight loss over one year and has weighed approximately 130# for the last six months. The oral phase of the swallow was [facility] with adequate bilabial closure for spoon, cup and straw presentations. Mildly prolonged mastication with normal clearance for all textures. Moderate pharyngoesophageal dysphagia with virtually absent epiglottic inversion and anterior laryngeal excursion and severely limited upper esophageal sphincter opening. Deficits resulted in trace penetration for thin liquids midway to the vocal folds and to the anterior commissure for thin residue. Also severe residue in the pyriforms for all textures, effortful clearance through the UES with backflow into the pharynx observed. Pt was able to clear small amounts of residue with 3-5 swallows. She demonstrated reduced sensitivity to residue and required cues to complete repeat swallows. Esophageal screen:  sweep not completed, however, noted solids remained in upper 1/3 of the esophagus after the swallow. Recommend:  Regular solids as tolerated/Thins liquids by cup. Aspiration and Reflux precautions.   May consider use of alternate route of nutrition to supplement oral intake due to c/o inability to consume full meals due to fatigue resulting from pharyngoesophageal dysphagia, h/o weight loss or monitor nutritional status/weight until needed. May consider use of oral nutritional supplement. Refer to GI for further assessment of esophagus. Past Medical History:  has a past medical history of Abdominal aortic aneurysm (AAA) >39 mm diameter ([address] Hypertension, and IBS (irritable bowel syndrome). Past Surgical History:  has a past surgical history that includes Tonsillectomy         Impression  Dysphagia Diagnosis: Concerns for esophageal stage dysphagia  Dysphagia Impression : Oral pharyngeal swallow WFL; esphageal swallow issues were observed with modifications recommended. Pt uses a modified chin tuck to assist with swallow with an effortul swallow and a liquid wash. Dysphagia Outcome Severity Scale: Level 6: Within functional limits/Modified independence     Treatment Plan  Requires SLP Intervention: No  Duration of Treatment: No ST  D/C Recommendations: To be determined  Referral To: Dietician (pt requesting Ensure)    Recommended Diet and Intervention  Regular with thins. Meds whole as tolerated. Recommended Form of Meds: Whole with water  Recommendations: Other (comments) (Modification strategies were provided)       Compensatory Swallowing Strategies  Compensatory Swallowing Strategies : Alternate solids and liquids;Effortful swallow;Small bites/sips;Eat/Feed slowly; Remain upright for 30-45 minutes after meals    Treatment/Goals  Short-term Goals  Goal 1: No ST    General  Chart Reviewed: Yes  Behavior/Cognition: Alert; Cooperative;Pleasant mood  Respiratory Status: O2 via nasual cannula  Communication Observation: Functional  Follows Directions: Complex  Dentition: Adequate  Patient Positioning: Upright in chair  Baseline Vocal Quality: Normal  Volitional Cough: Strong  Volitional Swallow: Absent  Prior Dysphagia History: Hx of dysphagia per MBS 12/2022 and has had esophagus stretched  Consistencies Administered: Regular;Easy to chew;Pureed;Thin;Thin - cup           Vision/Hearing  Vision  Vision: Impaired  Vision Exceptions: Cataracts; Wears glasses at all times  Hearing  Hearing: Within functional limits    Oral Motor Deficits  Oral/Motor  Oral Hygiene: Moist  Gag: Reduced    Oral Phase Dysfunction  Oral Phase  Oral Phase: WFL     Indicators of Pharyngeal Phase Dysfunction   Pharyngeal Phase   Pharyngeal Phase: Effortful swallow. Esophageal phase impacted with food sticking for softs and solids requiring a liquid wash. Pt does not feel like this is worse than usual PTA and has strategies she uses to compensate. Pt clears bolus with liquid wash or taking extra time between bites.     Prognosis  Individuals consulted  Consulted and agree with results and recommendations: Patient    Education  Patient Education: Results and recommendations; rehab expectations  Patient Education Response: Verbalizes understanding             Therapy Time  SLP Individual Minutes  Time In: 0320  Time Out: 8087  Minutes: [address] [place], [place], [id number] [place] [address] Road  1/25/2023 5:52 PM

## 2023-01-25 NOTE — PROGRESS NOTES
Physical Rehabilitation: OCCUPATIONAL THERAPY     [x] daily progress note       [] discharge       Patient Name:  Sadie Rubin   :  1950 MRN: [id number]  Room:  [address] [place] [address] Date of Admission: 2023  Rehabilitation Diagnosis:   Inclusion body myositis (IBM) [G72.41]  Inclusion body myositis [G72.41]       Date 2023       Day of ARU Week:  3   Time IN//1000; 1410/1430   Individual Tx Minutes 70 + 20   Group Tx Minutes    Co-Treat Minutes    Concurrent Tx Minutes    TOTAL Tx Time Mins 90   Variance Time    Variance Time []   Refusal due to:     []   Medical hold/reason:    []   Illness   []   Off Unit for test/procedure  []   Extra time needed to complete task  []   Therapeutic need  []   Other (specify):   Restrictions Restrictions/Precautions: Fall Risk, General Precautions, Weight Bearing         Communication with other providers: [x]   OK to see per nursing:     []   Spoke with team member regarding:      Subjective observations and cognitive status: Patient sitting in high fowlers upon approach, pleasant and agreeable to therapy      Pain level/location:    /10       Location: paon being managed well    Discharge recommendations  Anticipated discharge date:  TBD  Destination: []home alone   []home alone w assist prn   [x] home w/ family    [] Continuous supervision       []SNF    [] Assisted living     [] Other:   Continued therapy: [x]HHC OT  []OUTPATIENT  OT   [] No Further OT  Equipment needs: None        ADLs:    Eating: Eating  Assistance Needed: Independent  Comment: X  CARE Score: 6  Discharge Goal: Set-up or clean-up assistance       Oral Hygiene: Oral Hygiene  Assistance Needed: Independent  Comment: MOd I seated sinkside  CARE Score: 6  Discharge Goal: Independent    UB/LB Bathing: Shower/Bathe Self  Assistance Needed: Supervision or touching assistance  Comment: SBA seated entirety of shower, Anxiety and fear of falling patient declines to stand  CARE Score: 4  Discharge Goal: Independent    UB Dressing: Upper Body Dressing  Assistance Needed: Setup or clean-up assistance  Comment: Set up  CARE Score: 5  Discharge Goal: Independent         LB Dressing: Lower Body Dressing  Assistance Needed: Supervision or touching assistance  Comment: CGA to steady in stance, threads BLE into pants using forward flexion and figure four method  CARE Score: 4  Discharge Goal: Independent  PM session: Patient requests changing pants to more comfortable pair of pants, patient completes at [employer] and [place] [employer]: Putting On/Taking Off Footwear  Assistance Needed: Setup or clean-up assistance  Comment: Pm session:Patient doffs orthopedic shoes and hospital socks and donns regular socks and tennis shoes with set up assist  CARE Score: 5  Discharge Goal: Independent    PM SESSION: Patient doffs orthopedic shoes and hospital socks and donns regular socks and tennis shoes with set up assist sitting on recliner with lower surface     Toiletin [address] needed: Supervision or touching assistance  Comment: CGA in stance to manage over hips, completes hygiene seated  CARE Score: 4  Discharge Goal: Independent    PM SESSION: patient completes with SBA    Toilet Transfers:     Toilet Transfer  Assistance needed: Supervision or touching assistance  Comment: CGA heavy use of grab bars  CARE Score: 4  Discharge Goal: Independent  Device Used:    []   Standard Toilet         [x]   Grab Bars           []  Bedside Commode frame over standard toilet        []   Elevated Toilet          []   Other:    PM session: CGA with cues for body placement c directional change using 4WW       Bed Mobility:           []   Pt received out of bed   Rolling R/L:    Scooting:  Sup to EOB  Supine --> Sit:  Sup  Sit --> Supine:      Transfers:    Sit--> Stand:  CGA  Stand --> Sit:   CGA  Stand-Pivot:   CGA  Other:    Assistive device required for transfer:   RW      Functional Mobility:  throughout room/bathroom   Assistance:  CGA  Device:   [x]   Rolling Walker     []   Standard Walker []   Wheelchair        []   [employer]       []   4-Wheeled [employer]         []   Cardiac Walker       []   Other:            Additional Therapeutic activities/exercises completed this date:     [x]   ADL Training   [x]   Balance/Postural training     [x]   Bed/Transfer Training   [x]   Endurance Training   []   Neuromuscular Re-ed   []   Nu-step:  Time:        Level:         #Steps:       []   Rebounder:    []  Seated     []  Standing        []   Supine Ther Ex (reps/sets):     []   Seated Ther Ex (reps/sets):     []   Standing Ther Ex (reps/sets):     []   Other:      Comments:      Patient/Caregiver Education and Training:   [x]   [employer] Equipment Use  [x]   Bed Mobility/Transfer Technique/Safety  [x]   Energy Conservation Tips  []   Family training  [x]   Postural Awareness  [x]   Safety During Functional Activities  []   Reinforced Patient's Precautions   []   Progress was updated and reviewed in Rehabtracker with patient and/or family this         date.     Treatment Plan for Next Session: POC to continue as tolerated         Treatment/Activity Tolerance:   [x] Tolerated treatment with no adverse effects    [] Patient limited by fatigue  [] Patient limited by pain   [] Patient limited by medical complications:    [] Adverse reaction to Tx:   [] Significant change in status    Safety:       []  bed alarm set    [x]  chair alarm set    []  Pt refused alarms                []  Telesitter activated      [x]  Gait belt used during tx session      []other:       Number of Minutes/Billable Intervention  Therapeutic Exercise    ADL Self-care 55 + 20   Neuro Re-Ed    Therapeutic Activity 15   Group    Other:    TOTAL 90       Social History  Social/Functional History  Lives With: Spouse Ritu Serrano- in good health and can help PRN)  Type of Home: House  Home Layout: Multi-level (Tri-level 7 steps upstairs - however currently installing chair lift)  Home Access: Stairs to enter without rails  Entrance Stairs - Number of Steps: 2 ANNIE - Does have handle attached to the door of the home that she uses  Bathroom Shower/Tub: Tub/Shower unit  Bathroom Toilet: Standard  Bathroom Equipment: Shower chair, Grab bars in shower, 3-in-1 commode  Bathroom Accessibility: Walker accessible  Home Equipment: Cane, Rollator, Reacher, Walker, rolling  Has the patient had two or more falls in the past year or any fall with injury in the past year?: Yes (> 10 falls in the last year)  Receives Help From: Family  ADL Assistance: Independent  Homemaking Responsibilities: Yes  Meal Prep Responsibility: Secondary (Pt cooks 1-2 times a week.)  Laundry Responsibility: No ( completes.)  Cleaning Responsibility: No ( completes.)  Bill Paying/Finance Responsibility: No ( completes.)  Shopping Responsibility: Secondary (Pt states \"I go with him ~ 30% of the time\".  completes mostly.)  Dependent Care Responsibility: No  Health Care Management: Primary  Ambulation Assistance: Independent (Pt uses 2WW at night to go in and out of the bathroom, sometimes in the kitchen and states she uses the walker to help her get out of chairs. Pt uses rollator in the community.)  Transfer Assistance: Needs assistance (Pt requires assist with shower transfers and sometimes transfers out of a chair.)  Active : No  Mode of Transportation: [employer], Family  Education: HS education  Occupation: Retired  Type of Occupation: [employer]-  Leisure & Hobbies: Pt enjoys paint by numbers, cooking and reading. Pt has cataracts. No pets at home but loves animals. Pt uses pill box for 1 pill at home. Spouse manages finances. Additional Comments: Pt sleeps in regular flat bed.     Objective                                                                                    Goals:  (Update in navigator)  Short Term Goals  Time Frame for Short Term Goals: STGs=LTGs:  Long Term Goals  Time Frame for Long Term Goals : 10-12 days or until d/c. Long Term Goal 1: Pt will complete oral hygiene and grooming tasks with IND. Long Term Goal 2: Pt will complete total body bathing with AE PRN and Mod I.  Long Term Goal 3: Pt will complete UB dressing with IND. Long Term Goal 4: Pt will complete LB dressing with AE PRN and Mod I.  Long Term Goal 5: Pt will doff/don footwear with AE PRN and Mod I. Additional Goals?: Yes  Long Term Goal 6: Pt will complete toileting with IND. Long Term Goal 7: Pt will complete functional transfers (bed, chair, toilet, shower) with DME PRN and Mod I.  Long Term Goal 8: Pt will perform therex/therax to facilitate an increase in strength/endurance with emphasis on dynamic standing balance/tolerance > 8 mins with Mod I.  Long Term Goal 9: Pt will perform meal prep tasks with Mod I.:        Plan of Care                                                                              Times per week: 5 days per week for a minimum of 60 minutes/day plus group as appropriate for 60 minutes.   Treatment to include Occupational Therapy Plan  Current Treatment Recommendations: Strengthening, ROM, Endurance training, Functional mobility training, Safety education & training, Patient/Caregiver education & training, Pain management, Equipment evaluation, education, & procurement, Positioning, Self-Care / ADL, Home management training, Balance training, Coordination training    Electronically signed by   ARNULFO Milligan,  1/25/2023, 2:35 PM

## 2023-01-25 NOTE — PATIENT CARE CONFERENCE
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]  Patient Comorbid Conditions: Active Hospital Problems    Diagnosis Date Noted    Generalized weakness [R53.1] 2023     Priority: Medium    Gait disturbance [R26.9] 2023     Priority: Medium    Oropharyngeal dysphagia [R13.12] 2023     Priority: Medium    Acute kidney injury (KERI) with acute tubular necrosis (ATN) ([address] [N17.0] 2023     Priority: Medium    Hypokalemia [E87.6] 2023     Priority: Medium    Closed fracture of one rib of left side [S22.32XA] 2023     Priority: Medium    Irritable bowel syndrome with both constipation and diarrhea [K58.2] 2023     Priority: Medium    Closed nondisplaced fracture of phalanx of toe [S92.919A] 2023     Priority: Medium    Pancytopenia ([address] [D61.818] 2023     Priority: Medium    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 2 ANNIE. Interior stair lift ordered. SC/SPC/RW/4WW PTA. No o2 PTA    PHYSICAL THERAPY (Updated in QI)        Long Term Goals  Time Frame for Long Term Goals : 10 tx days:  Long Term Goal 1: Pt will complete bed mobility (scooting, rolling R/L, and sup<->sit) Ind.  Long Term Goal 2: Pt will complete OOB transfers using 2WW/rollator Mod Ind. Long Term Goal 3: Pt will ambulate 10 ft and 50 ft with turns over level  surface using 2WW Mod Ind. Long Term Goal 4: Pt will ambulate >/=150 ft over level surface and 10 ft of uneven surface using rollator with SBA-Sup. Long Term Goal 5: Pt will ascend/descend curb step (2\"/4\" step height) and 4-5 steps using railings with Min A.   Additional Goals?: Yes  Long term goal 6: Pt will complete object retrieval from the floor using 2WW/rollator and reacher Mod Ind. Impairments/deficits, barriers:     Body Structures, Functions, Activity Limitations Requiring Skilled Therapeutic Intervention: Decreased functional mobility , Decreased ADL status, Decreased ROM, Decreased strength, Decreased endurance, Decreased sensation, Decreased balance, Increased pain, Decreased high-level IADLs     Therapy Prognosis: Good, Guarded  Decision Making: High Complexity  Clinical Presentation: unstable/unpredictable characteristics  Equipment needed at discharge: has 2WW and rollator       PT IRF-SNOW scores since initial assessment  Bed Mobility:   Roll Left and Right  Comment: pt declined to attempt rolling to R due to anticipated pain on L ribcage; pt attempted rolling to L and was unable to fully complete due to pain on L ribcage  Reason if not Attempted: Not attempted due to medical condition or safety concerns  CARE Score: 88  Discharge Goal: Independent    Sit to Lying  Assistance Needed: Supervision or touching assistance  Comment: SBA without use of bed features  CARE Score: 4  Discharge Goal: Independent    Lying to Sitting on Side of Bed  Assistance Needed: Supervision or touching assistance  Comment: SBA without use of bed features  CARE Score: 4  Discharge Goal: Independent    Transfers:    Sit to Stand  Assistance Needed: Supervision or touching assistance  Comment: SBA using rollator from recliner  CARE Score: 4  Discharge Goal: Independent    Chair/Bed-to-Chair Transfer  Assistance Needed: Supervision or touching assistance  Comment: SBA-Sup using rollator  CARE Score: 4  Discharge Goal: Independent    Toilet Transfer  Assistance needed: Supervision or touching assistance  Comment: CGA using 2WW and BSC frame over toilet with additional assist on O2 line management  CARE Score: 4    Car Transfer  Assistance Needed: Supervision or touching assistance  Comment: CGA using rollator with additional assist on O2 line management  CARE Score: 4  Discharge Goal: Independent    Ambulation:    Walking Ability  Does the Patient Walk?: Yes     Walk 10 Feet  Assistance Needed: Supervision or touching assistance  Comment: CGA using rollator and post-op shoes  CARE Score: 4  Discharge Goal: Independent     Walk 50 Feet with Two Turns  Assistance Needed: Supervision or touching assistance  Comment: CGA using rollator and post-op shoes  CARE Score: 4  Discharge Goal: Independent     Walk 150 Feet  Assistance Needed: Supervision or touching assistance  Comment: CGA using rollator and post-op shoes; reports increased pain on lateral aspect of R foot but was not antalgic; pt was able to ambulate up to 296 ft and SpO2 was 87% after maximal exertion; SpO2 gradually increased to 90-94% during sitting break and with PLB  CARE Score: 4  Discharge Goal: Supervision or touching assistance     Walking 10 Feet on Uneven Surfaces  Assistance Needed: Supervision or touching assistance  Comment: CGA using rollator with additional assist on O2 line/tank management  CARE Score: 4  Discharge Goal: Supervision or touching assistance     1 Step (Curb)  Comment: pt refused; expressed fear regardless of step height (2\"/4\") and even with 2-person assist  Reason if not Attempted: Patient refused  CARE Score: 7  Discharge Goal: Partial/moderate assistance     4 Steps  Comment: safety concerns related to impaired strength and endurance and increased anxiety/fear  Reason if not Attempted: Not attempted due to medical condition or safety concerns  CARE Score: 88  Discharge Goal: Partial/moderate assistance     12 Steps  Comment: pt does not demonstrate potential to progressing to this mobility task due to progressive weakness from underlying IBM  Reason if not Attempted: Not attempted due to medical condition or safety concerns  CARE Score: 88  Discharge Goal: Not Attempted           Wheelchair:  w/c Ability: Wheelchair Ability  Uses a Wheelchair and/or Scooter?: No                        Balance:        Object: Picking Up Object  Assistance Needed: Partial/moderate assistance  Comment: Min A using rollator and reacher; required stabilization of AD as pt performed required task as pt did not manage hand brakes; pt used R hand to manipulate reacher due to chronic L hand limitation  CARE Score: 3  Discharge Goal: Independent    Fall Risk: []  Yes  []  No    OCCUPATIONAL THERAPY  (Updated in QI)  Short Term Goals  Time Frame for Short Term Goals: STGs=LTGs :   Long Term Goals  Time Frame for Long Term Goals : 10-12 days or until d/c. Long Term Goal 1: Pt will complete oral hygiene and grooming tasks with IND. Long Term Goal 2: Pt will complete total body bathing with AE PRN and Mod I.  Long Term Goal 3: Pt will complete UB dressing with IND. Long Term Goal 4: Pt will complete LB dressing with AE PRN and Mod I.  Long Term Goal 5: Pt will doff/don footwear with AE PRN and Mod I. Additional Goals?: Yes  Long Term Goal 6: Pt will complete toileting with IND. Long Term Goal 7: Pt will complete functional transfers (bed, chair, toilet, shower) with DME PRN and Mod I.  Long Term Goal 8: Pt will perform therex/therax to facilitate an increase in strength/endurance with emphasis on dynamic standing balance/tolerance > 8 mins with Mod I.  Long Term Goal 9: Pt will perform meal prep tasks with Mod I. :          OT IRF-SNOW scores and goals since initial assessment:    ADLs:    Eating: Eating  Assistance Needed: Independent  Comment: Mod I using compensatory rtechniques to open packages  CARE Score: 6  Discharge Goal: Set-up or clean-up assistance       Oral Hygiene: Oral Hygiene  Assistance Needed: Independent  Comment: Mod I seated sinkside  CARE Score: 6  Discharge Goal: Independent    Toiletin [address] needed: Supervision or touching assistance  Comment: SBA to manage clothing, seated to complete hygiene  CARE Score: 4  Discharge Goal: Independent      UB/LB Bathing: Shower/Bathe Self  Assistance Needed: Supervision or touching assistance  Comment: Sup/SBA in stance, seated majority of wash up  CARE Score: 4  Discharge Goal: Independent    UB Dressing: Upper Body Dressing  Assistance Needed: Independent  Comment: X  CARE Score: 6  Discharge Goal: Independent         LB Dressing: Lower Body Dressing  Assistance Needed: Supervision or touching assistance  Comment: Sup/SBA in stance, uses figure four and forward flexion method to thread BLE into pants  CARE Score: 4  Discharge Goal: Independent    Donning and [place] Footwear: Putting On/Taking Off Footwear  Assistance Needed: Independent  Comment: uses figure four to leah socks, forward flexion and uses 4WW to prop up feet  to leah tennis shoes  CARE Score: 6  Discharge Goal: Independent      Toilet Transfers: Toilet Transfer  Assistance needed: Supervision or touching assistance  Comment: SBA  CARE Score: 4  Discharge Goal: Independent      Impairments/deficits, barriers:  Anxiety/fear, functional balance, strength, FMC, endurance, and ADLs. Assessment  Performance deficits / Impairments: Decreased functional mobility , Decreased strength, Decreased endurance, Decreased vision/visual deficit, Decreased ADL status, Decreased coordination, Decreased safe awareness, Decreased sensation, Decreased high-level IADLs, Decreased posture, Decreased ROM, Decreased balance, Decreased fine motor control  Decision Making: Medium Complexity  Equipment needed at discharge:None      COGNITIVE FUNCTION/SPEECH THERAPY (AS INDICATED)  LTG         Duration/Frequency of Treatment  Duration of Treatment: No ST  Short-term Goals  Goal 1: No ST                  Short Term Goals  Time Frame for Short Term Goals: No ST   Swallow eval completed with education for safety on regular diet.                     Nursing Current Medical Status:   [x] Is continent of bowel and bladder     [] Is incontinent of bowel and bladder    [] Has had an adequate number of bowel movements   [x] Urinates with no urinary retention >300ml in bladder   [] Targeting bladder protocol with lewis removal   [x] Maintaining O2 SATs at 92% or greater - 2L O2 at night   [x] Has pain managed while on ARU         [x] Has had no skin breakdown while on ARU   [] Has improved skin integrity via wound measurements   [] Has no signs/symptoms of infection at the wound site   [] Pressure wounds Stage/Location:    [] Arrived on unit with pressure wound  [x] Has been free from injury during hospitalization   [] Has experienced a fall during hospitalization  [] Ongoing education with patient/family with understanding demonstrated for:  [] Receives IV Fluids  [] Other:        NUTRITION  Weight: 135 lb 1.6 oz (61.3 kg) / Body mass index is 23.19 kg/m². Current diet: ADULT DIET; Regular  Intake: Improving intake, recent meals %       Medical improvements/barriers: diet advanced with strategies, O2 SATS maintained without O2 during the day but desats at night, Lots of anxiety with stairs, assist with ADL's        Team goals for next treatment period/Intervention for current barriers:   [] Pt will increase activity tolerance for daily tasks. [x] Pt will improve bed mobility with reduced assist.  [x] Pt will improve safety in fx tasks with reduced cues/assist  [x] Pt will improve transfers with reduced assist  [] Pt will improve toileting with reduced assist  [x] Pt will improve ADL's with use of adaptive equipment with reduced assist  [] Pt will improve pain mgmt for maximum participation in tx program  [] Pt will improve communication to get basic needs met on unit  [x] Pt will improve swallowing for safe diet advancement with use of strategies  []  Plan for discharge to home.      Patient Strengths: Motivated, Cooperative, and Pleasant    Justification for Continued Stay  Based on my medical assessment of the patient and review of information from the interdisciplinary team as part of this weekly team conference, the patient continues to meet the following criteria for IRF level of care: The patient requires active and ongoing intervention of multiple therapy disciplines  The patient requires and intensive rehabilitation therapy program  The patient requires continued physician supervision by a rehabilitation physician  The patient requires 24 hours rehab nursing care  The patient requires an intensive and coordinated interdisciplinary team approach to the delivery of rehabilitative care. Assessment/Plan   [x]  The patient is making good progression towards their long term goals and is actively participating in and has a reasonable expectation to continue to benefit from the intensive rehabilitation therapy program   []  The estimated discharge date has been changed from initial team conference due to:   []  The estimated discharge destination has been changed from initial team conference due to:         Ongoing tx following discharge: [x]C OT, PT    []OUTPATIENT     [] No Further Treatment     [] Family/Caregiver Training  []  Transitional Living Arrangement   [] Home Assessment (date  )     [] Family Conference   []  Therapeutic Pass       []  Other: (specify)    Estimated Discharge Date: 2/1/23    Estimated Discharge Destination: []home alone   []home alone with assist prn  []Continuous supervision [x]Return home with s/o/spouse/family   [] Assisted living    []SNF     Team members participating in today's conference.     [x] Zachery Antonio, Medical Director  [x] GARY IrbyT,       [] Ermelinda Hernandez, RN Nurse Manager     [x]  Luis Jenkins, PT  []  Minal Ring, BARAK       [] Maria Luisa Domínguez, OT   [x] Nick Neri, OT  [] [place], OT     [x]  Davion Escoto, SLP    []  Cordell Card, SLP   [] Francisco Javier Bowman, SLP      [x]Gloria Candelario,   []Clarissa Robles MSW, LSW,      [x] Oliver Mendez, RN   [] Jayesh Hillman RN    [] Tucker Ross RN    [] Melody Antonio RN [] Holly Harris RN       I have led this Team Conference and agree with the plan, Josephine Hernandes MD, 1/26/2023, 12:40 PM  Goals have been updated to reflect recent status.     Team conference note transcribed this date by: Anders Goldsmith MA, [id number] [facility], Therapy Coordinator

## 2023-01-25 NOTE — H&P
The history and physical for this admission/encounter was completed within the first 24 hours of the patient's admission.  The note was mislabeled as a progress note dated 1/23/2023 at 11:03 PM.

## 2023-01-26 LAB
ANION GAP SERPL CALCULATED.3IONS-SCNC: 6 MMOL/L (ref 4–16)
BUN BLDV-MCNC: 18 MG/DL (ref 6–23)
CALCIUM SERPL-MCNC: 9.8 MG/DL (ref 8.3–10.6)
CHLORIDE BLD-SCNC: 99 MMOL/L (ref 99–110)
CO2: 29 MMOL/L (ref 21–32)
CREAT SERPL-MCNC: 0.7 MG/DL (ref 0.6–1.1)
GFR SERPL CREATININE-BSD FRML MDRD: >60 ML/MIN/1.73M2
GLUCOSE BLD-MCNC: 112 MG/DL (ref 70–99)
HCT VFR BLD CALC: 33.1 % (ref 37–47)
HEMOGLOBIN: 11 GM/DL (ref 12.5–16)
MCH RBC QN AUTO: 31.3 PG (ref 27–31)
MCHC RBC AUTO-ENTMCNC: 33.2 % (ref 32–36)
MCV RBC AUTO: 94.3 FL (ref 78–100)
PDW BLD-RTO: 12.8 % (ref 11.7–14.9)
PLATELET # BLD: 193 K/CU MM (ref 140–440)
PMV BLD AUTO: 9.1 FL (ref 7.5–11.1)
POTASSIUM SERPL-SCNC: 3.9 MMOL/L (ref 3.5–5.1)
RBC # BLD: 3.51 M/CU MM (ref 4.2–5.4)
SODIUM BLD-SCNC: 134 MMOL/L (ref 135–145)
WBC # BLD: 3.6 K/CU MM (ref 4–10.5)

## 2023-01-26 PROCEDURE — 6370000000 HC RX 637 (ALT 250 FOR IP): Performed by: STUDENT IN AN ORGANIZED HEALTH CARE EDUCATION/TRAINING PROGRAM

## 2023-01-26 PROCEDURE — 99233 SBSQ HOSP IP/OBS HIGH 50: CPT | Performed by: PHYSICAL MEDICINE & REHABILITATION

## 2023-01-26 PROCEDURE — 85027 COMPLETE CBC AUTOMATED: CPT

## 2023-01-26 PROCEDURE — 94150 VITAL CAPACITY TEST: CPT

## 2023-01-26 PROCEDURE — 97116 GAIT TRAINING THERAPY: CPT

## 2023-01-26 PROCEDURE — 6370000000 HC RX 637 (ALT 250 FOR IP): Performed by: PHYSICAL MEDICINE & REHABILITATION

## 2023-01-26 PROCEDURE — 2700000000 HC OXYGEN THERAPY PER DAY

## 2023-01-26 PROCEDURE — 6360000002 HC RX W HCPCS: Performed by: STUDENT IN AN ORGANIZED HEALTH CARE EDUCATION/TRAINING PROGRAM

## 2023-01-26 PROCEDURE — 97530 THERAPEUTIC ACTIVITIES: CPT

## 2023-01-26 PROCEDURE — 80048 BASIC METABOLIC PNL TOTAL CA: CPT

## 2023-01-26 PROCEDURE — 94664 DEMO&/EVAL PT USE INHALER: CPT

## 2023-01-26 PROCEDURE — 94761 N-INVAS EAR/PLS OXIMETRY MLT: CPT

## 2023-01-26 PROCEDURE — 97110 THERAPEUTIC EXERCISES: CPT

## 2023-01-26 PROCEDURE — 36415 COLL VENOUS BLD VENIPUNCTURE: CPT

## 2023-01-26 PROCEDURE — 1280000000 HC REHAB R&B

## 2023-01-26 PROCEDURE — 97535 SELF CARE MNGMENT TRAINING: CPT

## 2023-01-26 RX ADMIN — ACETAMINOPHEN 650 MG: 325 TABLET ORAL at 08:24

## 2023-01-26 RX ADMIN — PANTOPRAZOLE SODIUM 40 MG: 40 TABLET, DELAYED RELEASE ORAL at 05:19

## 2023-01-26 RX ADMIN — ENOXAPARIN SODIUM 40 MG: 100 INJECTION SUBCUTANEOUS at 20:25

## 2023-01-26 RX ADMIN — OXYCODONE HYDROCHLORIDE 10 MG: 10 TABLET ORAL at 22:09

## 2023-01-26 RX ADMIN — SENNOSIDES 8.6 MG: 8.6 TABLET, COATED ORAL at 20:24

## 2023-01-26 RX ADMIN — ACETAMINOPHEN 650 MG: 325 TABLET ORAL at 12:18

## 2023-01-26 RX ADMIN — OXYCODONE HYDROCHLORIDE 5 MG: 5 TABLET ORAL at 17:11

## 2023-01-26 RX ADMIN — Medication 3 MG: at 20:24

## 2023-01-26 RX ADMIN — DOCUSATE SODIUM 100 MG: 100 CAPSULE, LIQUID FILLED ORAL at 08:24

## 2023-01-26 RX ADMIN — ACETAMINOPHEN 650 MG: 325 TABLET ORAL at 17:11

## 2023-01-26 RX ADMIN — POLYETHYLENE GLYCOL (3350) 17 G: 17 POWDER, FOR SOLUTION ORAL at 05:40

## 2023-01-26 RX ADMIN — OXYCODONE HYDROCHLORIDE 5 MG: 5 TABLET ORAL at 05:19

## 2023-01-26 RX ADMIN — AMLODIPINE BESYLATE 5 MG: 5 TABLET ORAL at 08:24

## 2023-01-26 RX ADMIN — ACETAMINOPHEN 650 MG: 325 TABLET ORAL at 20:24

## 2023-01-26 ASSESSMENT — PAIN DESCRIPTION - DESCRIPTORS
DESCRIPTORS: ACHING
DESCRIPTORS: DISCOMFORT

## 2023-01-26 ASSESSMENT — PAIN SCALES - GENERAL
PAINLEVEL_OUTOF10: 4
PAINLEVEL_OUTOF10: 4
PAINLEVEL_OUTOF10: 6
PAINLEVEL_OUTOF10: 0
PAINLEVEL_OUTOF10: 7
PAINLEVEL_OUTOF10: 0
PAINLEVEL_OUTOF10: 2
PAINLEVEL_OUTOF10: 0

## 2023-01-26 ASSESSMENT — PAIN DESCRIPTION - LOCATION
LOCATION: RIB CAGE
LOCATION: FOOT;CHEST
LOCATION: RIB CAGE
LOCATION: RIB CAGE

## 2023-01-26 ASSESSMENT — PAIN DESCRIPTION - ORIENTATION
ORIENTATION: LEFT
ORIENTATION: LEFT;RIGHT
ORIENTATION: LEFT
ORIENTATION: LEFT

## 2023-01-26 NOTE — PROGRESS NOTES
Sacha Gooden    : 1950  Acct #: [de-identified]  MRN: [id number]              PM&R Progress Note      Admitting diagnosis: ***    Comorbid diagnoses impacting rehabilitation: ***    Chief complaint: ***    Prior (baseline) level of function: Independent. Current level of function:         Current  IRF-SNOW and Goals:   Occupational Therapy:    Short Term Goals  Time Frame for Short Term Goals: STGs=LTGs :   Long Term Goals  Time Frame for Long Term Goals : 10-12 days or until d/c. Long Term Goal 1: Pt will complete oral hygiene and grooming tasks with IND. Long Term Goal 2: Pt will complete total body bathing with AE PRN and Mod I.  Long Term Goal 3: Pt will complete UB dressing with IND. Long Term Goal 4: Pt will complete LB dressing with AE PRN and Mod I.  Long Term Goal 5: Pt will doff/don footwear with AE PRN and Mod I. Additional Goals?: Yes  Long Term Goal 6: Pt will complete toileting with IND.   Long Term Goal 7: Pt will complete functional transfers (bed, chair, toilet, shower) with DME PRN and Mod I.  Long Term Goal 8: Pt will perform therex/therax to facilitate an increase in strength/endurance with emphasis on dynamic standing balance/tolerance > 8 mins with Mod I.  Long Term Goal 9: Pt will perform meal prep tasks with Mod I. :                                       Eating: Eating  Assistance Needed: Independent  Comment: X  CARE Score: 6  Discharge Goal: Set-up or clean-up assistance       Oral Hygiene: Oral Hygiene  Assistance Needed: Independent  Comment: MOd I seated sinkside  CARE Score: 6  Discharge Goal: Independent    UB/LB Bathing: Shower/Bathe Self  Assistance Needed: Supervision or touching assistance  Comment: SBA seated entirety of shower, Anxiety and fear of falling patient declines to stand  CARE Score: 4  Discharge Goal: Independent    UB Dressing: Upper Body Dressing  Assistance Needed: Setup or clean-up assistance  Comment: Set up  CARE Score: 5  Discharge Goal: Independent         LB Dressing: Lower Body Dressing  Assistance Needed: Supervision or touching assistance  Comment: CGA to steady in stance, threads BLE into pants using forward flexion and figure four method  CARE Score: 4  Discharge Goal: Independent    Donning and [place] Footwear: Putting On/Taking Off Footwear  Assistance Needed: Setup or clean-up assistance  Comment: Pm session:Patient doffs orthopedic shoes and hospital socks and donns regular socks and tennis shoes with set up assist  CARE Score: 5  Discharge Goal: Independent      Toiletin [address] needed: Supervision or touching assistance  Comment: CGA in stance to manage over hips, completes hygiene seated  CARE Score: 4  Discharge Goal: Independent      Toilet Transfers: Toilet Transfer  Assistance needed: Supervision or touching assistance  Comment: CGA heavy use of grab bars  CARE Score: 4  Discharge Goal: Independent    Physical Therapy:         Long Term Goals  Time Frame for Long Term Goals : 10 tx days:  Long Term Goal 1: Pt will complete bed mobility (scooting, rolling R/L, and sup<->sit) Ind.  Long Term Goal 2: Pt will complete OOB transfers using 2WW/rollator Mod Ind. Long Term Goal 3: Pt will ambulate 10 ft and 50 ft with turns over level  surface using 2WW Mod Ind. Long Term Goal 4: Pt will ambulate >/=150 ft over level surface and 10 ft of uneven surface using rollator with SBA-Sup. Long Term Goal 5: Pt will ascend/descend curb step (2\"/4\" step height) and 4-5 steps using railings with Min A. Additional Goals?: Yes  Long term goal 6: Pt will complete object retrieval from the floor using 2WW/rollator and reacher Mod Ind.       Bed Mobility:   Sit to Lying  Assistance Needed: Supervision or touching assistance  Comment: SBA without use of bed features  CARE Score: 4  Discharge Goal: Independent  Roll Left and Right  Comment: pt declined to attempt rolling to R due to anticipated pain on L ribcage; pt attempted rolling to L and was unable to fully complete due to pain on L ribcage  Reason if not Attempted: Not attempted due to medical condition or safety concerns  CARE Score: 88  Discharge Goal: Independent  Lying to Sitting on Side of Bed  Assistance Needed: Supervision or touching assistance  Comment: SBA without use of bed features  CARE Score: 4  Discharge Goal: Independent    Transfers:    Sit to Stand  Assistance Needed: Supervision or touching assistance  Comment: SBA using rollator from recliner  CARE Score: 4  Discharge Goal: Independent  Chair/Bed-to-Chair Transfer  Assistance Needed: Supervision or touching assistance  Comment: SBA-Sup using rollator  CARE Score: 4  Discharge Goal: Independent  Toilet Transfer  Assistance needed: Supervision or touching assistance  Comment: CGA using 2WW and BSC frame over toilet with additional assist on O2 line management  CARE Score: 4  Car Transfer  Assistance Needed: Supervision or touching assistance  Comment: CGA using rollator with additional assist on O2 line management  CARE Score: 4  Discharge Goal: Independent    Ambulation:    Walking Ability  Does the Patient Walk?: Yes     Walk 10 Feet  Assistance Needed: Supervision or touching assistance  Comment: CGA using rollator and post-op shoes  CARE Score: 4  Discharge Goal: Independent     Walk 50 Feet with Two Turns  Assistance Needed: Supervision or touching assistance  Comment: CGA using rollator and post-op shoes  CARE Score: 4  Discharge Goal: Independent     Walk 150 Feet  Assistance Needed: Supervision or touching assistance  Comment: CGA using rollator and post-op shoes; reports increased pain on lateral aspect of R foot but was not antalgic; pt was able to ambulate up to 296 ft and SpO2 was 87% after maximal exertion; SpO2 gradually increased to 90-94% during sitting break and with PLB  CARE Score: 4  Discharge Goal: Supervision or touching assistance     Walking 10 Feet on Uneven Surfaces  Assistance Needed: Supervision or touching assistance  Comment: CGA using rollator with additional assist on O2 line/tank management  CARE Score: 4  Discharge Goal: Supervision or touching assistance     1 Step (Curb)  Comment: pt refused; expressed fear regardless of step height (2\"/4\") and even with 2-person assist  Reason if not Attempted: Patient refused  CARE Score: 7  Discharge Goal: Partial/moderate assistance     4 Steps  Comment: safety concerns related to impaired strength and endurance and increased anxiety/fear  Reason if not Attempted: Not attempted due to medical condition or safety concerns  CARE Score: 88  Discharge Goal: Partial/moderate assistance     12 Steps  Comment: pt does not demonstrate potential to progressing to this mobility task due to progressive weakness from underlying IBM  Reason if not Attempted: Not attempted due to medical condition or safety concerns  CARE Score: 88  Discharge Goal: Not Attempted       Wheelchair:  w/c Ability: Wheelchair Ability  Uses a Wheelchair and/or Scooter?: No                Balance:        Object: Picking Up Object  Assistance Needed: Partial/moderate assistance  Comment: Min A using rollator and reacher; required stabilization of AD as pt performed required task as pt did not manage hand brakes; pt used R hand to manipulate reacher due to chronic L hand limitation  CARE Score: 3  Discharge Goal: Independent    I      Exam:    Blood pressure (!) 146/80, pulse 74, temperature 98.1 °F (36.7 °C), temperature source Oral, resp. rate 18, height 5' 4\" (1.626 m), weight 137 lb 12.6 oz (62.5 kg), SpO2 93 %. General: ***    HEENT: ***    Pulmonary: ***    Cardiac: ***    Abdomen: Patient's abdomen is soft and nondistended. Bowel sounds were present throughout. There was no rebound, guarding or masses noted. Upper extremities: ***    Lower extremities: ***    Sitting balance was ***. Standing balance was ***.     Lab Results   Component Value Date    WBC 4.6 01/21/2023    HGB 11.4 (L) 01/21/2023    HCT 34.7 (L) 01/21/2023    MCV 94.8 01/21/2023     01/21/2023     No results found for: INR, PROTIME  Lab Results   Component Value Date    CREATININE 1.1 01/21/2023    BUN 37 (H) 01/21/2023     01/21/2023    K 3.5 01/21/2023    CL 98 (L) 01/21/2023    CO2 29 01/21/2023     Lab Results   Component Value Date    ALT 20 01/21/2023    AST 23 01/21/2023    ALKPHOS 67 01/21/2023    BILITOT 0.7 01/21/2023       Expected length of stay  prior to a supervised level of function for discharge home with a walker and HHC OT/PT is ***    Recommendations:    *** being used cautiously concerning her pancytopenia. Serial blood counts and monitoring closely for signs of bleeding complications. Weightbearing activities are expected to be limited but will be tried daily. GI prophylaxis. No new bruising or swelling. Uncontrolled pain: The patient requires acetaminophen and oxycodone for her pain related to her rib fractures, toe fractures and myositis. Tolerating her scheduled acetaminophen. Diathermy and frequent repositioning are available. Bowel intervention while on the analgesics. Dysphagia: A soft and bite-size diet is required at this point. Speech-language pathology is working on swallowing technique recovery. Upright for meds and meals. Thin liquids have been approved. Acute kidney injury: Minimizing fluctuations of blood pressure and the use of nephrotoxic medications. Serial chemistries to monitor renal function and the status of her hypokalemia. Supplementing her potassium as needed. Encouraging consistent oral hydration. Checking labs in AM.  Rib and toe fractures: Weightbearing as tolerated. Pain management. Incentive spirometry. No oxygen desaturations noted. Hypertension: Norvasc is required to control her systolic blood pressure. Target systolic blood pressure ranges 120-140. Vital signs are checked at rest and with activity. Her blood pressure is j within the target range today. Monitoring closely. IBS: Regular bowel intervention with Colace and Senokot. [place] food choices encouraged. Zofran as needed.

## 2023-01-26 NOTE — CARE COORDINATION
Patient reviewed at today's care conference. She'll dc home with supportive spouse 2/1. No new DME recommended. [facility]. Patient still requires o2 1L NOC. 1430:  case mgt met with patient in room. She's agreeable to dc date and plan. Confirmed she has no new DME needs. [facility] Her spouse will provide dc transport. Whiteboard updated. Patient provided with list of Medicare participating [place] [address] in the geographic area of the patient served. Patient selected [facility] and was provided with a comparative data handout from [address] [place] [address] website. The patient (and/or Family) was educated on the quality outcomes for each provider. Patient (and/or Family) demonstrated understanding. Per patient/family request, referral made to [facility].

## 2023-01-26 NOTE — PROGRESS NOTES
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 Lilian Dougherty, Pt can either wear postop shoe to bilateral feet or regular shoe as tolerated. Pt has 2 L O2 via NC. Interdisciplinary communication [x]   Cleared for therapy per nursing     []   RN notified about issues during session  []   RN updated on pt performance  []   Spoke with   []   Spoke with OT  []   Spoke with MD  []   Other:    Subjective observations and cognitive status: Pt resting in semi-flores's position, alert, states not sleeping well last night due to back pain, increased discomfort in bed, and increased anxiety. Pt confirms from spouse via phone call that the stair lift will be installed either Mon/Tue next week.      Pain level/location: 3/10       Location: posterior aspect of L ribcage   Discharge recommendations  Anticipated discharge date:  2023  Destination: []home alone   []home alone with assist PRN     [x] home w/ family      [] Continuous supervision  []SNF    [] Assisted living     [] Other:  Continued therapy: [x]HHC PT  []OUTPATIENT PT   [] No Further PT  []SNF PT  Caregiver training recommended: []Yes  [] No   Equipment needs: has 2WW and rollator      PT IRF-SNOW scores and goals for discharge assessment:   Roll Left and Right  Assistance Needed: Supervision or touching assistance  Comment: pt was able to tolerate rolling to R/L today without use of bed features with SBA  CARE Score: 4  Discharge Goal: Independent    Lying to Sitting on Side of Bed  Assistance Needed: Independent  Comment: Ind without use of bed features  CARE Score: 6  Discharge Goal: Independent    Sit to Stand  Assistance Needed: Supervision or touching assistance  Comment: SBA-Sup with rollator  CARE Score: 4  Discharge Goal: Independent    Chair/Bed-to-Chair Transfer  Assistance Needed: Supervision or touching assistance  Comment: SBA-Sup with rollator  CARE Score: 4  Discharge Goal: Independent    Walk 10 Feet?   Walk 10 Feet?: Yes    1 Step  1 Step?: Yes    Walking Ability  Does the Patient Walk?: Yes    Walk 10 Feet  Assistance Needed: Supervision or touching assistance  Comment: SBA using rollator and regular shoes  CARE Score: 4  Discharge Goal: Independent    Walk 50 Feet with Two Turns  Assistance Needed: Supervision or touching assistance  Comment: SBA using rollator and regular shoes  CARE Score: 4  Discharge Goal: Independent    Walk 150 Feet  Assistance Needed: Supervision or touching assistance  Comment: SBA using rollator and regular shoes; pt was able to ambulate up to 296 ft and in room air with SpO2 at 93% after maximal exertion; limited by R foot pain (4/10)  CARE Score: 4  Discharge Goal: Supervision or touching assistance    1 Step (Curb)  Assistance Needed: Partial/moderate assistance  Comment: pt was able to tolerate  2\" curb today using rollator with Min A on ascent (required steadying assist and some assistance to fully lift AD due to impaired strength and balance) and CGA on descent; required sequential cues on stepping pattern due to RLE weakness and increased R foot pain; pt initiated management of hand brakes prior to ascent/descent  Reason if not Attempted: Patient refused  CARE Score: 3  Discharge Goal: Partial/moderate assistance        Patient/Caregiver Education and Training:   []   Role of PT  []   Education about Dx  []   Use of call light for assist   []   HEP provided and explained   [x]   Treatment plan reviewed  []   Home safety  []   Wheelchair mobility/management   []   Body mechanics  []   Bed Mobility/Transfer technique  []   Gait technique/sequencing  []   Proper use of assistive device/adaptive equipment  [x]   Curb step management  []   Reinforced patient's precautions/mobility while maintaining precautions  []   Postural awareness  []   Family/caregiver training  [x]   Progress was updated and reviewed  with patient this date. []   Other:    Treatment Plan for Next Session: progress to 4\" curb step and stair training if able with 2-person assist for safety if needed; car transfer, object retrieval activity     Assessment: This pt demonstrated a positive response to today's treatment as evidenced by tolerance to rolling to R, decreased physical assist for level surface ambulation, tolerance to room air without desaturation with exertion, and tolerance to curb step training . The patient is making good progress toward established goals as evidenced by QI scores. Pt still expresses anxiety with step/stairs/ramp requiring encouragement and reassurance.       Treatment/Activity Tolerance:   [] Tolerated treatment with no adverse effects    [] Patient limited by fatigue  [x] Patient limited by pain   [] Patient limited by medical complications:    [] Adverse reaction to Tx:   [] Significant change in status    Safety:       []  bed alarm set    [x]  chair alarm set    []  Pt refused alarms                []  Telesitter activated      [x]  Gait belt used during tx session      []other:       Number of Minutes/Billable Intervention  Gait Training 30   Therapeutic Exercise    Neuro Re-Ed    Therapeutic Activity 30   Wheelchair Propulsion    Group    Other:    TOTAL 60     Social History  Social/Functional History  Lives With: Spouse [place]- in good health and can help PRN)  Type of Home: House  Home Layout: Multi-level (Tri-level 7 steps upstairs - however currently installing chair lift)  Home Access: Stairs to enter without rails  Entrance Stairs - Number of Steps: 2 ANNIE - Does have handle attached to the door of the home that she uses  Bathroom Shower/Tub: Tub/Shower unit  Bathroom Toilet: Standard  Bathroom Equipment: Shower chair, Grab bars in shower, 3-in-1 commode  Bathroom Accessibility: Walker accessible  Home Equipment: Cane, [address], Reacher, Walker, rolling  Has the patient had two or more falls in the past year or any fall with injury in the past year?: Yes (> 10 falls in the last year)  Receives Help From: Family  ADL Assistance: Independent  Homemaking Responsibilities: Yes  Meal Prep Responsibility: Secondary (Pt cooks 1-2 times a week.)  Laundry Responsibility: No ( completes.)  Cleaning Responsibility: No ( completes.)  Bill Paying/Finance Responsibility: No ( completes.)  Shopping Responsibility: Secondary (Pt states \"I go with him ~ 30% of the time\".  completes mostly.)  Dependent Care Responsibility: No  Health Care Management: Primary  Ambulation Assistance: Independent (Pt uses 2WW at night to go in and out of the bathroom, sometimes in the kitchen and states she uses the walker to help her get out of chairs. 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 Pt has cataracts. No pets at home but loves animals.  Pt uses pill box for 1 pill at home. Spouse manages finances. Additional Comments: Pt sleeps in regular flat bed. Objective                                                                                    Goals:  (Update in navigator)   : Long Term Goals  Time Frame for Long Term Goals : 10 tx days:  Long Term Goal 1: Pt will complete bed mobility (scooting, rolling R/L, and sup<->sit) Ind.  Long Term Goal 2: Pt will complete OOB transfers using 2WW/rollator Mod Ind. Long Term Goal 3: Pt will ambulate 10 ft and 50 ft with turns over level  surface using 2WW Mod Ind. Long Term Goal 4: Pt will ambulate >/=150 ft over level surface and 10 ft of uneven surface using rollator with SBA-Sup. Long Term Goal 5: Pt will ascend/descend curb step (2\"/4\" step height) and 4-5 steps using railings with Min A. Additional Goals?: Yes  Long term goal 6: Pt will complete object retrieval from the floor using 2WW/rollator and reacher Mod Ind.:        Plan of Care                                                                              Times per week: 5 days per week for a minimum of 60 minutes/day plus group as appropriate for 60 minutes.   Treatment to include Current Treatment Recommendations: Strengthening, ROM, Balance training, Functional mobility training, Transfer training, Endurance training, Gait training, Stair training, Pain management, Home exercise program, Safety education & training, Patient/Caregiver education & training, Equipment evaluation, education, & procurement, Positioning, Therapeutic activities    Electronically signed by   Arianna Medina, PT  1/26/2023, 2:27 PM

## 2023-01-26 NOTE — PROGRESS NOTES
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.41]       Date 2023       Day of ARU Week:  4   Time IN//810; 915/1050   Individual Tx Minutes 85 + 35   Group Tx Minutes    Co-Treat Minutes    Concurrent Tx Minutes    TOTAL Tx Time Mins 120   Variance Time    Variance Time []   Refusal due to:     []   Medical hold/reason:    []   Illness   []   Off Unit for test/procedure  []   Extra time needed to complete task  []   Therapeutic need  []   Other (specify):   Restrictions Restrictions/Precautions: Fall Risk, General Precautions, Weight Bearing         Communication with other providers: [x]   OK to see per nursing:     []   Spoke with team member regarding:      Subjective observations and cognitive status: Patient semi fowlers, c/o fatigue states she didn't sleep well previous night, patient tearful once we started a conversation, sad about her disease being progressive, talks about her grandchildren and how she feels like a burden on her , therapeutic use of self to allow patient to get her feelings out, feels better following conversation, ends with patient and therapist laughing and patient in a better state of mind, de lines full shower however washes up sinkside       Pain level/location:    3/10       Location: L side ribs    Discharge recommendations  Anticipated discharge date:  TBD  Destination: []home alone   []home alone w assist prn   [x] home w/ family    [] Continuous supervision       []SNF    [] Assisted living     [] Other:   Continued therapy: [x]HHC OT  []OUTPATIENT  OT   [] No Further OT  Equipment needs: None        ADLs:    Eating: Eating  Assistance Needed: Independent  Comment:  Mod I using compensatory rtechniques to open packages  CARE Score: 6  Discharge Goal: Set-up or clean-up assistance       Oral Hygiene: Oral Hygiene  Assistance Needed: Independent  Comment: Mod I seated sinkside  CARE Score: 6  Discharge Goal: Independent    UB/LB Bathing: Shower/Bathe Self  Assistance Needed: Supervision or touching assistance  Comment: Sup/SBA in stance, seated majority of wash up  CARE Score: 4  Discharge Goal: Independent    UB Dressing: Upper Body Dressing  Assistance Needed: Independent  Comment: X  CARE Score: 6  Discharge Goal: Independent         LB Dressing: Lower Body Dressing  Assistance Needed: Supervision or touching assistance  Comment: Sup/SBA in stance, uses figure four and forward flexion method to thread BLE into pants  CARE Score: 4  Discharge Goal: Independent    Donning and Bent Footwear: Putting On/Taking Off Footwear  Assistance Needed: Independent  Comment: uses figure four to leah socks, forward flexion and uses 4WW to prop up feet  to leah tennis shoes  CARE Score: 6  Discharge Goal: Independent      Toiletin [address] needed: Supervision or touching assistance  Comment: SBA to manage clothing, seated to complete hygiene  CARE Score: 4  Discharge Goal: Independent        Toilet Transfer  Assistance needed: Supervision or touching assistance  Comment: SBA  CARE Score: 4  Discharge Goal: Independent  Device Used:    [x]   Standard Toilet         [x]   Grab Bars           []  Bedside Commode       []   Elevated Toilet          []   Other:        Bed Mobility:       []   Pt received out of bed   Rolling R/L:    Scooting:   Mod I to EOB  Supine --> Sit:  Sup   Sit --> Supine:      Transfers:    Sit--> Stand:  Sup  Stand --> Sit:   Sup  Stand-Pivot:   Sup  Other:  verbal cues for body placement    Assistive device required for transfer:   4WW      Functional Mobility:    Assistance:  CGA cues for body placement using 4WW  Device:   []   Artie Calabash     [] Standard Walker []   Wheelchair        []   [employer]       [x]   4-Wheeled Albino Hawks         []   Cardiac Albino Hawks       []   Other:        Homemaking Tasks:   Patient retrieves clothing from closet and transports to bathroom at [facility] level       Additional Therapeutic activities/exercises completed this date:     [x]   ADL Training   [x]   Balance/Postural training     [x]   Bed/Transfer Training   [x]   Endurance Training Patient instructed in bilateral reciprocal patterned movement for 12 minutes with rest break at 6 minute caro while seated with min resistance, to increase strength and endurance to facilitate ADL and transfer/mobility tasks    []   Neuromuscular Re-ed   []   Nu-step:  Time:        Level:         #Steps:       []   Rebounder:    []  Seated     []  Standing        []   Supine Ther Ex (reps/sets):     []   Seated Ther Ex (reps/sets):     []   Standing Ther Ex (reps/sets):     [x]   Other: Patient instructed in  [facility] tasks grasp strength exercises and digit strength exercises manipulating items theraputty, squeeze ball, and clothes pins  in hand to facilitate ADL such as buttoning, zipping, and trying shoes       Comments:      Patient/Caregiver Education and Training:   [x]   Adaptive Equipment Use  [x]   Bed Mobility/Transfer Technique/Safety  [x]   Energy Conservation Tips  []   Family training  [x]   Postural Awareness  [x]   Safety During Functional Activities  []   Reinforced Patient's Precautions   []   Progress was updated and reviewed in Rehabtracker with patient and/or family this         date.     Treatment Plan for Next Session: POC to continue as tolerated         Treatment/Activity Tolerance:   [x] Tolerated treatment with no adverse effects    [] Patient limited by fatigue  [] Patient limited by pain   [] Patient limited by medical complications:    [] Adverse reaction to Tx:   [] Significant change in status    Safety:       []  bed alarm set    [x]  chair alarm set    []  Pt refused alarms []  Telesitter activated      [x]  Gait belt used during tx session      []other:       Number of Minutes/Billable Intervention  Therapeutic Exercise 15   ADL Self-care 60   Neuro Re-Ed    Therapeutic Activity 10 + 35   Group    Other:    TOTAL 120       Social History  Social/Functional History  Lives With: Spouse Lydia Hackett- in good health and can help PRN)  Type of Home: House  Home Layout: Multi-level (Tri-level 7 steps upstairs - however currently installing chair lift)  Home Access: Stairs to enter without rails  Entrance Stairs - Number of Steps: 2 ANNIE - Does have handle attached to the door of the home that she uses  Bathroom Shower/Tub: Tub/Shower unit  Bathroom Toilet: Standard  Bathroom Equipment: Shower chair, Grab bars in shower, 3-in-1 commode  Bathroom Accessibility: Walker accessible  Home Equipment: Cane, Rollator, Reacher, Walker, rolling  Has the patient had two or more falls in the past year or any fall with injury in the past year?: Yes (> 10 falls in the last year)  Receives Help From: Family  ADL Assistance: Independent  Homemaking Responsibilities: Yes  Meal Prep Responsibility: Secondary (Pt cooks 1-2 times a week.)  Laundry Responsibility: No ( completes.)  Cleaning Responsibility: No ( completes.)  Bill Paying/Finance Responsibility: No ( completes.)  Shopping Responsibility: Secondary (Pt states \"I go with him ~ 30% of the time\".  completes mostly.)  Dependent Care Responsibility: No  Health Care Management: Primary  Ambulation Assistance: Independent (Pt uses 2WW at night to go in and out of the bathroom, sometimes in the kitchen and states she uses the walker to help her get out of chairs.  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 Pt has cataracts. No pets at home but loves animals. Pt uses pill box for 1 pill at home. Spouse manages finances. Additional Comments: Pt sleeps in regular flat bed. Objective                                                                                    Goals:  (Update in navigator)  Short Term Goals  Time Frame for Short Term Goals: STGs=LTGs:  Long Term Goals  Time Frame for Long Term Goals : 10-12 days or until d/c. Long Term Goal 1: Pt will complete oral hygiene and grooming tasks with IND. Long Term Goal 2: Pt will complete total body bathing with AE PRN and Mod I.  Long Term Goal 3: Pt will complete UB dressing with IND. Long Term Goal 4: Pt will complete LB dressing with AE PRN and Mod I.  Long Term Goal 5: Pt will doff/don footwear with AE PRN and Mod I. Additional Goals?: Yes  Long Term Goal 6: Pt will complete toileting with IND. Long Term Goal 7: Pt will complete functional transfers (bed, chair, toilet, shower) with DME PRN and Mod I.  Long Term Goal 8: Pt will perform therex/therax to facilitate an increase in strength/endurance with emphasis on dynamic standing balance/tolerance > 8 mins with Mod I.  Long Term Goal 9: Pt will perform meal prep tasks with Mod I.:        Plan of Care                                                                              Times per week: 5 days per week for a minimum of 60 minutes/day plus group as appropriate for 60 minutes.   Treatment to include Occupational Therapy Plan  Current Treatment Recommendations: Strengthening, ROM, Endurance training, Functional mobility training, Safety education & training, Patient/Caregiver education & training, Pain management, Equipment evaluation, education, & procurement, Positioning, Self-Care / ADL, Home management training, Balance training, Coordination training    Electronically signed by   Yvon Villafuerte ARNULFO Parr,  1/26/2023, 9:29 AM

## 2023-01-27 PROCEDURE — 94761 N-INVAS EAR/PLS OXIMETRY MLT: CPT

## 2023-01-27 PROCEDURE — 97535 SELF CARE MNGMENT TRAINING: CPT

## 2023-01-27 PROCEDURE — 97530 THERAPEUTIC ACTIVITIES: CPT

## 2023-01-27 PROCEDURE — 1280000000 HC REHAB R&B

## 2023-01-27 PROCEDURE — 94150 VITAL CAPACITY TEST: CPT

## 2023-01-27 PROCEDURE — 97116 GAIT TRAINING THERAPY: CPT

## 2023-01-27 PROCEDURE — 6370000000 HC RX 637 (ALT 250 FOR IP): Performed by: STUDENT IN AN ORGANIZED HEALTH CARE EDUCATION/TRAINING PROGRAM

## 2023-01-27 PROCEDURE — 99232 SBSQ HOSP IP/OBS MODERATE 35: CPT | Performed by: PHYSICAL MEDICINE & REHABILITATION

## 2023-01-27 PROCEDURE — 6360000002 HC RX W HCPCS: Performed by: STUDENT IN AN ORGANIZED HEALTH CARE EDUCATION/TRAINING PROGRAM

## 2023-01-27 PROCEDURE — 6370000000 HC RX 637 (ALT 250 FOR IP): Performed by: PHYSICAL MEDICINE & REHABILITATION

## 2023-01-27 RX ADMIN — ACETAMINOPHEN 650 MG: 325 TABLET ORAL at 08:59

## 2023-01-27 RX ADMIN — POLYETHYLENE GLYCOL (3350) 17 G: 17 POWDER, FOR SOLUTION ORAL at 05:46

## 2023-01-27 RX ADMIN — ACETAMINOPHEN 650 MG: 325 TABLET ORAL at 17:56

## 2023-01-27 RX ADMIN — AMLODIPINE BESYLATE 5 MG: 5 TABLET ORAL at 08:59

## 2023-01-27 RX ADMIN — DOCUSATE SODIUM 100 MG: 100 CAPSULE, LIQUID FILLED ORAL at 08:58

## 2023-01-27 RX ADMIN — SENNOSIDES 8.6 MG: 8.6 TABLET, COATED ORAL at 20:41

## 2023-01-27 RX ADMIN — ACETAMINOPHEN 650 MG: 325 TABLET ORAL at 12:47

## 2023-01-27 RX ADMIN — ENOXAPARIN SODIUM 40 MG: 100 INJECTION SUBCUTANEOUS at 20:41

## 2023-01-27 RX ADMIN — OXYCODONE HYDROCHLORIDE 10 MG: 10 TABLET ORAL at 05:43

## 2023-01-27 RX ADMIN — ACETAMINOPHEN 650 MG: 325 TABLET ORAL at 20:41

## 2023-01-27 RX ADMIN — PANTOPRAZOLE SODIUM 40 MG: 40 TABLET, DELAYED RELEASE ORAL at 05:43

## 2023-01-27 ASSESSMENT — PAIN SCALES - GENERAL
PAINLEVEL_OUTOF10: 0
PAINLEVEL_OUTOF10: 3
PAINLEVEL_OUTOF10: 3
PAINLEVEL_OUTOF10: 7
PAINLEVEL_OUTOF10: 3
PAINLEVEL_OUTOF10: 2
PAINLEVEL_OUTOF10: 0

## 2023-01-27 ASSESSMENT — PAIN DESCRIPTION - DESCRIPTORS
DESCRIPTORS: SHARP
DESCRIPTORS: SHARP
DESCRIPTORS: DISCOMFORT
DESCRIPTORS: SHARP
DESCRIPTORS: DISCOMFORT

## 2023-01-27 ASSESSMENT — PAIN DESCRIPTION - LOCATION
LOCATION: RIB CAGE

## 2023-01-27 ASSESSMENT — PAIN DESCRIPTION - ORIENTATION
ORIENTATION: LEFT

## 2023-01-27 NOTE — PROGRESS NOTES
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 Wang Cisneros, Pt can either wear postop shoe to bilateral feet or regular shoe as tolerated. Pt has 2 L O2 via NC. Interdisciplinary communication [x]   Cleared for therapy per nursing     []   RN notified about issues during session  []   RN updated on pt performance  []   Spoke with   []   Spoke with OT  []   Spoke with MD  []   Other:    Subjective observations and cognitive status: Pt resting in recliner, breathing comfortably in room air, ate lunch, motivated to participate in mobility tasks.     Pain level/location:   3 /10       Location: R foot with long distance ambulation    Discharge recommendations  Anticipated discharge date:  2023  Destination: []home alone   []home alone with assist PRN     [x] home w/ family      [] Continuous supervision  []SNF    [] Assisted living     [] Other:  Continued therapy: [x]HHC PT  []OUTPATIENT PT   [] No Further PT  []SNF PT  Caregiver training recommended: []Yes  [] No   Equipment needs: has 2WW and rollator      PT IRF-SNOW scores and goals for discharge assessment:   Sit to Stand  Assistance Needed: Independent  Comment: Mod Ind with rollator; pt was more attentive and consistent with management of hand brakes prior to standing; immediate standing balance was consistently steady  CARE Score: 6  Discharge Goal: Independent    Chair/Bed-to-Chair Transfer  Assistance Needed: Supervision or touching assistance  Comment: SBA-Sup with rollator  CARE Score: 4  Discharge Goal: Independent    Toilet Transfer  Assistance needed: Independent  Comment: Mod Ind with rollator and BSC frame over toilet  CARE Score: 6  Discharge Goal: Independent    Walk 10 Feet? Walk 10 Feet?: Yes      Walking Ability  Does the Patient Walk?: Yes    Walk 10 Feet  Assistance Needed: Independent  Comment: Mod Ind using rollator and regular shoes (gait quality consistently steady)  CARE Score: 6  Discharge Goal: Independent    Walk 50 Feet with Two Turns  Assistance Needed: Independent  Comment: Mod Ind using rollator and regular shoes (gait quality consistently steady)  CARE Score: 6  Discharge Goal: Independent    Walk 150 Feet  Assistance Needed: Independent  Comment:  Mod Ind using rollator and regular shoes (gait quality consistently steady); pt was able to ambulate up to 364 ft consecutively  CARE Score: 6  Discharge Goal: Supervision or touching assistance    Walking 10 Feet on Uneven Surfaces  Assistance Needed: Supervision or touching assistance  Comment: CGA using rollator (pt expressed fear of falling that she immediately requested for PT to steady her)  CARE Score: 4  Discharge Goal: Supervision or touching assistance    4 Steps  Assistance Needed: Supervision or touching assistance  Comment: pt progressed to being able to ascend/descend 5 steps (4\"/6\" step heights) with CGA with both hands holding L railing on ascent and railings on descent with step-to pattern leading with LLE on ascent and RLE on descent; pt had to slightly position at an angle on descent due to her impaired strength and balance  Reason if not Attempted: Not attempted due to medical condition or safety concerns  CARE Score: 4  Discharge Goal: Partial/moderate assistance    Additional Therapeutic activities/exercises completed this date:     []   Nu-step:  Time:        Level:         #Steps:       []   Rebounder:    []  Seated     []  Standing        []   Balance training         []   Postural training    []   Supine ther ex (reps/sets):     []   Seated ther ex (reps/sets):     []   Standing ther ex (reps/sets):     [x]   Other: Toileting activity completed Ind    []   Other:    Comments:      Patient/Caregiver Education and Training:   []   Role of PT  []   Education about Dx  []   Use of call light for assist   []   HEP provided and explained   [x]   Treatment plan reviewed  []   Home safety  []   Wheelchair mobility/management   []   Body mechanics  []   Bed Mobility/Transfer technique  []   Gait technique/sequencing  []   Proper use of assistive device/adaptive equipment  [x]   Stair training/Advanced mobility safety and technique  []   Reinforced patient's precautions/mobility while maintaining precautions  []   Postural awareness  []   Family/caregiver training  [x]   Progress was updated and reviewed with patient  this date. []   Other:    Treatment Plan for Next Session: progress stairs if able to ascend >5 steps following established technique; object retrieval activity, curb step training, bed mobility in regular bed     Assessment: This pt demonstrated a positive response to today's treatment as evidenced by tolerance to stair training. The patient is making good progress toward established goals as evidenced by QI scores.      Treatment/Activity Tolerance:   [x] Tolerated treatment with no adverse effects    [] Patient limited by fatigue  [] Patient limited by pain   [] Patient limited by medical complications:    [] Adverse reaction to Tx:   [] Significant change in status    Safety:       []  bed alarm set    [x]  chair alarm set []  Pt refused alarms                []  Telesitter activated      [x]  Gait belt used during tx session      []other:       Number of Minutes/Billable Intervention  Gait Training 30   Therapeutic Exercise    Neuro Re-Ed    Therapeutic Activity 30   Wheelchair Propulsion    Group    Other:    TOTAL 60     Social History  Social/Functional History  Lives With: Spouse Rafa Lowry- in good health and can help PRN)  Type of Home: House  Home Layout: Multi-level (Tri-level 7 steps upstairs - however currently installing chair lift)  Home Access: Stairs to enter without rails  Entrance Stairs - Number of Steps: 2 ANNIE - Does have handle attached to the door of the home that she uses  Bathroom Shower/Tub: Tub/Shower unit  Bathroom Toilet: Standard  Bathroom Equipment: Shower chair, Grab bars in shower, 3-in-1 commode  Bathroom Accessibility: Walker accessible  Home Equipment: Cane, Rollator, Reacher, Walker, rolling  Has the patient had two or more falls in the past year or any fall with injury in the past year?: Yes (> 10 falls in the last year)  Receives Help From: Family  ADL Assistance: Independent  Homemaking Responsibilities: Yes  Meal Prep Responsibility: Secondary (Pt cooks 1-2 times a week.)  Laundry Responsibility: No ( completes.)  Cleaning Responsibility: No ( completes.)  Bill Paying/Finance Responsibility: No ( completes.)  Shopping Responsibility: Secondary (Pt states \"I go with him ~ 30% of the time\".  completes mostly.)  Dependent Care Responsibility: No  Health Care Management: Primary  Ambulation Assistance: Independent (Pt uses 2WW at night to go in and out of the bathroom, sometimes in the kitchen and states she uses the walker to help her get out of chairs.  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 Pt has cataracts. No pets at home but loves animals. Pt uses pill box for 1 pill at home. Spouse manages finances. Additional Comments: Pt sleeps in regular flat bed. Objective                                                                                    Goals:  (Update in navigator)   : Long Term Goals  Time Frame for Long Term Goals : 10 tx days:  Long Term Goal 1: Pt will complete bed mobility (scooting, rolling R/L, and sup<->sit) Ind.  Long Term Goal 2: Pt will complete OOB transfers using 2WW/rollator Mod Ind. Long Term Goal 3: Pt will ambulate 10 ft and 50 ft with turns over level  surface using 2WW Mod Ind. Long Term Goal 4: Pt will ambulate >/=150 ft over level surface and 10 ft of uneven surface using rollator with SBA-Sup. Long Term Goal 5: Pt will ascend/descend curb step (2\"/4\" step height) and 4-5 steps using railings with Min A. Additional Goals?: Yes  Long term goal 6: Pt will complete object retrieval from the floor using 2WW/rollator and reacher Mod Ind.:        Plan of Care                                                                              Times per week: 5 days per week for a minimum of 60 minutes/day plus group as appropriate for 60 minutes.   Treatment to include Current Treatment Recommendations: Strengthening, ROM, Balance training, Functional mobility training, Transfer training, Endurance training, Gait training, Stair training, Pain management, Home exercise program, Safety education & training, Patient/Caregiver education & training, Equipment evaluation, education, & procurement, Positioning, Therapeutic activities    Electronically signed by   Charli Huynh, PT  1/27/2023, 3:22 PM

## 2023-01-27 NOTE — PROGRESS NOTES
Merle Rivera    : 1950  Acct #: [de-identified]  MRN: [id number]              PM&R Progress Note      Admitting diagnosis: ***    Comorbid diagnoses impacting rehabilitation: ***    Chief complaint: ***    Prior (baseline) level of function: Independent. Current level of function:         Current  IRF-SNOW and Goals:   Occupational Therapy:    Short Term Goals  Time Frame for Short Term Goals: STGs=LTGs :   Long Term Goals  Time Frame for Long Term Goals : 10-12 days or until d/c. Long Term Goal 1: Pt will complete oral hygiene and grooming tasks with IND. Long Term Goal 2: Pt will complete total body bathing with AE PRN and Mod I.  Long Term Goal 3: Pt will complete UB dressing with IND. Long Term Goal 4: Pt will complete LB dressing with AE PRN and Mod I.  Long Term Goal 5: Pt will doff/don footwear with AE PRN and Mod I. Additional Goals?: Yes  Long Term Goal 6: Pt will complete toileting with IND. Long Term Goal 7: Pt will complete functional transfers (bed, chair, toilet, shower) with DME PRN and Mod I.  Long Term Goal 8: Pt will perform therex/therax to facilitate an increase in strength/endurance with emphasis on dynamic standing balance/tolerance > 8 mins with Mod I.  Long Term Goal 9: Pt will perform meal prep tasks with Mod I. :                                       Eating: Eating  Assistance Needed: Independent  Comment: Mod I using compensatory rtechniques to open packages  CARE Score: 6  Discharge Goal: Set-up or clean-up assistance       Oral Hygiene: Oral Hygiene  Assistance Needed: Independent  Comment:  Mod I seated sinkside  CARE Score: 6  Discharge Goal: Independent    UB/LB Bathing: Shower/Bathe Self  Assistance Needed: Supervision or touching assistance  Comment: Sup/SBA in stance, seated majority of wash up  CARE Score: 4  Discharge Goal: Independent    UB Dressing: Upper Body Dressing  Assistance Needed: Independent  Comment: X  CARE Score: 6  Discharge Goal: Independent LB Dressing: Lower Body Dressing  Assistance Needed: Supervision or touching assistance  Comment: Sup/SBA in stance, uses figure four and forward flexion method to thread BLE into pants  CARE Score: 4  Discharge Goal: Independent    Donning and [place] Footwear: Putting On/Taking Off Footwear  Assistance Needed: Independent  Comment: uses figure four to leah socks, forward flexion and uses 4WW to prop up feet  to leah tennis shoes  CARE Score: 6  Discharge Goal: Independent      Toiletin [address] needed: Supervision or touching assistance  Comment: SBA to manage clothing, seated to complete hygiene  CARE Score: 4  Discharge Goal: Independent      Toilet Transfers: Toilet Transfer  Assistance needed: Supervision or touching assistance  Comment: SBA  CARE Score: 4  Discharge Goal: Independent    Physical Therapy:         Long Term Goals  Time Frame for Long Term Goals : 10 tx days:  Long Term Goal 1: Pt will complete bed mobility (scooting, rolling R/L, and sup<->sit) Ind.  Long Term Goal 2: Pt will complete OOB transfers using 2WW/rollator Mod Ind. Long Term Goal 3: Pt will ambulate 10 ft and 50 ft with turns over level  surface using 2WW Mod Ind. Long Term Goal 4: Pt will ambulate >/=150 ft over level surface and 10 ft of uneven surface using rollator with SBA-Sup. Long Term Goal 5: Pt will ascend/descend curb step (2\"/4\" step height) and 4-5 steps using railings with Min A. Additional Goals?: Yes  Long term goal 6: Pt will complete object retrieval from the floor using 2WW/rollator and reacher Mod Ind.       Bed Mobility:   Sit to Lying  Assistance Needed: Supervision or touching assistance  Comment: SBA without use of bed features  CARE Score: 4  Discharge Goal: Independent  Roll Left and Right  Assistance Needed: Supervision or touching assistance  Comment: pt was able to tolerate rolling to R/L today without use of bed features with SBA  Reason if not Attempted: Not attempted due to medical condition or safety concerns  CARE Score: 4  Discharge Goal: Independent  Lying to Sitting on Side of Bed  Assistance Needed: Independent  Comment: Ind without use of bed features  CARE Score: 6  Discharge Goal: Independent    Transfers:    Sit to Stand  Assistance Needed: Supervision or touching assistance  Comment: SBA-Sup with rollator  CARE Score: 4  Discharge Goal: Independent  Chair/Bed-to-Chair Transfer  Assistance Needed: Supervision or touching assistance  Comment: SBA-Sup with rollator  CARE Score: 4  Discharge Goal: Independent  Toilet Transfer  Assistance needed: Supervision or touching assistance  Comment: CGA using 2WW and BSC frame over toilet with additional assist on O2 line management  CARE Score: 4  Car Transfer  Assistance Needed: Supervision or touching assistance  Comment: CGA using rollator with additional assist on O2 line management  CARE Score: 4  Discharge Goal: Independent    Ambulation:    Walking Ability  Does the Patient Walk?: Yes     Walk 10 Feet  Assistance Needed: Supervision or touching assistance  Comment: SBA using rollator and regular shoes  CARE Score: 4  Discharge Goal: Independent     Walk 50 Feet with Two Turns  Assistance Needed: Supervision or touching assistance  Comment: SBA using rollator and regular shoes  CARE Score: 4  Discharge Goal: Independent     Walk 150 Feet  Assistance Needed: Supervision or touching assistance  Comment: SBA using rollator and regular shoes; pt was able to ambulate up to 296 ft and in room air with SpO2 at 93% after maximal exertion; limited by R foot pain (4/10)  CARE Score: 4  Discharge Goal: Supervision or touching assistance     Walking 10 Feet on Uneven Surfaces  Assistance Needed: Supervision or touching assistance  Comment: CGA using rollator with additional assist on O2 line/tank management  CARE Score: 4  Discharge Goal: Supervision or touching assistance     1 Step (Curb)  Assistance Needed: Partial/moderate assistance  Comment: pt was able to tolerate  2\" curb today using rollator with Min A on ascent (required steadying assist and some assistance to fully lift AD due to impaired strength and balance) and CGA on descent; required sequential cues on stepping pattern due to RLE weakness and increased R foot pain; pt initiated management of hand brakes prior to ascent/descent  Reason if not Attempted: Patient refused  CARE Score: 3  Discharge Goal: Partial/moderate assistance     4 Steps  Comment: safety concerns related to impaired strength and endurance and increased anxiety/fear  Reason if not Attempted: Not attempted due to medical condition or safety concerns  CARE Score: 88  Discharge Goal: Partial/moderate assistance     12 Steps  Comment: pt does not demonstrate potential to progressing to this mobility task due to progressive weakness from underlying IBM  Reason if not Attempted: Not attempted due to medical condition or safety concerns  CARE Score: 88  Discharge Goal: Not Attempted       Wheelchair:  w/c Ability: Wheelchair Ability  Uses a Wheelchair and/or Scooter?: No                Balance:        Object: Picking Up Object  Assistance Needed: Partial/moderate assistance  Comment: Min A using rollator and reacher; required stabilization of AD as pt performed required task as pt did not manage hand brakes; pt used R hand to manipulate reacher due to chronic L hand limitation  CARE Score: 3  Discharge Goal: Independent    I      Exam:    Blood pressure (!) 151/74, pulse 76, temperature 97.7 °F (36.5 °C), temperature source Oral, resp. rate 16, height 5' 4\" (1.626 m), weight 135 lb 1.6 oz (61.3 kg), SpO2 93 %. General: ***    HEENT: ***    Pulmonary: ***    Cardiac: ***    Abdomen: Patient's abdomen is soft and nondistended. Bowel sounds were present throughout. There was no rebound, guarding or masses noted. Upper extremities: ***    Lower extremities: ***    Sitting balance was ***.   Standing balance was ***. Lab Results   Component Value Date    WBC 3.6 (L) 01/26/2023    HGB 11.0 (L) 01/26/2023    HCT 33.1 (L) 01/26/2023    MCV 94.3 01/26/2023     01/26/2023     No results found for: INR, PROTIME  Lab Results   Component Value Date    CREATININE 0.7 01/26/2023    BUN 18 01/26/2023     (L) 01/26/2023    K 3.9 01/26/2023    CL 99 01/26/2023    CO2 29 01/26/2023     Lab Results   Component Value Date    ALT 20 01/21/2023    AST 23 01/21/2023    ALKPHOS 67 01/21/2023    BILITOT 0.7 01/21/2023       Expected length of stay  prior to a supervised level of function for discharge home with a walker and C OT/PT is ***    Recommendations:    ***    Counseling and Coordination of Care: In care conference today I met with the patient's OT, PT, RN and . We discussed the patient's problems, progress and prognosis. Disposition issues were clarified and plans were established for ongoing rehabilitation efforts beyond the ARU stay. I reviewed this information with the patient during a second distinct visit with the patient. More than half of the total time of 35 minutes spent with the patient involved counseling and coordination of care. daily is being used cautiously concerning her pancytopenia. Serial blood counts and monitoring closely for signs of bleeding complications. Weightbearing activities have been somewhat limited but still tried daily. GI prophylaxis. No clinical signs of blood loss. Uncontrolled pain: The patient requires acetaminophen and oxycodone for her pain related to her rib fractures, toe fractures and myositis. Tolerating her scheduled acetaminophen. Diathermy and frequent repositioning are available. Bowel intervention while on the analgesics. Dysphagia: A soft and bite-size diet is required at this point. Speech-language pathology is working on swallowing technique recovery. Upright for meds and meals. Thin liquids have been tolerated reasonably well. Acute kidney injury: Minimizing fluctuations of blood pressure and the use of nephrotoxic medications. Serial chemistries to monitor renal function and the status of her hypokalemia. Supplementing her potassium as needed. Encouraging consistent oral hydration. Checking labs in AM.  Rib and toe fractures: Weightbearing as tolerated. Pain management. Incentive spirometry. Nocturnal oxygen desaturations noted. Will increase ventilation with nebulizers and encourage spirometry use. Hypertension: Norvasc is required to control her systolic blood pressure. Target systolic blood pressure ranges 120-140. Vital signs are checked at rest and with activity. Her blood pressure is just above the target range today. Monitoring closely. IBS: Regular bowel intervention with Colace and Senokot. [place] food choices encouraged. Zofran as needed. Counseling and Coordination of Care: In care conference today I met with the patient's OT, PT, RN and . We discussed the patient's problems, progress and prognosis. Disposition issues were clarified and plans were established for ongoing rehabilitation efforts beyond the ARU stay.  I reviewed this information with the patient during a second distinct visit with the patient. More than half of the total time of 35 minutes spent with the patient involved counseling and coordination of care.

## 2023-01-27 NOTE — PROGRESS NOTES
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]       Date 2023       Day of ARU Week:  5   Time IN/OUT 1050/1155   Individual Tx Minutes 65   Group Tx Minutes    Co-Treat Minutes    Concurrent Tx Minutes    TOTAL Tx Time Mins 65   Variance Time    Variance Time []   Refusal due to:     []   Medical hold/reason:    []   Illness   []   Off Unit for test/procedure  []   Extra time needed to complete task  []   Therapeutic need  []   Other (specify):   Restrictions Restrictions/Precautions: Fall Risk, General Precautions, Weight Bearing         Communication with other providers: [x]   OK to see per nursing:     []   Spoke with team member regarding:      Subjective observations and cognitive status: Patient sitting up in wc upon approach, pleasant and agreeable to therapy states she is happy about getting dc date but anxious about going home      Pain level/location:   3 /10       Location: Rib L side    Discharge recommendations  Anticipated discharge date:    Destination: []home alone   []home alone w assist prn   [x] home w/ family    [] Continuous supervision       []SNF    [] Assisted living     [] Other:   Continued therapy: [x]HHC OT  []OUTPATIENT  OT   [] No Further OT  Equipment needs: None        ADLs:    Eating: Eating  Assistance Needed: Independent  Comment: X  CARE Score: 6  Discharge Goal: Set-up or clean-up assistance       Oral Hygiene: Oral Hygiene  Assistance Needed: Independent  Comment: X  CARE Score: 6  Discharge Goal: Independent    UB/LB Bathing: Shower/Bathe Self  Assistance Needed: Supervision or touching assistance  Comment: Sup seated majority of shower, sup in stance 2* to  patients anxiety  CARE Score: 4  Discharge Goal: Independent    UB Dressing: Upper Body Dressing  Assistance Needed: Independent  Comment: X  CARE Score: 6  Discharge Goal: Independent         LB Dressing: Lower Body Dressing  Assistance Needed: Independent  Comment: Ind using figure four and forward flexion method to thread BLE in pants, able to manage over hips without support  CARE Score: 6  Discharge Goal: Independent    Donning and [employer] Footwear: Putting On/Taking Off Footwear  Assistance Needed: Independent  Comment: USes figure four/forward flexion method  CARE Score: 6  Discharge Goal: Independent      Toiletin [address] needed: Independent  Comment: able to manage pants over hips with unilateral support in stance, complete hygiene seated  CARE Score: 6  Discharge Goal: Independent      Toilet Transfers: Toilet Transfer  Assistance needed: Supervision or touching assistance  Comment: Sup  CARE Score: 4  Discharge Goal: Independent  Device Used:    [x]   Standard Toilet         [x]   Grab Bars           []  Bedside Commode       []   Elevated Toilet          []   Other:        Bed Mobility:           [x]   Pt received out of bed   Rolling R/L:    Scooting:    Supine --> Sit:    Sit --> Supine:      Transfers:    Sit--> Stand:  Sup  Stand --> Sit:   Mod I   Stand-Pivot:   Sup  Other:    Assistive device required for transfer:   RW      Functional Mobility:  throughout room/bathroom   Assistance:   Mod I   Device:   [x]   Rolling Walker     []   Standard Walker []   Wheelchair        []   Shelli beach       []   4-Wheeled Clint Micky         []   Cardiac Clint Micky       []   Other:        Homemaking Tasks:   patient retrieves clothing from closet and transports to bathroom at [address] [place] [address]       Additional Therapeutic activities/exercises completed this date:     [x]   ADL Training   [x]   Balance/Postural training     [x]   Bed/Transfer Training   [x]   Endurance Training   []   Neuromuscular Re-ed   []   Nu-step:  Time:        Level: #Steps:       []   Rebounder:    []  Seated     []  Standing        []   Supine Ther Ex (reps/sets):     [x]   Seated Ther Ex (reps/sets):  Patient instructed in [address] task at table top using pegs s c increased challenge to LUE fills entire mat and takes them off    []   Standing Ther Ex (reps/sets):     []   Other:      Comments:      Patient/Caregiver Education and Training:   [x]   [employer] Equipment Use  [x]   Bed Mobility/Transfer Technique/Safety  [x]   Energy Conservation Tips  []   Family training  [x]   Postural Awareness  [x]   Safety During Functional Activities  []   Reinforced Patient's Precautions   []   Progress was updated and reviewed in Rehabtracker with patient and/or family this         date.     Treatment Plan for Next Session: POC to continue as tolerated            Treatment/Activity Tolerance:   [x] Tolerated treatment with no adverse effects    [] Patient limited by fatigue  [] Patient limited by pain   [] Patient limited by medical complications:    [] Adverse reaction to Tx:   [] Significant change in status    Safety:       []  bed alarm set    [x]  chair alarm set    []  Pt refused alarms                []  Telesitter activated      [x]  Gait belt used during tx session      []other:       Number of Minutes/Billable Intervention  Therapeutic Exercise    ADL Self-care 45   Neuro Re-Ed    Therapeutic Activity 20   Group    Other:    TOTAL 65       Social History  Social/Functional History  Lives With: Spouse Julia Abreu- in good health and can help PRN)  Type of Home: House  Home Layout: Multi-level (Tri-level 7 steps upstairs - however currently installing chair lift)  Home Access: Stairs to enter without rails  Entrance Stairs - Number of Steps: 2 ANNIE - Does have handle attached to the door of the home that she uses  Bathroom Shower/Tub: Tub/Shower unit  Bathroom Toilet: Standard  Bathroom Equipment: Shower chair, Grab bars in shower, 3-in-1 commode  Bathroom Accessibility: Walker accessible  Home Equipment: Mykel Kanaris, Rollator, Reacher, Walker, rolling  Has the patient had two or more falls in the past year or any fall with injury in the past year?: Yes (> 10 falls in the last year)  Receives Help From: Family  ADL Assistance: Independent  Homemaking Responsibilities: Yes  Meal Prep Responsibility: Secondary (Pt cooks 1-2 times a week.)  Laundry Responsibility: No ( completes.)  Cleaning Responsibility: No ( completes.)  Bill Paying/Finance Responsibility: No ( completes.)  Shopping Responsibility: Secondary (Pt states \"I go with him ~ 30% of the time\".  completes mostly.)  Dependent Care Responsibility: No  Health Care Management: Primary  Ambulation Assistance: Independent (Pt uses 2WW at night to go in and out of the bathroom, sometimes in the kitchen and states she uses the walker to help her get out of chairs. Pt uses rollator in the community.)  Transfer Assistance: Needs assistance (Pt requires assist with shower transfers and sometimes transfers out of a chair.)  Active : No  Mode of Transportation: [employer], Family  Education: HS education  Occupation: Retired  Type of Occupation: [employer]-  Leisure & Hobbies: Pt enjoys paint by numbers, cooking and reading. Pt has cataracts. No pets at home but loves animals. Pt uses pill box for 1 pill at home. Spouse manages finances. Additional Comments: Pt sleeps in regular flat bed. Objective                                                                                    Goals:  (Update in navigator)  Short Term Goals  Time Frame for Short Term Goals: STGs=LTGs:  Long Term Goals  Time Frame for Long Term Goals : 10-12 days or until d/c. Long Term Goal 1: Pt will complete oral hygiene and grooming tasks with IND. Long Term Goal 2: Pt will complete total body bathing with AE PRN and Mod I.  Long Term Goal 3: Pt will complete UB dressing with IND.   Long Term Goal 4: Pt will complete LB dressing with AE PRN and Mod I.  Long Term Goal 5: Pt will doff/don footwear with AE PRN and Mod I. Additional Goals?: Yes  Long Term Goal 6: Pt will complete toileting with IND. Long Term Goal 7: Pt will complete functional transfers (bed, chair, toilet, shower) with DME PRN and Mod I.  Long Term Goal 8: Pt will perform therex/therax to facilitate an increase in strength/endurance with emphasis on dynamic standing balance/tolerance > 8 mins with Mod I.  Long Term Goal 9: Pt will perform meal prep tasks with Mod I.:        Plan of Care                                                                              Times per week: 5 days per week for a minimum of 60 minutes/day plus group as appropriate for 60 minutes.   Treatment to include Occupational Therapy Plan  Current Treatment Recommendations: Strengthening, ROM, Endurance training, Functional mobility training, Safety education & training, Patient/Caregiver education & training, Pain management, Equipment evaluation, education, & procurement, Positioning, Self-Care / ADL, Home management training, Balance training, Coordination training    Electronically signed by   ARNULFO Cole,  1/27/2023, 11:30 AM

## 2023-01-27 NOTE — PROGRESS NOTES
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 Barbosa, Pt can either wear postop shoe to bilateral feet or regular shoe as tolerated. Pt has 2 L O2 via NC.   Interdisciplinary communication [x]   Cleared for therapy per nursing     []   RN notified about issues during session  []   RN updated on pt performance  []   Spoke with   []   Spoke with OT  []   Spoke with MD  []   Other:    Subjective observations and cognitive status: Pt resting in bed, alert and pleasant, states sleeping better last night, confirmed use of supplemental O2 last night but tolerating room air since she woke up this AM. Pt reports mild lightheadedness at the beginning of this tx session but willing to ambulate.    Pain level/location:   3 /10       Location: posterior L ribcage and lateral aspect of R lower leg    Discharge recommendations  Anticipated discharge date:   2023  Destination: []home alone   []home alone with assist PRN     [x] home w/ family      [] Continuous supervision  []SNF    [] Assisted living     [] Other:  Continued therapy: [x]HHC PT  []OUTPATIENT PT   [] No Further PT  []SNF  PT  Caregiver training recommended: []Yes  [] No   Equipment needs: has 2WW and rollator      PT IRF-SNOW scores and goals for discharge assessment:     Lying to Sitting on Side of Bed  Assistance Needed: Independent  Comment: Ind without use of bed features  CARE Score: 6  Discharge Goal: Independent    Sit to Stand  Assistance Needed: Supervision or touching assistance  Comment: SBA-Sup with rollator  CARE Score: 4  Discharge Goal: Independent    Chair/Bed-to-Chair Transfer  Assistance Needed: Supervision or touching assistance  Comment: SBA-Sup with rollator  CARE Score: 4  Discharge Goal: Independent    Toilet Transfer  Assistance needed: Supervision or touching assistance  Comment: SBA with rollator and BSC frame over toilet  CARE Score: 4  Discharge Goal: Independent    Car Transfer  Assistance Needed: Supervision or touching assistance  Comment: SBA-Sup with rollator  CARE Score: 4  Discharge Goal: Independent    Walk 10 Feet? Walk 10 Feet?: Yes  Walking Ability  Does the Patient Walk?: Yes    Walk 10 Feet  Assistance Needed: Independent  Comment: Mod Ind using rollator and regular shoes (gait quality consistently steady)  CARE Score: 6  Discharge Goal: Independent    Walk 50 Feet with Two Turns  Assistance Needed: Independent  Comment: Mod Ind using rollator and regular shoes (gait quality consistently steady)  CARE Score: 6  Discharge Goal: Independent    Walk 150 Feet  Assistance Needed: Independent  Comment:  Mod Ind using rollator and regular shoes (gait quality consistently steady); pt was able to ambulate up to 364 ft consecutively  CARE Score: 6  Discharge Goal: Supervision or touching assistance    4 Steps  Comment: pt was able to progress to 3 steps (4\" step height only) with CGA following established technique that pt performed at baseline (both hands holding L railing on ascent and holding bilat railings on descent with step to pattern leading with LLE on ascent and RLE on descent      Additional Therapeutic activities/exercises completed this date:     []   Nu-step:  Time:        Level:         #Steps:       []   Rebounder:    []  Seated     []  Standing        [x]   Balance training: hand washing in unsupported stance Ind         []   Postural training    []   Supine ther ex (reps/sets):     []   Seated ther ex (reps/sets):     []   Standing ther ex (reps/sets):     [x]   Other: Toileting activity completed Ind    []   Other:    Comments:      Patient/Caregiver Education and Training:   []   Role of PT  []   Education about Dx  []   Use of call light for assist   []   HEP provided and explained   [x]   Treatment plan reviewed  []   Home safety  []   Wheelchair mobility/management   []   Body mechanics  []   Bed Mobility/Transfer technique  []   Gait technique/sequencing  []   Proper use of assistive device/adaptive equipment  [x]   Stair training  []   Reinforced patient's precautions/mobility while maintaining precautions  []   Postural awareness  []   Family/caregiver training  [x]   Progress was updated and reviewed  with patient this date. []   Other:    Treatment Plan for Next Session: attempt 5 steps (4\"/6\" step heights for stair training), bed mobility training in regular bed, advanced gait training using rollator     Assessment: This pt demonstrated a positive response to today's treatment as evidenced by steady gait quality throughout level surface and tolerance to stair training. The patient is making god progress toward established goals as evidenced by QI scores.      Treatment/Activity Tolerance:   [x] Tolerated treatment with no adverse effects    [] Patient limited by fatigue  [] Patient limited by pain   [] Patient limited by medical complications:    [] Adverse reaction to Tx:   [] Significant change in status    Safety:       []  bed alarm set    [x]  chair alarm set    []  Pt refused alarms                []  Telesitter activated      [x]  Gait belt used during tx session      []other: Number of Minutes/Billable Intervention  Gait Training 30   Therapeutic Exercise    Neuro Re-Ed    Therapeutic Activity 30   Wheelchair Propulsion    Group    Other:    TOTAL 60     Social History  Social/Functional History  Lives With: Spouse Ritu Serrano- in good health and can help PRN)  Type of Home: House  Home Layout: Multi-level (Tri-level 7 steps upstairs - however currently installing chair lift)  Home Access: Stairs to enter without rails  Entrance Stairs - Number of Steps: 2 ANNIE - Does have handle attached to the door of the home that she uses  Bathroom Shower/Tub: Tub/Shower unit  Bathroom Toilet: Standard  Bathroom Equipment: Shower chair, Grab bars in shower, 3-in-1 commode  Bathroom Accessibility: Walker accessible  Home Equipment: Cane, Rollator, Reacher, Walker, rolling  Has the patient had two or more falls in the past year or any fall with injury in the past year?: Yes (> 10 falls in the last year)  Receives Help From: Family  ADL Assistance: Independent  Homemaking Responsibilities: Yes  Meal Prep Responsibility: Secondary (Pt cooks 1-2 times a week.)  Laundry Responsibility: No ( completes.)  Cleaning Responsibility: No ( completes.)  Bill Paying/Finance Responsibility: No ( completes.)  Shopping Responsibility: Secondary (Pt states \"I go with him ~ 30% of the time\".  completes mostly.)  Dependent Care Responsibility: No  Health Care Management: Primary  Ambulation Assistance: Independent (Pt uses 2WW at night to go in and out of the bathroom, sometimes in the kitchen and states she uses the walker to help her get out of chairs.  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 Pt has cataracts. No pets at home but loves animals. Pt uses pill box for 1 pill at home. Spouse manages finances. Additional Comments: Pt sleeps in regular flat bed. Objective                                                                                    Goals:  (Update in navigator)   : Long Term Goals  Time Frame for Long Term Goals : 10 tx days:  Long Term Goal 1: Pt will complete bed mobility (scooting, rolling R/L, and sup<->sit) Ind.  Long Term Goal 2: Pt will complete OOB transfers using 2WW/rollator Mod Ind. Long Term Goal 3: Pt will ambulate 10 ft and 50 ft with turns over level  surface using 2WW Mod Ind. Long Term Goal 4: Pt will ambulate >/=150 ft over level surface and 10 ft of uneven surface using rollator with SBA-Sup. Long Term Goal 5: Pt will ascend/descend curb step (2\"/4\" step height) and 4-5 steps using railings with Min A. Additional Goals?: Yes  Long term goal 6: Pt will complete object retrieval from the floor using 2WW/rollator and reacher Mod Ind.:        Plan of Care                                                                              Times per week: 5 days per week for a minimum of 60 minutes/day plus group as appropriate for 60 minutes.   Treatment to include Current Treatment Recommendations: Strengthening, ROM, Balance training, Functional mobility training, Transfer training, Endurance training, Gait training, Stair training, Pain management, Home exercise program, Safety education & training, Patient/Caregiver education & training, Equipment evaluation, education, & procurement, Positioning, Therapeutic activities    Electronically signed by   Brittani Garza PT  1/27/2023, 10:55 AM

## 2023-01-28 PROCEDURE — 94150 VITAL CAPACITY TEST: CPT

## 2023-01-28 PROCEDURE — 97110 THERAPEUTIC EXERCISES: CPT

## 2023-01-28 PROCEDURE — 97530 THERAPEUTIC ACTIVITIES: CPT

## 2023-01-28 PROCEDURE — 94761 N-INVAS EAR/PLS OXIMETRY MLT: CPT

## 2023-01-28 PROCEDURE — 97112 NEUROMUSCULAR REEDUCATION: CPT

## 2023-01-28 PROCEDURE — 1280000000 HC REHAB R&B

## 2023-01-28 PROCEDURE — 97535 SELF CARE MNGMENT TRAINING: CPT

## 2023-01-28 PROCEDURE — 97116 GAIT TRAINING THERAPY: CPT

## 2023-01-28 PROCEDURE — 6370000000 HC RX 637 (ALT 250 FOR IP): Performed by: STUDENT IN AN ORGANIZED HEALTH CARE EDUCATION/TRAINING PROGRAM

## 2023-01-28 PROCEDURE — 6370000000 HC RX 637 (ALT 250 FOR IP): Performed by: PHYSICAL MEDICINE & REHABILITATION

## 2023-01-28 PROCEDURE — 6360000002 HC RX W HCPCS: Performed by: STUDENT IN AN ORGANIZED HEALTH CARE EDUCATION/TRAINING PROGRAM

## 2023-01-28 RX ADMIN — ACETAMINOPHEN 650 MG: 325 TABLET ORAL at 12:20

## 2023-01-28 RX ADMIN — PANTOPRAZOLE SODIUM 40 MG: 40 TABLET, DELAYED RELEASE ORAL at 05:37

## 2023-01-28 RX ADMIN — AMLODIPINE BESYLATE 5 MG: 5 TABLET ORAL at 08:35

## 2023-01-28 RX ADMIN — ACETAMINOPHEN 650 MG: 325 TABLET ORAL at 08:35

## 2023-01-28 RX ADMIN — ACETAMINOPHEN 650 MG: 325 TABLET ORAL at 17:25

## 2023-01-28 RX ADMIN — OXYCODONE HYDROCHLORIDE 10 MG: 10 TABLET ORAL at 05:37

## 2023-01-28 RX ADMIN — DOCUSATE SODIUM 100 MG: 100 CAPSULE, LIQUID FILLED ORAL at 08:35

## 2023-01-28 RX ADMIN — SENNOSIDES 8.6 MG: 8.6 TABLET, COATED ORAL at 21:11

## 2023-01-28 RX ADMIN — ACETAMINOPHEN 650 MG: 325 TABLET ORAL at 21:11

## 2023-01-28 RX ADMIN — ENOXAPARIN SODIUM 40 MG: 100 INJECTION SUBCUTANEOUS at 21:12

## 2023-01-28 RX ADMIN — OXYCODONE HYDROCHLORIDE 5 MG: 5 TABLET ORAL at 22:51

## 2023-01-28 ASSESSMENT — PAIN DESCRIPTION - LOCATION
LOCATION: BACK
LOCATION: RIB CAGE
LOCATION: RIB CAGE

## 2023-01-28 ASSESSMENT — PAIN SCALES - GENERAL
PAINLEVEL_OUTOF10: 0
PAINLEVEL_OUTOF10: 0
PAINLEVEL_OUTOF10: 3
PAINLEVEL_OUTOF10: 0
PAINLEVEL_OUTOF10: 3
PAINLEVEL_OUTOF10: 0
PAINLEVEL_OUTOF10: 3
PAINLEVEL_OUTOF10: 6
PAINLEVEL_OUTOF10: 3

## 2023-01-28 ASSESSMENT — PAIN DESCRIPTION - ORIENTATION
ORIENTATION: LEFT
ORIENTATION: LEFT;RIGHT
ORIENTATION: RIGHT;LEFT
ORIENTATION: RIGHT;LEFT;MID
ORIENTATION: LEFT;RIGHT

## 2023-01-28 ASSESSMENT — PAIN SCALES - WONG BAKER
WONGBAKER_NUMERICALRESPONSE: 0
WONGBAKER_NUMERICALRESPONSE: 2;4
WONGBAKER_NUMERICALRESPONSE: 0

## 2023-01-28 ASSESSMENT — PAIN DESCRIPTION - DESCRIPTORS
DESCRIPTORS: SHARP
DESCRIPTORS: SHARP
DESCRIPTORS: ACHING
DESCRIPTORS: DISCOMFORT
DESCRIPTORS: SHARP

## 2023-01-28 ASSESSMENT — PAIN DESCRIPTION - PAIN TYPE: TYPE: CHRONIC PAIN

## 2023-01-28 ASSESSMENT — PAIN DESCRIPTION - FREQUENCY: FREQUENCY: CONTINUOUS

## 2023-01-28 ASSESSMENT — PAIN DESCRIPTION - ONSET: ONSET: ON-GOING

## 2023-01-28 NOTE — PROGRESS NOTES
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]       Date 2023       Day of ARU Week:  6   Time IN//1030; 1300/1400   Individual Tx Minutes 60+60   Group Tx Minutes    Co-Treat Minutes    Concurrent Tx Minutes    TOTAL Tx Time Mins 120   Variance Time    Variance Time []   Refusal due to:     []   Medical hold/reason:    []   Illness   []   Off Unit for test/procedure  []   Extra time needed to complete task  []   Therapeutic need  []   Other (specify):   Restrictions Restrictions/Precautions: Fall Risk, General Precautions, Weight Bearing         Communication with other providers: [x]   OK to see per nursing:     []   Spoke with team member regarding:      Subjective observations and cognitive status: Pt in recliner upon therapist arrival. PT passes patient to therapist. Pt states she is tired but agreeable to therapy    PM: Pt in semi-fowlers upon therapist arrival. Patient pleasant and agreeable to therapy     Pain level/location:   2 /10       Location: L ribs   Discharge recommendations  Anticipated discharge date:    Destination: []home alone   []home alone w assist prn   [x] home w/ family    [] Continuous supervision       []SNF    [] Assisted living     [] Other:   Continued therapy: [x]HHC OT  []OUTPATIENT  OT   [] No Further OT  Equipment needs: none       ADLs:    Eating: Eating  Assistance Needed: Independent  Comment: X  CARE Score: 6  Discharge Goal: Set-up or clean-up assistance       Oral Hygiene: Oral Hygiene  Assistance Needed: Independent  Comment: X  CARE Score: 6  Discharge Goal: Independent    UB Dressing: Upper Body Dressing  Assistance Needed: Independent  Comment: X  CARE Score: 6  Discharge Goal: Independent         LB Dressing: Lower Body Dressing  Assistance Needed: Independent  Comment: Mod I- uses unilateral support in stance at sink to increase balance when donning pants over hips  CARE Score: 6  Discharge Goal: Independent    Donning and [place] Footwear: Putting On/Taking Off Footwear  Assistance Needed: Partial/moderate assistance  Comment: requires assistance to tie shoes. Can don/doff shoes Ind  CARE Score: 3  Discharge Goal: Independent      Toileting: Toileting Hygiene  Assistance needed: Independent  Comment: Mod I- uses grab bars in stance to manage pants over hips  CARE Score: 6  Discharge Goal: Independent      Toilet Transfers: Toilet Transfer  Assistance needed: Independent  Comment: Uses toilet features during tx  CARE Score: 6  Discharge Goal: Independent  Device Used:    []   Standard Toilet         [x]   Grab Bars           [x]  Bedside Commode       []   Elevated Toilet          []   Other:        Bed Mobility:           [x]   Pt received out of bed   Rolling R/L:  Mod I  Scooting: Mod I  Supine --> Sit:   Sit --> Supine:  Sup    Transfers:    Sit--> Stand: Mod I  Stand --> Sit:   Mod I  Stand-Pivot:   Sup  Other:    Assistive device required for transfer:   RW      Functional Mobility:  Around room, bathroom and ARU gym/hallway  Assistance:  Sup  Device:   [x]   Rolling Walker     []   Standard Walker []   Wheelchair        []   Fausto Areola       []   4-Wheeled Anabela Roberto         []   Cardiac Anabela Roberto       []   Other:        Homemaking Tasks:   Pt retrieves clothing items from closet and transports to bathroom via RW      Additional Therapeutic activities/exercises completed this date:     [x]   ADL Training- Pt completed kitchen management activity to retrieve items around the kitchen in high and low drawers to increase walker management, safety, balance, endurance, and activity tolerance. Pt completed activity requiring vc for walker and hand placement.  Pt completed activity requiring 1 rest break. [x]   Balance/Postural training     []   Bed/Transfer Training   [x]   Endurance Training: PM: PT participated in scavenger hunt activity to retreive cones around therapy gym reaching for cones from high and low surfaces to increase activity tolerance, balance, endurance, and reaching outside [place]. Pt completed activity with 1 seated rest break throughout activity. Pt educated on EC and reaching for stable surfaces when reaching for high items. []   Neuromuscular Re-ed   []   Nu-step:  Time:        Level:         #Steps:       [x]   Rebounder:    []  Seated     [x]  Standing: AM: Pt completed rebounder activity standing for 2 reps of 10 to increase standing tolerance, activity tolerance, endurance, balance, bilateral coordination, and motor planning. Pt completed activity requiring 2 verbal cues for feet positioning and balance when reaching outside [place]. Pt required 30 second rest break while standing after 10 reps. Pt instructed on breathing techniques and EC. SBA during activity to increase safety. []   Supine Ther Ex (reps/sets):     [x]   Seated Ther Ex (reps/sets):  Pt participated in HEP using Moderate to light theraband to increase UE strength, bilateral coordination, activity tolerance, endurance. Pt completed 2 sets x10 reps for 5 exercises. Pt required 3 rest breaks throughout. Pt educated on ec and proper technique. []   Standing Ther Ex (reps/sets):     []   Other:      Comments:      Patient/Caregiver Education and Training:   []   Adaptive Equipment Use  [x]   Bed Mobility/Transfer Technique/Safety  [x]   Energy Conservation Tips  []   Family training  [x]   Postural Awareness  [x]   Safety During Functional Activities  []   Reinforced Patient's Precautions   []   Progress was updated and reviewed in Rehabtracker with patient and/or family this         date.     Treatment Plan for Next Session: POC to continue as tolerated       Treatment/Activity Tolerance:   [x] Tolerated treatment with no adverse effects    [] Patient limited by fatigue  [] Patient limited by pain   [] Patient limited by medical complications:    [] Adverse reaction to Tx:   [] Significant change in status    Safety:       [x]  bed alarm set    []  chair alarm set    []  Pt refused alarms                []  Telesitter activated      [x]  Gait belt used during tx session      []other:       Number of Minutes/Billable Intervention  Therapeutic Exercise 30+30   ADL Self-care 30+15   Neuro Re-Ed    Therapeutic Activity 15   Group    Other:    TOTAL 120       Social History  Social/Functional History  Lives With: Spouse Joel Rosa- in good health and can help PRN)  Type of Home: House  Home Layout: Multi-level (Tri-level 7 steps upstairs - however currently installing chair lift)  Home Access: Stairs to enter without rails  Entrance Stairs - Number of Steps: 2 ANNIE - Does have handle attached to the door of the home that she uses  Bathroom Shower/Tub: Tub/Shower unit  Bathroom Toilet: Standard  Bathroom Equipment: Shower chair, Grab bars in shower, 3-in-1 commode  Bathroom Accessibility: Walker accessible  Home Equipment: Cane, Rollator, Reacher, Walker, rolling  Has the patient had two or more falls in the past year or any fall with injury in the past year?: Yes (> 10 falls in the last year)  Receives Help From: Family  ADL Assistance: Independent  Homemaking Responsibilities: Yes  Meal Prep Responsibility: Secondary (Pt cooks 1-2 times a week.)  Laundry Responsibility: No ( completes.)  Cleaning Responsibility: No ( completes.)  Bill Paying/Finance Responsibility: No ( completes.)  Shopping Responsibility: Secondary (Pt states \"I go with him ~ 30% of the time\".   completes mostly.)  Dependent Care Responsibility: No  Health Care Management: Primary  Ambulation Assistance: Independent (Pt uses 2WW at night to go in and out of the bathroom, sometimes in the kitchen and states she uses the walker to help her get out of chairs. 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 Pt has cataracts. No pets at home but loves animals. Pt uses pill box for 1 pill at home. Spouse manages finances. Additional Comments: Pt sleeps in regular flat bed. Objective                                                                                    Goals:  (Update in navigator)  Short Term Goals  Time Frame for Short Term Goals: STGs=LTGs:  Long Term Goals  Time Frame for Long Term Goals : 10-12 days or until d/c. Long Term Goal 1: Pt will complete oral hygiene and grooming tasks with IND. Long Term Goal 2: Pt will complete total body bathing with AE PRN and Mod I.  Long Term Goal 3: Pt will complete UB dressing with IND. Long Term Goal 4: Pt will complete LB dressing with AE PRN and Mod I.  Long Term Goal 5: Pt will doff/don footwear with AE PRN and Mod I. Additional Goals?: Yes  Long Term Goal 6: Pt will complete toileting with IND. Long Term Goal 7: Pt will complete functional transfers (bed, chair, toilet, shower) with DME PRN and Mod I.  Long Term Goal 8: Pt will perform therex/therax to facilitate an increase in strength/endurance with emphasis on dynamic standing balance/tolerance > 8 mins with Mod I.  Long Term Goal 9: Pt will perform meal prep tasks with Mod I.:        Plan of Care                                                                              Times per week: 5 days per week for a minimum of 60 minutes/day plus group as appropriate for 60 minutes.   Treatment to include Occupational Therapy Plan  Current Treatment Recommendations: Strengthening, ROM, Endurance training, Functional mobility training, Safety education & training, Patient/Caregiver education & training, Pain management, Equipment evaluation, education, & procurement, Positioning, Self-Care / ADL, Home management training, Balance training, Coordination training    Electronically signed by   ARNULFO Levi,  1/28/2023, 10:11 AM

## 2023-01-28 NOTE — PROGRESS NOTES
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 Alina Griffith, Pt can either wear postop shoe to bilateral feet or regular shoe as tolerated. Pt has 2 L O2 via NC. Communication with other providers: [x]   OK to see per nursing:     []   Spoke with team member regarding:      Subjective observations and cognitive status: Upon arrival pt long sitting in bed with nurse present receiving medication.  Pt alert and oriented and agreeable to tx     Pain level/location:    1/10       Location: L side of ribs   Discharge recommendations  Anticipated discharge date:   2023  Destination: []home alone   []home alone with assist PRN     [x] home w/ family      [] Continuous supervision  []SNF    [] Assisted living     [] Other:  Continued therapy: [x]C PT  []OUTPATIENT PT   [] No Further PT  []SNF PT  Caregiver training recommended: []Yes  [] No   Equipment needs: has 2WW and rollator          Bed Mobility:           []   Pt received out of bed   Rolling R/L:  MI  Scooting:  MI  Lying --> Sit:  MI  Sit --> lying:  MI    Transfers:    Sit--> Stand:  Supervision  Stand --> Sit: Supervision  Chair-->Bed/Bed --> Chair:   Supervision      Assistive device required for transfer:   RW    Gait:    Distance:  610 ft and 75 ft   Assistance:  SBA with close w/c follow  Device:  RW  Gait Quality:  Pt demo decrease stance time on RLE with a step through pattern          Stairs   # Completed:  5  Assistance:  CGA  Supportive Device:  B HR  Pt demo increase anxiety with steps at end of flores due to unable to  rails and felt comfortable with steps in gym          Additional Therapeutic activities/exercises completed this date:     []   Nu-step:  Time:        Level:         #Steps:       []   Rebounder:    []  Seated     []  Standing        [x]   Balance training Instructed in dynamic standing with BUE support with alternating SLS in order to increase wb through BLE to improve quality of gait        []   Postural training    []   Supine ther ex (reps/sets):     []   Seated ther ex (reps/sets):     [x]   Standing ther ex (reps/sets):  Hip abd, hip flexion and HS curls x 15 reps   []   Picking up object from floor (standing):                   []   Reacher used   []   Other:   []   Other:    Comments:      Patient/Caregiver Education and Training:   [x]   Bed Mobility/Transfer technique/safety  [x]   Gait technique/sequencing  [x]   Proper use of assistive device  []   Advanced mobility safety and technique  []   Reinforced patient's precautions/mobility while maintaining precautions  []   Postural awareness  []   Family training  []   Progress was updated and reviewed in Rehabtracker with patient and/or family this date. Treatment Plan for Next Session: Continue with POC      Assessment: This pt demonstrated a positive  response to today's treatment as evidenced by mobility. The patient is making great progress toward established goals as evidenced by QI scores. Ongoing deficits are observed in the areas of mobility and continued focus on strength is recommended.      Treatment/Activity Tolerance:   [x] Tolerated treatment with no adverse effects    [] Patient limited by fatigue  [] Patient limited by pain   [] Patient limited by medical complications:    [] Adverse reaction to Tx:   [] Significant change in status    Safety:       []  bed alarm set    [x]  chair alarm set    []  Pt refused alarms                []  Telesitter activated      [x]  Gait belt used during tx session      []other:         Number of Minutes/Billable Intervention  Gait Training 15   Therapeutic Exercise 15   Neuro Re-Ed 15   Therapeutic Activity 15   Wheelchair Propulsion    Group    Other:    TOTAL 60         Social History  Social/Functional History  Lives With: Spouse Pasha Carter- in good health and can help PRN)  Type of Home: House  Home Layout: Multi-level (Tri-level 7 steps upstairs - however currently installing chair lift)  Home Access: Stairs to enter without rails  Entrance Stairs - Number of Steps: 2 ANNIE - Does have handle attached to the door of the home that she uses  Bathroom Shower/Tub: Tub/Shower unit  Bathroom Toilet: Standard  Bathroom Equipment: Shower chair, Grab bars in shower, 3-in-1 commode  Bathroom Accessibility: Walker accessible  Home Equipment: Cane, [address], Walker, rolling  Has the patient had two or more falls in the past year or any fall with injury in the past year?: Yes (> 10 falls in the last year)  Receives Help From: Family  ADL Assistance: Independent  Homemaking Responsibilities: Yes  Meal Prep Responsibility: Secondary (Pt cooks 1-2 times a week.)  Laundry Responsibility: No ( completes.)  Cleaning Responsibility: No ( completes.)  Bill Paying/Finance Responsibility: No ( completes.)  Shopping Responsibility: Secondary (Pt states \"I go with him ~ 30% of the time\".   completes mostly.)  Dependent Care Responsibility: No  Health Care Management: Primary  Ambulation Assistance: Independent (Pt uses 2WW at night to go in and out of the bathroom, sometimes in the kitchen and states she uses the walker to help her get out of chairs. 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 Pt has cataracts. No pets at home but loves animals. Pt uses pill box for 1 pill at home. Spouse manages finances. Additional Comments: Pt sleeps in regular flat bed. Objective                                                                                    Goals:  (Update in navigator)   : Long Term Goals  Time Frame for Long Term Goals : 10 tx days:  Long Term Goal 1: Pt will complete bed mobility (scooting, rolling R/L, and sup<->sit) Ind.  Long Term Goal 2: Pt will complete OOB transfers using 2WW/rollator Mod Ind. Long Term Goal 3: Pt will ambulate 10 ft and 50 ft with turns over level  surface using 2WW Mod Ind. Long Term Goal 4: Pt will ambulate >/=150 ft over level surface and 10 ft of uneven surface using rollator with SBA-Sup. Long Term Goal 5: Pt will ascend/descend curb step (2\"/4\" step height) and 4-5 steps using railings with Min A. Additional Goals?: Yes  Long term goal 6: Pt will complete object retrieval from the floor using 2WW/rollator and reacher Mod Ind.:        Plan of Care                                                                              Times per week: 5 days per week for a minimum of 60 minutes/day plus group as appropriate for 60 minutes.   Treatment to include Current Treatment Recommendations: Strengthening, ROM, Balance training, Functional mobility training, Transfer training, Endurance training, Gait training, Stair training, Pain management, Home exercise program, Safety education & training, Patient/Caregiver education & training, Equipment evaluation, education, & procurement, Positioning, Therapeutic activities    Electronically signed by   Jennifer Arellano, [place], [id number]  1/28/2023, 8:31 AM

## 2023-01-28 NOTE — PROGRESS NOTES
Ford Case    : 1950  Acct #: [de-identified]  MRN: [id number]              PM&R Progress Note      Admitting diagnosis: ***    Comorbid diagnoses impacting rehabilitation: ***    Chief complaint: ***    Prior (baseline) level of function: Independent. Current level of function:         Current  IRF-SNOW and Goals:   Occupational Therapy:    Short Term Goals  Time Frame for Short Term Goals: STGs=LTGs :   Long Term Goals  Time Frame for Long Term Goals : 10-12 days or until d/c. Long Term Goal 1: Pt will complete oral hygiene and grooming tasks with IND. Long Term Goal 2: Pt will complete total body bathing with AE PRN and Mod I.  Long Term Goal 3: Pt will complete UB dressing with IND. Long Term Goal 4: Pt will complete LB dressing with AE PRN and Mod I.  Long Term Goal 5: Pt will doff/don footwear with AE PRN and Mod I. Additional Goals?: Yes  Long Term Goal 6: Pt will complete toileting with IND.   Long Term Goal 7: Pt will complete functional transfers (bed, chair, toilet, shower) with DME PRN and Mod I.  Long Term Goal 8: Pt will perform therex/therax to facilitate an increase in strength/endurance with emphasis on dynamic standing balance/tolerance > 8 mins with Mod I.  Long Term Goal 9: Pt will perform meal prep tasks with Mod I. :                                       Eating: Eating  Assistance Needed: Independent  Comment: X  CARE Score: 6  Discharge Goal: Set-up or clean-up assistance       Oral Hygiene: Oral Hygiene  Assistance Needed: Independent  Comment: X  CARE Score: 6  Discharge Goal: Independent    UB/LB Bathing: Shower/Bathe Self  Assistance Needed: Supervision or touching assistance  Comment: Sup seated majority of shower, sup in stance 2* to  patients anxiety  CARE Score: 4  Discharge Goal: Independent    UB Dressing: Upper Body Dressing  Assistance Needed: Independent  Comment: X  CARE Score: 6  Discharge Goal: Independent         LB Dressing: Lower Body Dressing  Assistance Needed: Independent  Comment: Ind using figure four and forward flexion method to thread BLE in pants, able to manage over hips without support  CARE Score: 6  Discharge Goal: Independent    Donning and [place] Footwear: Putting On/Taking Off Footwear  Assistance Needed: Independent  Comment: USes figure four/forward flexion method  CARE Score: 6  Discharge Goal: Independent      Toiletin [address] needed: Independent  Comment: able to manage pants over hips with unilateral support in stance, complete hygiene seated  CARE Score: 6  Discharge Goal: Independent      Toilet Transfers: Toilet Transfer  Assistance needed: Independent  Comment: Mod Ind with rollator and BSC frame over toilet  CARE Score: 6  Discharge Goal: Independent    Physical Therapy:         Long Term Goals  Time Frame for Long Term Goals : 10 tx days:  Long Term Goal 1: Pt will complete bed mobility (scooting, rolling R/L, and sup<->sit) Ind.  Long Term Goal 2: Pt will complete OOB transfers using 2WW/rollator Mod Ind. Long Term Goal 3: Pt will ambulate 10 ft and 50 ft with turns over level  surface using 2WW Mod Ind. Long Term Goal 4: Pt will ambulate >/=150 ft over level surface and 10 ft of uneven surface using rollator with SBA-Sup. Long Term Goal 5: Pt will ascend/descend curb step (2\"/4\" step height) and 4-5 steps using railings with Min A. Additional Goals?: Yes  Long term goal 6: Pt will complete object retrieval from the floor using 2WW/rollator and reacher Mod Ind.       Bed Mobility:   Sit to Lying  Assistance Needed: Supervision or touching assistance  Comment: SBA without use of bed features  CARE Score: 4  Discharge Goal: Independent  Roll Left and Right  Assistance Needed: Supervision or touching assistance  Comment: pt was able to tolerate rolling to R/L today without use of bed features with SBA  Reason if not Attempted: Not attempted due to medical condition or safety concerns  CARE Score: 4  Discharge Goal: Independent  Lying to Sitting on Side of Bed  Assistance Needed: Independent  Comment: Ind without use of bed features  CARE Score: 6  Discharge Goal: Independent    Transfers:    Sit to Stand  Assistance Needed: Independent  Comment: Mod Ind with rollator; pt was more attentive and consistent with management of hand brakes prior to standing; immediate standing balance was consistently steady  CARE Score: 6  Discharge Goal: Independent  Chair/Bed-to-Chair Transfer  Assistance Needed: Supervision or touching assistance  Comment: SBA-Sup with rollator  CARE Score: 4  Discharge Goal: Independent  Toilet Transfer  Assistance needed: Independent  Comment: Mod Ind with rollator and BSC frame over toilet  CARE Score: 6  Car Transfer  Assistance Needed: Supervision or touching assistance  Comment: SBA-Sup with rollator  CARE Score: 4  Discharge Goal: Independent    Ambulation:    Walking Ability  Does the Patient Walk?: Yes     Walk 10 Feet  Assistance Needed: Independent  Comment: Mod Ind using rollator and regular shoes (gait quality consistently steady)  CARE Score: 6  Discharge Goal: Independent     Walk 50 Feet with Two Turns  Assistance Needed: Independent  Comment: Mod Ind using rollator and regular shoes (gait quality consistently steady)  CARE Score: 6  Discharge Goal: Independent     Walk 150 Feet  Assistance Needed: Independent  Comment:  Mod Ind using rollator and regular shoes (gait quality consistently steady); pt was able to ambulate up to 364 ft consecutively  CARE Score: 6  Discharge Goal: Supervision or touching assistance     Walking 10 Feet on Uneven Surfaces  Assistance Needed: Supervision or touching assistance  Comment: CGA using rollator (pt expressed fear of falling that she immediately requested for PT to steady her)  CARE Score: 4  Discharge Goal: Supervision or touching assistance     1 Step (Curb)  Assistance Needed: Partial/moderate assistance  Comment: pt was able to tolerate  2\" curb today using rollator with Min A on ascent (required steadying assist and some assistance to fully lift AD due to impaired strength and balance) and CGA on descent; required sequential cues on stepping pattern due to RLE weakness and increased R foot pain; pt initiated management of hand brakes prior to ascent/descent  Reason if not Attempted: Patient refused  CARE Score: 3  Discharge Goal: Partial/moderate assistance     4 Steps  Assistance Needed: Supervision or touching assistance  Comment: pt progressed to being able to ascend/descend 5 steps (4\"/6\" step heights) with CGA with both hands holding L railing on ascent and railings on descent with step-to pattern leading with LLE on ascent and RLE on descent; pt had to slightly position at an angle on descent due to her impaired strength and balance  Reason if not Attempted: Not attempted due to medical condition or safety concerns  CARE Score: 4  Discharge Goal: Partial/moderate assistance     12 Steps  Comment: pt does not demonstrate potential to progressing to this mobility task due to progressive weakness from underlying IBM  Reason if not Attempted: Not attempted due to medical condition or safety concerns  CARE Score: 88  Discharge Goal: Not Attempted       Wheelchair:  w/c Ability: Wheelchair Ability  Uses a Wheelchair and/or Scooter?: No                Balance:        Object: Picking Up Object  Assistance Needed: Partial/moderate assistance  Comment: Min A using rollator and reacher; required stabilization of AD as pt performed required task as pt did not manage hand brakes; pt used R hand to manipulate reacher due to chronic L hand limitation  CARE Score: 3  Discharge Goal: Independent    I      Exam:    Blood pressure (!) 156/80, pulse 80, temperature 97.6 °F (36.4 °C), temperature source Oral, resp. rate 18, height 5' 4\" (1.626 m), weight 126 lb 12.2 oz (57.5 kg), SpO2 91 %.     General: ***    HEENT: ***    Pulmonary: ***    Cardiac: Independent    I      Exam:    Blood pressure (!) 156/80, pulse 80, temperature 97.6 °F (36.4 °C), temperature source Oral, resp. rate 18, height 5' 4\" (1.626 m), weight 126 lb 12.2 oz (57.5 kg), SpO2 91 %. General: Sitting up in bed. Quiet. Passive in conversation. Following directions. HEENT: Neck supple. No JVD or adenopathy. Full visual field. Clear speech. Pulmonary: Unlabored with symmetric air exchange. Cardiac: Regular rate and rhythm. Abdomen: Patient's abdomen is soft and nondistended. Bowel sounds were present throughout. There was no rebound, guarding or masses noted. Upper extremities: She was able to bring both hands up to meet mine. Weak , particularly on the left. No new swelling. Lower extremities: Calves soft. Heels clear. 3+/5 strength muscle left ankle and 4 -/5 strength at the right ankle    Sitting balance was good. Standing balance was fair-.    Lab Results   Component Value Date    WBC 3.6 (L) 01/26/2023    HGB 11.0 (L) 01/26/2023    HCT 33.1 (L) 01/26/2023    MCV 94.3 01/26/2023     01/26/2023     No results found for: INR, PROTIME  Lab Results   Component Value Date    CREATININE 0.7 01/26/2023    BUN 18 01/26/2023     (L) 01/26/2023    K 3.9 01/26/2023    CL 99 01/26/2023    CO2 29 01/26/2023     Lab Results   Component Value Date    ALT 20 01/21/2023    AST 23 01/21/2023    ALKPHOS 67 01/21/2023    BILITOT 0.7 01/21/2023       Expected length of stay  prior to a supervised level of function for discharge home with a walker and [facility] OT/PT is 2/1/2023. 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 Poor insight and some learned bad habits are still impacting her safety with transfers and self-care. No GI side effects with her current medicines.   Continuing with aggressive pulmonary hygiene measures and nutritional support. Continuing with adaptive equipment training, caregiver education and bowel oral medications. Generally continent of bladder. Outpatient follow-up with her neurologist and PCP following rehab. Verbal cues and CGA-minimum physical assistance for transfers. DVT prophylaxis: Lovenox 40 mg subcu daily is being used cautiously concerning her pancytopenia. Serial blood counts and monitoring closely for signs of bleeding complications. Weightbearing activities have been somewhat limited but still tried daily. GI prophylaxis. No new bruising or swelling. Uncontrolled pain: She is tolerating the acetaminophen and oxycodone for her pain related to her rib fractures, toe fractures and myositis. Tolerating her scheduled acetaminophen. Diathermy and frequent repositioning are available. Bowel intervention while on the analgesics has been productive. Dysphagia: A soft and bite-size diet is required at this point. Speech-language pathology is working on swallowing technique recovery. Upright for meds and meals. Thin liquids have been tolerated reasonably well. Acute kidney injury: Minimizing fluctuations of blood pressure and the use of nephrotoxic medications. Serial chemistries to monitor renal function and the status of her hypokalemia. Supplementing her potassium as needed. Encouraging consistent oral hydration. Hemoglobin 11.0. Low normal white count. .  Rib and toe fractures: Weightbearing as tolerated. Pain management. Incentive spirometry. Nocturnal oxygen desaturations noted. Will increase ventilation with nebulizers and encourage spirometry use. Hypertension: Norvasc is required to control her systolic blood pressure. Target systolic blood pressure ranges 120-140. Vital signs are checked at rest and with activity. Her blood pressure is just above the target range again today. Monitoring closely. IBS: Regular bowel intervention with Colace and Senokot.   [address] [facility] [employer] choices encouraged.  Zofran as needed.

## 2023-01-29 VITALS
BODY MASS INDEX: 21.57 KG/M2 | HEART RATE: 83 BPM | WEIGHT: 126.32 LBS | DIASTOLIC BLOOD PRESSURE: 92 MMHG | HEIGHT: 64 IN | TEMPERATURE: 97.7 F | RESPIRATION RATE: 16 BRPM | OXYGEN SATURATION: 94 % | SYSTOLIC BLOOD PRESSURE: 148 MMHG

## 2023-01-29 LAB — GLUCOSE BLD-MCNC: 113 MG/DL (ref 70–99)

## 2023-01-29 PROCEDURE — 82962 GLUCOSE BLOOD TEST: CPT

## 2023-01-29 PROCEDURE — 94150 VITAL CAPACITY TEST: CPT

## 2023-01-29 PROCEDURE — 6370000000 HC RX 637 (ALT 250 FOR IP): Performed by: STUDENT IN AN ORGANIZED HEALTH CARE EDUCATION/TRAINING PROGRAM

## 2023-01-29 PROCEDURE — 6370000000 HC RX 637 (ALT 250 FOR IP): Performed by: PHYSICAL MEDICINE & REHABILITATION

## 2023-01-29 PROCEDURE — 6360000002 HC RX W HCPCS: Performed by: STUDENT IN AN ORGANIZED HEALTH CARE EDUCATION/TRAINING PROGRAM

## 2023-01-29 PROCEDURE — 1280000000 HC REHAB R&B

## 2023-01-29 PROCEDURE — 94761 N-INVAS EAR/PLS OXIMETRY MLT: CPT

## 2023-01-29 RX ADMIN — DOCUSATE SODIUM 100 MG: 100 CAPSULE, LIQUID FILLED ORAL at 08:57

## 2023-01-29 RX ADMIN — ENOXAPARIN SODIUM 40 MG: 100 INJECTION SUBCUTANEOUS at 20:39

## 2023-01-29 RX ADMIN — ACETAMINOPHEN 650 MG: 325 TABLET ORAL at 20:39

## 2023-01-29 RX ADMIN — SENNOSIDES 8.6 MG: 8.6 TABLET, COATED ORAL at 20:39

## 2023-01-29 RX ADMIN — ACETAMINOPHEN 650 MG: 325 TABLET ORAL at 12:16

## 2023-01-29 RX ADMIN — ACETAMINOPHEN 650 MG: 325 TABLET ORAL at 08:57

## 2023-01-29 RX ADMIN — AMLODIPINE BESYLATE 5 MG: 5 TABLET ORAL at 08:57

## 2023-01-29 RX ADMIN — PANTOPRAZOLE SODIUM 40 MG: 40 TABLET, DELAYED RELEASE ORAL at 06:04

## 2023-01-29 RX ADMIN — ACETAMINOPHEN 650 MG: 325 TABLET ORAL at 17:13

## 2023-01-29 ASSESSMENT — PAIN SCALES - GENERAL
PAINLEVEL_OUTOF10: 0
PAINLEVEL_OUTOF10: 0

## 2023-01-29 ASSESSMENT — PAIN SCALES - WONG BAKER: WONGBAKER_NUMERICALRESPONSE: 0

## 2023-01-30 PROCEDURE — 94761 N-INVAS EAR/PLS OXIMETRY MLT: CPT

## 2023-01-30 PROCEDURE — 1280000000 HC REHAB R&B

## 2023-01-30 PROCEDURE — 6370000000 HC RX 637 (ALT 250 FOR IP): Performed by: STUDENT IN AN ORGANIZED HEALTH CARE EDUCATION/TRAINING PROGRAM

## 2023-01-30 PROCEDURE — 94150 VITAL CAPACITY TEST: CPT

## 2023-01-30 PROCEDURE — 97530 THERAPEUTIC ACTIVITIES: CPT

## 2023-01-30 PROCEDURE — 6360000002 HC RX W HCPCS: Performed by: STUDENT IN AN ORGANIZED HEALTH CARE EDUCATION/TRAINING PROGRAM

## 2023-01-30 PROCEDURE — 99232 SBSQ HOSP IP/OBS MODERATE 35: CPT | Performed by: PHYSICAL MEDICINE & REHABILITATION

## 2023-01-30 PROCEDURE — 97110 THERAPEUTIC EXERCISES: CPT

## 2023-01-30 PROCEDURE — 6370000000 HC RX 637 (ALT 250 FOR IP): Performed by: PHYSICAL MEDICINE & REHABILITATION

## 2023-01-30 PROCEDURE — 97116 GAIT TRAINING THERAPY: CPT

## 2023-01-30 PROCEDURE — 97535 SELF CARE MNGMENT TRAINING: CPT

## 2023-01-30 RX ADMIN — SENNOSIDES 8.6 MG: 8.6 TABLET, COATED ORAL at 20:32

## 2023-01-30 RX ADMIN — OXYCODONE HYDROCHLORIDE 10 MG: 10 TABLET ORAL at 00:08

## 2023-01-30 RX ADMIN — DOCUSATE SODIUM 100 MG: 100 CAPSULE, LIQUID FILLED ORAL at 09:08

## 2023-01-30 RX ADMIN — PANTOPRAZOLE SODIUM 40 MG: 40 TABLET, DELAYED RELEASE ORAL at 06:38

## 2023-01-30 RX ADMIN — Medication 3 MG: at 00:08

## 2023-01-30 RX ADMIN — Medication 3 MG: at 23:27

## 2023-01-30 RX ADMIN — AMLODIPINE BESYLATE 5 MG: 5 TABLET ORAL at 09:07

## 2023-01-30 RX ADMIN — ACETAMINOPHEN 650 MG: 325 TABLET ORAL at 18:16

## 2023-01-30 RX ADMIN — ACETAMINOPHEN 650 MG: 325 TABLET ORAL at 13:05

## 2023-01-30 RX ADMIN — ACETAMINOPHEN 650 MG: 325 TABLET ORAL at 09:06

## 2023-01-30 RX ADMIN — ENOXAPARIN SODIUM 40 MG: 100 INJECTION SUBCUTANEOUS at 20:32

## 2023-01-30 ASSESSMENT — PAIN DESCRIPTION - ORIENTATION
ORIENTATION: LEFT
ORIENTATION: RIGHT;LEFT
ORIENTATION: LEFT
ORIENTATION: LEFT

## 2023-01-30 ASSESSMENT — PAIN SCALES - GENERAL
PAINLEVEL_OUTOF10: 2
PAINLEVEL_OUTOF10: 0
PAINLEVEL_OUTOF10: 2
PAINLEVEL_OUTOF10: 6
PAINLEVEL_OUTOF10: 2
PAINLEVEL_OUTOF10: 0

## 2023-01-30 ASSESSMENT — PAIN SCALES - WONG BAKER
WONGBAKER_NUMERICALRESPONSE: 6
WONGBAKER_NUMERICALRESPONSE: 0

## 2023-01-30 ASSESSMENT — PAIN DESCRIPTION - PAIN TYPE: TYPE: CHRONIC PAIN

## 2023-01-30 ASSESSMENT — PAIN - FUNCTIONAL ASSESSMENT
PAIN_FUNCTIONAL_ASSESSMENT: PREVENTS OR INTERFERES SOME ACTIVE ACTIVITIES AND ADLS
PAIN_FUNCTIONAL_ASSESSMENT: ACTIVITIES ARE NOT PREVENTED
PAIN_FUNCTIONAL_ASSESSMENT: PREVENTS OR INTERFERES SOME ACTIVE ACTIVITIES AND ADLS
PAIN_FUNCTIONAL_ASSESSMENT: PREVENTS OR INTERFERES SOME ACTIVE ACTIVITIES AND ADLS

## 2023-01-30 ASSESSMENT — PAIN DESCRIPTION - DESCRIPTORS
DESCRIPTORS: DISCOMFORT;ACHING
DESCRIPTORS: SHARP

## 2023-01-30 ASSESSMENT — PAIN DESCRIPTION - FREQUENCY: FREQUENCY: INTERMITTENT

## 2023-01-30 ASSESSMENT — PAIN DESCRIPTION - LOCATION
LOCATION: RIB CAGE
LOCATION: BACK
LOCATION: RIB CAGE
LOCATION: RIB CAGE

## 2023-01-30 ASSESSMENT — PAIN DESCRIPTION - ONSET: ONSET: ON-GOING

## 2023-01-30 NOTE — PROGRESS NOTES
01/30/23 7176   Encounter Summary   Encounter Overview/Reason  Volunteer Encounter   Service Provided For: Patient   Referral/Consult From: Volunteer   Support System Spouse   Last Encounter  01/29/23  (Patient received communion from volunteer)   Complexity of Encounter Low   Begin Time 1130   End Time  1135   Total Time Calculated 5 min   Encounter    Type Follow up   Spiritual/Emotional needs   Type Spiritual Support   Rituals, Rites and Sacraments   Type Denominational Communion   Assessment/Intervention/Outcome   Assessment Peaceful   Intervention Prayer (assurance of)/[place];Sustaining Presence/Ministry of presence   Outcome Expressed Gratitude   Plan and Referrals   Plan/Referrals Continue to visit, (comment)

## 2023-01-30 NOTE — CARE COORDINATION
Case mgt met with patient in room. She's looking forward to dc home tomorrow and reports her spouse will provide dc transport. IMM reviewed and hard copy provided. Patient selected [facility]. Patient is off o2. Case mgt attempted to make [facility] referral.  Agency is permanently closed.

## 2023-01-30 NOTE — FLOWSHEET NOTE
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 Philip Madden, Pt can either wear postop shoe to bilateral feet or regular shoe as tolerated. Pt has 2 L O2 via NC. Communication with other providers: [x]   OK to see per nursing:     []   Spoke with team member regarding:      Subjective observations and cognitive status: Pt in upright recliner with legs elevated at start of therapy for both AM and PM sessions. Agreeable to therapy for both AM and PM sessions. Pain level/location:   2 /10       Location: L thoracic over rib fx   Discharge recommendations  Anticipated discharge date:   2023  Destination: []home alone   []home alone with assist PRN     [x] home w/ family      [] Continuous supervision  []SNF    [] Assisted living     [] Other:  Continued therapy: [x]HHC PT  []OUTPATIENT PT   [] No Further PT  []SNF PT  Caregiver training recommended: []Yes  [] No   Equipment needs: has 2WW and rollator       [x]   Pt received out of bed     Transfers:    Sit--> Stand: Mod I  Stand --> Sit:   Mod I  Stand pivot transfers: mod I   Assistive device required for transfer:   Rollator    Gait:    Distance:  AM session: 310' + 105'. PM session: 348'   Assistance:  supervision  Device:  rollator  Gait Quality:  short stride length with reciprocal pattern, conscious effort to perform full stance phase sequencing.        Stairs (AM session)   # Completed:  12  Assistance: CGA  Supportive Device:  one rail    Uneven Surfaces:  (AM session)  (4 trials)   Assistance:    SBA  Device:    rollator  Surfaces Completed:   [x]  Green mat with bean bags beneath      []  Throw rugs       []  Ramp       []  Outdoor pavements        []  Grass             []  Loose gravel        []  Other:      AM session: 4 trials: pt amb up/down 4\" curb CGA with rollator. Vcs for proper locking of rollator brakes and proper positioning. Additional Therapeutic activities/exercises completed this date:     [x]   Nu-step: AM session Time: 5 minutes (for endurance)       Level:   1  (for strengthening and endurance training)      [x]   Rebounder: toss and catch blue non-weight ball x 3 minutes CGA  []  Seated     [x]  Standing (for balance training)       []   Balance training         []   Postural training    []   Supine ther ex (reps/sets):     [x]   Seated ther ex (reps/sets): Heel raise x 10, Toe raise x 10; seated abduction with green thera-band resist x 10 reps; hip adduction isometric ball squeezes x 10 reps (for strengthening)    [x]   Standing ther ex (reps/sets): AM session:    // bars: walk fwd x 30' SBA, walk backwards x 30' SBA, lateral side steping L and R x 20' each SBA, staggered stance L LE then R LE forward x 30 sec each CGA, modified tandem stance one foot on floor and one on bosu ball x 30 sec each CGA. (For balance training)  Holding onto 2 rails toe taps 4\" step x 10 reps each foot; 6\" step x 10 reps each foot (for stairs training)  PM session: // Picking up cones from ground x 20' SBA, Toe taps on cones x 20' SBA. [x]   Picking up object from floor (standing): AM session: SBA with RW                  [x]   Reacher used   []   Other:   []   Other:    Comments: Pt demonstrated increase confidence in // bar activities c limited UE support in static balance activities. Pt continues to express fear of stairs and curbs.      Patient/Caregiver Education and Training:   [x]   Bed Mobility/Transfer technique/safety  [x]   Gait technique/sequencing  [x]   Proper use of assistive device  [x]   Advanced mobility safety and technique  []   Reinforced patient's precautions/mobility while maintaining precautions  [x]   Postural awareness  []   Family training    Treatment Plan for Next Session: Gait training, Stair and curb training, uneven surface such as ramp for confidence. Static and Dynamic balance activities. Assessment: This pt demonstrated a positive response to today's treatment as evidenced by increase in confidence to complete stairs and curbs. Ongoing deficits are observed in the areas of balance and continued focus on static and dynamic balance activities is recommended. Treatment/Activity Tolerance:   [x] Tolerated treatment with no adverse effects    [] Patient limited by fatigue  [] Patient limited by pain   [] Patient limited by medical complications:    [] Adverse reaction to Tx:   [] Significant change in status    Safety:       []  bed alarm set    [x]  chair alarm set    []  Pt refused alarms                []  Telesitter activated      [x]  Gait belt used during tx session: placed axillary level for comfort d/t rib fx. [x]other:  After AM and PM sessions: pt left sitting up in recliner with call light at end of treatment.         Number of Minutes/Billable Intervention  Gait Training 45   Therapeutic Exercise 30   Neuro Re-Ed    Therapeutic Activity 45   Wheelchair Propulsion    Group    Other:    TOTAL 120         Social History  Social/Functional History  Lives With: Spouse Arabella Ambrocio- in good health and can help PRN)  Type of Home: House  Home Layout: Multi-level (Tri-level 7 steps upstairs - however currently installing chair lift)  Home Access: Stairs to enter without rails  Entrance Stairs - Number of Steps: 2 ANNIE - Does have handle attached to the door of the home that she uses  Bathroom Shower/Tub: Tub/Shower unit  Bathroom Toilet: Standard  Bathroom Equipment: Shower chair, Grab bars in shower, 3-in-1 commode  Bathroom Accessibility: Walker accessible  Home Equipment: Cane, Rollator, Reacher, Walker, rolling  Has the patient had two or more falls in the past year or any fall with injury in the past year?: Yes (> 10 falls in the last year)  Receives Help From: Family  ADL Assistance: Independent  Homemaking Responsibilities: Yes  Meal Prep Responsibility: Secondary (Pt cooks 1-2 times a week.)  Laundry Responsibility: No ( completes.)  Cleaning Responsibility: No ( completes.)  Bill Paying/Finance Responsibility: No ( completes.)  Shopping Responsibility: Secondary (Pt states \"I go with him ~ 30% of the time\".  completes mostly.)  Dependent Care Responsibility: No  Health Care Management: Primary  Ambulation Assistance: Independent (Pt uses 2WW at night to go in and out of the bathroom, sometimes in the kitchen and states she uses the walker to help her get out of chairs. 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 Pt has cataracts. No pets at home but loves animals. Pt uses pill box for 1 pill at home. Spouse manages finances. Additional Comments: Pt sleeps in regular flat bed. Objective                                                                                    Goals:  (Update in navigator)   : Long Term Goals  Time Frame for Long Term Goals : 10 tx days:  Long Term Goal 1: Pt will complete bed mobility (scooting, rolling R/L, and sup<->sit) Ind.  Long Term Goal 2: Pt will complete OOB transfers using 2WW/rollator Mod Ind.   Long Term Goal 3: Pt will ambulate 10 ft and 50 ft with turns over level surface using 2WW Mod Ind. Long Term Goal 4: Pt will ambulate >/=150 ft over level surface and 10 ft of uneven surface using rollator with SBA-Sup. Long Term Goal 5: Pt will ascend/descend curb step (2\"/4\" step height) and 4-5 steps using railings with Min A. Additional Goals?: Yes  Long term goal 6: Pt will complete object retrieval from the floor using 2WW/rollator and reacher Mod Ind.:        Plan of Care                                                                              Times per week: 5 days per week for a minimum of 60 minutes/day plus group as appropriate for 60 minutes. Treatment to include Current Treatment Recommendations: Strengthening, ROM, Balance training, Functional mobility training, Transfer training, Endurance training, Gait training, Stair training, Pain management, Home exercise program, Safety education & training, Patient/Caregiver education & training, Equipment evaluation, education, & procurement, Positioning, Therapeutic activities    Treatment and documentation provided by: Evonne Runner, SPTA  I have observed the SPTA's interventions provided. I have read and agree with the SPTA's documentation.    Electronically signed by   Jojo Mena, [id number]  1/30/2023, 11:05 AM

## 2023-01-30 NOTE — PROGRESS NOTES
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]       Date 2023       Day of ARU Week:  1   Time IN//1020   Individual Tx Minutes 60   Group Tx Minutes    Co-Treat Minutes    Concurrent Tx Minutes    TOTAL Tx Time Mins 60   Variance Time    Variance Time []   Refusal due to:     []   Medical hold/reason:    []   Illness   []   Off Unit for test/procedure  []   Extra time needed to complete task  []   Therapeutic need  []   Other (specify):   Restrictions Restrictions/Precautions: Fall Risk, General Precautions, Weight Bearing         Communication with other providers: [x]   OK to see per nursing:     []   Spoke with team member regarding:      Subjective observations and cognitive status: Patient sitting up in bed upon approach, pleasant and agreeable to therapy, patient requests a shower 2* to not washing up over the weekend      Pain level/location:    /10       Location:    Discharge recommendations  Anticipated discharge date:    Destination: []home alone   []home alone w assist prn   [x] home w/ family    [] Continuous supervision       []SNF    [] Assisted living     [] Other:   Continued therapy: [x]HHC OT  []OUTPATIENT  OT   [] No Further OT  Equipment needs: None        ADLs:    Eating: Eating  Assistance Needed: Independent  Comment: X  CARE Score: 6  Discharge Goal: Set-up or clean-up assistance       Oral Hygiene: Oral Hygiene  Assistance Needed: Independent  Comment: Ind in stance  CARE Score: 6  Discharge Goal: Independent    UB/LB Bathing: Shower/Bathe Self  Assistance Needed: Independent  Comment:  Mod I seated majority  CARE Score: 6  Discharge Goal: Independent    UB Dressing: Upper Body Dressing  Assistance Needed: Independent  Comment: X  CARE Score: 6  Discharge Goal: Independent         LB Dressing: Lower Body Dressing  Assistance Needed: Independent  Comment: X  CARE Score: 6  Discharge Goal: Independent    Donning and [place] Footwear: Putting On/Taking Off Footwear  Assistance Needed: Independent  Comment: X  CARE Score: 6  Discharge Goal: Independent      Toiletin Road needed: Independent  Comment: X  CARE Score: 6  Discharge Goal: Independent      Toilet Transfers: Toilet Transfer  Assistance needed: Independent  Comment: X  CARE Score: 6  Discharge Goal: Independent  Device Used:    []   Standard Toilet         [x]   Grab Bars           [x]  Bedside Commode  frame over standard toilet     []   Elevated Toilet          []   Other:        Bed Mobility:           []   Pt received out of bed   Rolling R/L:    Scooting:  Ind  Supine --> Sit:  Ind  Sit --> Supine:  Ind    Transfers:    Sit--> Stand: Mod I   Stand --> Sit:   Mod I   Stand-Pivot: Mod I   Other:    Assistive device required for transfer:   4WW      Functional Mobility:  throughout room/bathroom   Assistance:   Mod I   Device:   []   Rolling Walker     []   Standard Walker []   Wheelchair        []   [address]       [x]   4-Wheeled Sheeba Lester         []   Cardiac Sheeba Lester       []   Other:        Homemaking Tasks:   Patient retrieves clothing from closet and transports to bathroom at [place] level       Additional Therapeutic activities/exercises completed this date:     [x]   ADL Training   [x]   Balance/Postural training        Bed/Transfer Training   [][]   Endurance Training   []   Neuromuscular Re-ed   []   Nu-step:  Time:        Level:         #Steps:       []   Rebounder:    []  Seated     []  Standing        []   Supine Ther Ex (reps/sets):     []   Seated Ther Ex (reps/sets):     []   Standing Ther Ex (reps/sets):     []   Other:      Comments:      Patient/Caregiver Education and Training:   [x]   [employer] Equipment Use  [x]   Bed Mobility/Transfer Technique/Safety  [x]   Energy Conservation Tips  []   Family training  [x]   Postural Awareness  [x]   Safety During Functional Activities  []   Reinforced Patient's Precautions   []   Progress was updated and reviewed in Rehabtracker with patient and/or family this         date.     Treatment Plan for Next Session: POC to continue as tolerated         Treatment/Activity Tolerance:   [x] Tolerated treatment with no adverse effects    [] Patient limited by fatigue  [] Patient limited by pain   [] Patient limited by medical complications:    [] Adverse reaction to Tx:   [] Significant change in status    Safety:       []  bed alarm set    [x]  chair alarm set    []  Pt refused alarms                []  Telesitter activated      [x]  Gait belt used during tx session      []other:       Number of Minutes/Billable Intervention  Therapeutic Exercise    ADL Self-care 45   Neuro Re-Ed    Therapeutic Activity 15   Group    Other:    TOTAL 60       Social History  Social/Functional History  Lives With: Spouse Darylene Casey- in good health and can help PRN)  Type of Home: House  Home Layout: Multi-level (Tri-level 7 steps upstairs - however currently installing chair lift)  Home Access: Stairs to enter without rails  Entrance Stairs - Number of Steps: 2 ANNIE - Does have handle attached to the door of the home that she uses  Bathroom Shower/Tub: Tub/Shower unit  Bathroom Toilet: Standard  Bathroom Equipment: Shower chair, Grab bars in shower, 3-in-1 commode  Bathroom Accessibility: Walker accessible  Home Equipment: Cane, Rollator, Reacher, Walker, rolling  Has the patient had two or more falls in the past year or any fall with injury in the past year?: Yes (> 10 falls in the last year)  Receives Help From: Family  ADL Assistance: Independent  Homemaking Responsibilities: Yes  Meal Prep Responsibility: Secondary (Pt cooks 1-2 times a week.)  Laundry Responsibility: No ( completes.)  Cleaning Responsibility: No ( completes.)  Bill Paying/Finance Responsibility: No ( completes.)  Shopping Responsibility: Secondary (Pt states \"I go with him ~ 30% of the time\".  completes mostly.)  Dependent Care Responsibility: No  Health Care Management: Primary  Ambulation Assistance: Independent (Pt uses 2WW at night to go in and out of the bathroom, sometimes in the kitchen and states she uses the walker to help her get out of chairs. 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 Pt has cataracts. No pets at home but loves animals. Pt uses pill box for 1 pill at home. Spouse manages finances. Additional Comments: Pt sleeps in regular flat bed. Objective                                                                                    Goals:  (Update in navigator)  Short Term Goals  Time Frame for Short Term Goals: STGs=LTGs:  Long Term Goals  Time Frame for Long Term Goals : 10-12 days or until d/c. Long Term Goal 1: Pt will complete oral hygiene and grooming tasks with IND. Long Term Goal 2: Pt will complete total body bathing with AE PRN and Mod I.  Long Term Goal 3: Pt will complete UB dressing with IND. Long Term Goal 4: Pt will complete LB dressing with AE PRN and Mod I.  Long Term Goal 5: Pt will doff/don footwear with AE PRN and Mod I. Additional Goals?: Yes  Long Term Goal 6: Pt will complete toileting with IND.   Long Term Goal 7: Pt will complete functional transfers (bed, chair, toilet, shower) with DME PRN and Mod I.  Long Term Goal 8: Pt will perform therex/therax to facilitate an increase in strength/endurance with emphasis on dynamic standing balance/tolerance > 8 mins with Mod I.  Long Term Goal 9: Pt will perform meal prep tasks with Mod I.:        Plan of Care                                                                              Times per week: 5 days per week for a minimum of 60 minutes/day plus group as appropriate for 60 minutes.   Treatment to include Occupational Therapy Plan  Current Treatment Recommendations: Strengthening, ROM, Endurance training, Functional mobility training, Safety education & training, Patient/Caregiver education & training, Pain management, Equipment evaluation, education, & procurement, Positioning, Self-Care / ADL, Home management training, Balance training, Coordination training    Electronically signed by   ARNULFO Deleon,  1/30/2023, 10:09 AM

## 2023-01-31 PROCEDURE — 97530 THERAPEUTIC ACTIVITIES: CPT

## 2023-01-31 PROCEDURE — 94150 VITAL CAPACITY TEST: CPT

## 2023-01-31 PROCEDURE — 97116 GAIT TRAINING THERAPY: CPT

## 2023-01-31 PROCEDURE — 2700000000 HC OXYGEN THERAPY PER DAY

## 2023-01-31 PROCEDURE — 6370000000 HC RX 637 (ALT 250 FOR IP): Performed by: PHYSICAL MEDICINE & REHABILITATION

## 2023-01-31 PROCEDURE — 97535 SELF CARE MNGMENT TRAINING: CPT

## 2023-01-31 PROCEDURE — 1280000000 HC REHAB R&B

## 2023-01-31 PROCEDURE — 6370000000 HC RX 637 (ALT 250 FOR IP): Performed by: STUDENT IN AN ORGANIZED HEALTH CARE EDUCATION/TRAINING PROGRAM

## 2023-01-31 PROCEDURE — 99232 SBSQ HOSP IP/OBS MODERATE 35: CPT | Performed by: PHYSICAL MEDICINE & REHABILITATION

## 2023-01-31 PROCEDURE — 94761 N-INVAS EAR/PLS OXIMETRY MLT: CPT

## 2023-01-31 PROCEDURE — 94664 DEMO&/EVAL PT USE INHALER: CPT

## 2023-01-31 RX ORDER — SENNA PLUS 8.6 MG/1
1 TABLET ORAL NIGHTLY
Qty: 30 TABLET | Refills: 0 | COMMUNITY
Start: 2023-01-31 | End: 2023-03-02

## 2023-01-31 RX ORDER — PSEUDOEPHEDRINE HCL 30 MG
100 TABLET ORAL DAILY
COMMUNITY
Start: 2023-02-01

## 2023-01-31 RX ORDER — OXYCODONE HYDROCHLORIDE 5 MG/1
5 TABLET ORAL EVERY 6 HOURS PRN
Status: DISCONTINUED | OUTPATIENT
Start: 2023-01-31 | End: 2023-02-01 | Stop reason: HOSPADM

## 2023-01-31 RX ORDER — OXYCODONE HYDROCHLORIDE 5 MG/1
5 TABLET ORAL EVERY 6 HOURS PRN
Qty: 10 TABLET | Refills: 0 | Status: SHIPPED | OUTPATIENT
Start: 2023-01-31 | End: 2023-02-07

## 2023-01-31 RX ORDER — PANTOPRAZOLE SODIUM 40 MG/1
40 TABLET, DELAYED RELEASE ORAL
Qty: 30 TABLET | Refills: 0 | Status: SHIPPED | OUTPATIENT
Start: 2023-02-01

## 2023-01-31 RX ORDER — AMLODIPINE BESYLATE 5 MG/1
5 TABLET ORAL DAILY
Qty: 30 TABLET | Refills: 0 | Status: SHIPPED | OUTPATIENT
Start: 2023-02-01

## 2023-01-31 RX ORDER — TIZANIDINE 4 MG/1
4 TABLET ORAL EVERY 6 HOURS PRN
Qty: 20 TABLET | Refills: 0 | Status: SHIPPED | OUTPATIENT
Start: 2023-01-31

## 2023-01-31 RX ORDER — LIDOCAINE 4 G/G
1 PATCH TOPICAL DAILY
Qty: 10 PATCH | Refills: 0 | Status: SHIPPED | OUTPATIENT
Start: 2023-02-01

## 2023-01-31 RX ADMIN — SENNOSIDES 8.6 MG: 8.6 TABLET, COATED ORAL at 20:52

## 2023-01-31 RX ADMIN — PANTOPRAZOLE SODIUM 40 MG: 40 TABLET, DELAYED RELEASE ORAL at 05:35

## 2023-01-31 RX ADMIN — ACETAMINOPHEN 650 MG: 325 TABLET ORAL at 12:18

## 2023-01-31 RX ADMIN — OXYCODONE HYDROCHLORIDE 5 MG: 5 TABLET ORAL at 06:14

## 2023-01-31 RX ADMIN — AMLODIPINE BESYLATE 5 MG: 5 TABLET ORAL at 08:48

## 2023-01-31 RX ADMIN — DOCUSATE SODIUM 100 MG: 100 CAPSULE, LIQUID FILLED ORAL at 08:47

## 2023-01-31 RX ADMIN — OXYCODONE HYDROCHLORIDE 5 MG: 5 TABLET ORAL at 22:11

## 2023-01-31 RX ADMIN — ACETAMINOPHEN 650 MG: 325 TABLET ORAL at 17:00

## 2023-01-31 RX ADMIN — ACETAMINOPHEN 650 MG: 325 TABLET ORAL at 08:47

## 2023-01-31 RX ADMIN — ACETAMINOPHEN 650 MG: 325 TABLET ORAL at 20:52

## 2023-01-31 ASSESSMENT — PAIN DESCRIPTION - PAIN TYPE
TYPE: ACUTE PAIN
TYPE: ACUTE PAIN

## 2023-01-31 ASSESSMENT — PAIN - FUNCTIONAL ASSESSMENT
PAIN_FUNCTIONAL_ASSESSMENT: ACTIVITIES ARE NOT PREVENTED
PAIN_FUNCTIONAL_ASSESSMENT: PREVENTS OR INTERFERES SOME ACTIVE ACTIVITIES AND ADLS
PAIN_FUNCTIONAL_ASSESSMENT: ACTIVITIES ARE NOT PREVENTED

## 2023-01-31 ASSESSMENT — PAIN SCALES - GENERAL
PAINLEVEL_OUTOF10: 4
PAINLEVEL_OUTOF10: 2
PAINLEVEL_OUTOF10: 1
PAINLEVEL_OUTOF10: 0
PAINLEVEL_OUTOF10: 0
PAINLEVEL_OUTOF10: 7

## 2023-01-31 ASSESSMENT — PAIN DESCRIPTION - ORIENTATION
ORIENTATION: LEFT

## 2023-01-31 ASSESSMENT — PAIN DESCRIPTION - DESCRIPTORS
DESCRIPTORS: DISCOMFORT
DESCRIPTORS: SHARP
DESCRIPTORS: SHARP

## 2023-01-31 ASSESSMENT — PAIN DESCRIPTION - LOCATION
LOCATION: RIB CAGE

## 2023-01-31 ASSESSMENT — PAIN DESCRIPTION - FREQUENCY: FREQUENCY: INTERMITTENT

## 2023-01-31 NOTE — PROGRESS NOTES
John Minor    : 1950  Acct #: [de-identified]  MRN: [id number]              PM&R Progress Note      Admitting diagnosis: Inclusion body myositis ( [place] [address])     Comorbid diagnoses impacting rehabilitation: Generalized weakness, gait disturbance, dysphagia, acute kidney injury, hypokalemia, left 10th rib fracture, essential hypertension, IBS, fractured toes of both feet, pancytopenia    Chief complaint: She is feeling more comfortable off the oxygen. No new dizziness. Prior (baseline) level of function: Independent. Current level of function:         Current  IRF-SNOW and Goals:   Occupational Therapy:    Short Term Goals  Time Frame for Short Term Goals: STGs=LTGs :   Long Term Goals  Time Frame for Long Term Goals : 10-12 days or until d/c. Long Term Goal 1: Pt will complete oral hygiene and grooming tasks with IND. Long Term Goal 2: Pt will complete total body bathing with AE PRN and Mod I.  Long Term Goal 3: Pt will complete UB dressing with IND. Long Term Goal 4: Pt will complete LB dressing with AE PRN and Mod I.  Long Term Goal 5: Pt will doff/don footwear with AE PRN and Mod I. Additional Goals?: Yes  Long Term Goal 6: Pt will complete toileting with IND. Long Term Goal 7: Pt will complete functional transfers (bed, chair, toilet, shower) with DME PRN and Mod I.  Long Term Goal 8: Pt will perform therex/therax to facilitate an increase in strength/endurance with emphasis on dynamic standing balance/tolerance > 8 mins with Mod I.  Long Term Goal 9: Pt will perform meal prep tasks with Mod I. :                                       Eating: Eating  Assistance Needed: Independent  Comment: X  CARE Score: 6  Discharge Goal: Set-up or clean-up assistance       Oral Hygiene: Oral Hygiene  Assistance Needed: Independent  Comment: Ind in stance  CARE Score: 6  Discharge Goal: Independent    UB/LB Bathing: Shower/Bathe Self  Assistance Needed: Independent  Comment:  Mod I seated majority  CARE Score: 6  Discharge Goal: Independent    UB Dressing: Upper Body Dressing  Assistance Needed: Independent  Comment: X  CARE Score: 6  Discharge Goal: Independent         LB Dressing: Lower Body Dressing  Assistance Needed: Independent  Comment: X  CARE Score: 6  Discharge Goal: Independent    Donning and [place] Footwear: Putting On/Taking Off Footwear  Assistance Needed: Independent  Comment: X  CARE Score: 6  Discharge Goal: Independent      [address] needed: Independent  Comment: X  CARE Score: 6  Discharge Goal: Independent      Toilet Transfers: Toilet Transfer  Assistance needed: Independent  Comment: X  CARE Score: 6  Discharge Goal: Independent    Physical Therapy:         Long Term Goals  Time Frame for Long Term Goals : 10 tx days:  Long Term Goal 1: Pt will complete bed mobility (scooting, rolling R/L, and sup<->sit) Ind.  Long Term Goal 2: Pt will complete OOB transfers using 2WW/rollator Mod Ind. Long Term Goal 3: Pt will ambulate 10 ft and 50 ft with turns over level  surface using 2WW Mod Ind. Long Term Goal 4: Pt will ambulate >/=150 ft over level surface and 10 ft of uneven surface using rollator with SBA-Sup. Long Term Goal 5: Pt will ascend/descend curb step (2\"/4\" step height) and 4-5 steps using railings with Min A. Additional Goals?: Yes  Long term goal 6: Pt will complete object retrieval from the floor using 2WW/rollator and reacher Mod Ind.       Bed Mobility:   Sit to Lying  Assistance Needed: Supervision or touching assistance  Comment: SBA without use of bed features  CARE Score: 4  Discharge Goal: Independent  Roll Left and Right  Assistance Needed: Supervision or touching assistance  Comment: pt was able to tolerate rolling to R/L today without use of bed features with SBA  Reason if not Attempted: Not attempted due to medical condition or safety concerns  CARE Score: 4  Discharge Goal: Independent  Lying to Sitting on Side of Bed  Assistance Needed: Independent  Comment: Ind without use of bed features  CARE Score: 6  Discharge Goal: Independent    Transfers:    Sit to Stand  Assistance Needed: Independent  Comment: Mod Ind with rollator; pt was more attentive and consistent with management of hand brakes prior to standing; immediate standing balance was consistently steady  CARE Score: 6  Discharge Goal: Independent  Chair/Bed-to-Chair Transfer  Assistance Needed: Supervision or touching assistance  Comment: SBA-Sup with rollator  CARE Score: 4  Discharge Goal: Independent  Toilet Transfer  Assistance needed: Independent  Comment: Mod Ind with rollator and BSC frame over toilet  CARE Score: 6  Car Transfer  Assistance Needed: Supervision or touching assistance  Comment: SBA-Sup with rollator  CARE Score: 4  Discharge Goal: Independent    Ambulation:    Walking Ability  Does the Patient Walk?: Yes     Walk 10 Feet  Assistance Needed: Independent  Comment: Mod Ind using rollator and regular shoes (gait quality consistently steady)  CARE Score: 6  Discharge Goal: Independent     Walk 50 Feet with Two Turns  Assistance Needed: Independent  Comment: Mod Ind using rollator and regular shoes (gait quality consistently steady)  CARE Score: 6  Discharge Goal: Independent     Walk 150 Feet  Assistance Needed: Independent  Comment:  Mod Ind using rollator and regular shoes (gait quality consistently steady); pt was able to ambulate up to 364 ft consecutively  CARE Score: 6  Discharge Goal: Supervision or touching assistance     Walking 10 Feet on Uneven Surfaces  Assistance Needed: Supervision or touching assistance  Comment: CGA using rollator (pt expressed fear of falling that she immediately requested for PT to steady her)  CARE Score: 4  Discharge Goal: Supervision or touching assistance     1 Step (Curb)  Assistance Needed: Partial/moderate assistance  Comment: pt was able to tolerate  2\" curb today using rollator with Min A on ascent (required steadying assist and some assistance to fully lift AD due to impaired strength and balance) and CGA on descent; required sequential cues on stepping pattern due to RLE weakness and increased R foot pain; pt initiated management of hand brakes prior to ascent/descent  Reason if not Attempted: Patient refused  CARE Score: 3  Discharge Goal: Partial/moderate assistance     4 Steps  Assistance Needed: Supervision or touching assistance  Comment: pt progressed to being able to ascend/descend 5 steps (4\"/6\" step heights) with CGA with both hands holding L railing on ascent and railings on descent with step-to pattern leading with LLE on ascent and RLE on descent; pt had to slightly position at an angle on descent due to her impaired strength and balance  Reason if not Attempted: Not attempted due to medical condition or safety concerns  CARE Score: 4  Discharge Goal: Partial/moderate assistance     12 Steps  Comment: pt does not demonstrate potential to progressing to this mobility task due to progressive weakness from underlying IBM  Reason if not Attempted: Not attempted due to medical condition or safety concerns  CARE Score: 88  Discharge Goal: Not Attempted       Wheelchair:  w/c Ability: Wheelchair Ability  Uses a Wheelchair and/or Scooter?: No                Balance:        Object: Picking Up Object  Assistance Needed: Partial/moderate assistance  Comment: Min A using rollator and reacher; required stabilization of AD as pt performed required task as pt did not manage hand brakes; pt used R hand to manipulate reacher due to chronic L hand limitation  CARE Score: 3  Discharge Goal: Independent    I      Exam:    Blood pressure (!) 155/80, pulse 80, temperature 97.4 °F (36.3 °C), temperature source Oral, resp. rate 18, height 5' 4\" (1.626 m), weight 126 lb 5.2 oz (57.3 kg), SpO2 91 %. General: Up in a wheelchair. Maintaining good core control. No oxygen supplementation today. HEENT: Gazing right and left. Clear speech. MMM.  No JVD. Pulmonary: Shallow respirations without wheezing or coughing. Cardiac: Regular rate and rhythm. Abdomen: Patient's abdomen is soft and nondistended. Bowel sounds were present throughout. There was no rebound, guarding or masses noted. Upper extremities: Unable to manipulate the wheelchair with her hands. Weak  persist.  No new swelling or bruising. Lower extremities: Attempting to return the wheelchair with her legs. Heels clear. No signs of DVT. Sitting balance was good. Standing balance was fair-.    Lab Results   Component Value Date    WBC 3.6 (L) 01/26/2023    HGB 11.0 (L) 01/26/2023    HCT 33.1 (L) 01/26/2023    MCV 94.3 01/26/2023     01/26/2023     No results found for: INR, PROTIME  Lab Results   Component Value Date    CREATININE 0.7 01/26/2023    BUN 18 01/26/2023     (L) 01/26/2023    K 3.9 01/26/2023    CL 99 01/26/2023    CO2 29 01/26/2023     Lab Results   Component Value Date    ALT 20 01/21/2023    AST 23 01/21/2023    ALKPHOS 67 01/21/2023    BILITOT 0.7 01/21/2023       Expected length of stay  prior to a supervised level of function for discharge home with a walker and [facility] OT/PT is 2/3/2023. 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 She is gaining some physical strength through the repetitive tasks of her daily occupational and physical therapy with speech-language pathology. Some bad habits and impulsivity continue to impact her weightbearing safety. No GI side effects with her current medicines. Continuing with aggressive pulmonary hygiene measures and nutritional support. Continuing with adaptive equipment training, caregiver education and bowel oral medications. Generally continent of bladder. Outpatient follow-up with her neurologist and PCP following rehab. Verbal cues and CGA for transfers.   DVT prophylaxis: Lovenox 40 mg subcu daily is being used cautiously concerning her pancytopenia. Serial blood counts and monitoring closely for signs of bleeding complications. Weightbearing activities have been somewhat limited but still tried daily. GI prophylaxis. No clinical signs of blood loss. Uncontrolled pain: She is tolerating the acetaminophen and oxycodone for her pain related to her rib fractures, toe fractures and myositis. Tolerating her scheduled acetaminophen. Diathermy and frequent repositioning are available. Bowel intervention while on the analgesics has been productive. Dysphagia: A soft and bite-size diet is required at this point. Speech-language pathology is working on swallowing technique recovery. Upright for meds and meals. Thin liquids have been tolerated reasonably well. Acute kidney injury: Minimizing fluctuations of blood pressure and the use of nephrotoxic medications. Serial chemistries to monitor renal function and the status of her hypokalemia. Supplementing her potassium as needed. Encouraging consistent oral hydration. Hemoglobin 11.0. Low normal white count. .  Rib and toe fractures: Weightbearing as tolerated. Pain management. Incentive spirometry. Nocturnal oxygen desaturations noted. Will increase ventilation with nebulizers and encourage spirometry use. Hypertension: Norvasc is required to control her systolic blood pressure. Target systolic blood pressure ranges 120-140. Vital signs are checked at rest and with activity. Her blood pressure is just above the target range again today. Monitoring closely. IBS: Regular bowel intervention with Colace and Senokot. [place] food choices encouraged. Zofran as needed.

## 2023-01-31 NOTE — PROGRESS NOTES
Comprehensive Nutrition Assessment    Type and Reason for Visit:  Reassess    Nutrition Recommendations/Plan:   Continue regular diet   Encourage consistent meal intake  Will continue to follow up during stay      Malnutrition Assessment:  Malnutrition Status:  No malnutrition (01/24/23 1509)    Context:  Chronic Illness       Nutrition Assessment:    Remains on regular diet with extra gravy or sauce with meals. Meal intake % at most meals, some meal orders smaller. Eating 3 meals each day during rehab stay. Will continue to follow at low nutrition risk at this time. Nutrition Related Findings:    plan for discharge tomorrow Wound Type: None       Current Nutrition Intake & Therapies:    Average Meal Intake: %, 51-75%  Average Supplements Intake: None Ordered  ADULT DIET; Regular    Anthropometric Measures:  Height: 5' 4\" (162.6 cm)  Ideal Body Weight (IBW): 120 lbs (55 kg)    Admission Body Weight: 134 lb 0.6 oz (60.8 kg)  Current Body Weight: 135 lb 9.3 oz (61.5 kg), 111.7 % IBW.  Weight Source: Bed Scale  Current BMI (kg/m2): 23.3  Usual Body Weight: 136 lb (61.7 kg) (last February 2022)  % Weight Change (Calculated): -1.4  Weight Adjustment For: No Adjustment                 BMI Categories: Normal Weight (BMI 22.0 to 24.9) age over 72    Estimated Daily Nutrient Needs:  Energy Requirements Based On: Kcal/kg  Weight Used for Energy Requirements: Current  Energy (kcal/day): 0644-2265 (25-30 aliza/kg)  Weight Used for Protein Requirements: Current  Protein (g/day): 61-73 (1-1.2 g/kg)  Method Used for Fluid Requirements: 1 ml/kcal  Fluid (ml/day): 1800    Nutrition Diagnosis:   No nutrition diagnosis at this time related to   as evidenced by      Nutrition Interventions:   Food and/or Nutrient Delivery: Continue Current Diet  Nutrition Education/Counseling: Education initiated  Coordination of Nutrition Care: Continue to monitor while inpatient       Goals:  Previous Goal Met: Progressing toward Goal(s)  Goals: PO intake 75% or greater, by next RD assessment       Nutrition Monitoring and Evaluation:   Behavioral-Environmental Outcomes: None Identified  Food/Nutrient Intake Outcomes: Food and Nutrient Intake  Physical Signs/Symptoms Outcomes: Biochemical Data, Meal Time Behavior, Skin, Weight    Discharge Planning:    Continue current diet     Preston Wooten RD, LD  Contact: [phone]

## 2023-01-31 NOTE — PLAN OF CARE
Problem: Pain  Goal: Verbalizes/displays adequate comfort level or baseline comfort level  1/31/2023 1101 by Avila Hartley RN  Outcome: Progressing  1/30/2023 2149 by Meera Castillo  Outcome: Progressing     Problem: Safety - Adult  Goal: Free from fall injury  1/31/2023 1101 by Avila Hartley RN  Outcome: Progressing  1/30/2023 2149 by Meera Castillo  Outcome: Progressing

## 2023-01-31 NOTE — PROGRESS NOTES
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]       Date 2023       Day of ARU Week:  2   Time IN/OUT 1045/1210  1500/1535   Individual Tx Minutes 85+35   Group Tx Minutes    Co-Treat Minutes    Concurrent Tx Minutes    TOTAL Tx Time Mins 120   Variance Time +15   Variance Time []   Refusal due to:     []   Medical hold/reason:    []   Illness   []   Off Unit for test/procedure  [x]   Extra time needed to complete tasks  []   Therapeutic need  []   Other (specify):   Restrictions Restrictions/Precautions  Restrictions/Precautions: Fall Risk, General Precautions, Weight Bearing  Position Activity Restriction  Other position/activity restrictions: Per Dr. Ruba Conti, Pt can either wear postop shoe to bilateral feet or regular shoe as tolerated. Pt has 2 L O2 via NC. Interdisciplinary communication [x]   Cleared for therapy per nursing     []   RN notified about issues during session  []   RN updated on pt performance  []   Spoke with   []   Spoke with OT  []   Spoke with MD  []   Other:    Subjective observations and cognitive status: Pt resting in recliner with BLE elevated and regular shoes on, states not sleeping well last night due to increased pain, states stair lift is being installed in her house today. Pt became tearful at some point when she talked about her disease and how it affected the quality of her life.      Pain level/location:    3/10       Location: posterior side of L ribcage    Discharge recommendations  Anticipated discharge date:  2023  Destination: []home alone   []home alone with assist PRN     [x] home w/ family      [] Continuous supervision  []SNF    [] Assisted living     [] Other:  Continued therapy: [x]C PT  []OUTPATIENT PT [] No Further PT  []SNF PT  Caregiver training recommended: []Yes  [] No   Equipment needs: has 2WW and rollator      PT IRF-SNOW scores and goals for discharge assessment:   Roll Left and Right  Assistance Needed: Independent  Comment: Ind in regular bed  Reason if not Attempted: Not attempted due to medical condition or safety concerns  CARE Score: 6  Discharge Goal: Independent    Sit to Lying  Assistance Needed: Independent  Comment: Ind in regular bed  CARE Score: 6  Discharge Goal: Independent    Lying to Sitting on Side of Bed  Assistance Needed: Independent  Comment: Ind in regular bed  CARE Score: 6  Discharge Goal: Independent    Sit to Stand  Assistance Needed: Independent  Comment: Mod Ind with rollator  CARE Score: 6  Discharge Goal: Independent    Chair/Bed-to-Chair Transfer  Assistance Needed: Independent  Comment: Mod Ind with rollator  CARE Score: 6  Discharge Goal: Independent    Toilet Transfer  Assistance needed: Independent  Comment: Mod Ind with rollator and BSC frame over toilet  CARE Score: 6  Discharge Goal: Independent    Car Transfer  Assistance Needed: Independent  Comment: Mod Ind with rollator  CARE Score: 6  Discharge Goal: Independent    Walk 10 Feet? Walk 10 Feet?: Yes    1 Step  1 Step?: Yes    Picking Up Object  Assistance Needed: Independent  Comment: Mod Ind with rollator and reacher  CARE Score: 6  Discharge Goal: Independent    Wheelchair Ability  Uses a Wheelchair and/or Scooter?: No       Walking Ability  Does the Patient Walk?: Yes    Walk 10 Feet  Assistance Needed: Independent  Comment: Mod Ind using rollator and regular shoes (gait quality consistently steady)  CARE Score: 6  Discharge Goal: Independent    Walk 50 Feet with Two Turns  Assistance Needed: Independent  Comment: Mod Ind using rollator and regular shoes (gait quality consistently steady)  CARE Score: 6  Discharge Goal: Independent    Walk 150 Feet  Assistance Needed: Independent  Comment:  Mod Ind using rollator and regular shoes (gait quality consistently steady); pt was able to ambulate up to 364 ft consecutively  CARE Score: 6  Discharge Goal: Supervision or touching assistance    Walking 10 Feet on Uneven Surfaces  Assistance Needed: Supervision or touching assistance  Comment: SBA using rollator  CARE Score: 4  Discharge Goal: Supervision or touching assistance    1 Step (Curb)  Assistance Needed: Supervision or touching assistance  Comment: CGA using rollator; pt progressed to being able to ascend/descend 4\" curb step today  CARE Score: 4  Discharge Goal: Partial/moderate assistance    4 Steps  Assistance Needed: Supervision or touching assistance  Comment: CGA with both hands holding L railing on ascent and railings on descent with step-to pattern leading with LLE on ascent and RLE on descent following pt's established routine; pt had to slightly position at an angle on descent due to her impaired strength and balance  CARE Score: 4  Discharge Goal: Partial/moderate assistance    12 Steps  Assistance Needed: Supervision or touching assistance  Comment: pt progressed to being able to complete this mobility task for 2 consecutive days; CGA with both hands holding L railing on ascent and railings on descent with step-to pattern leading with LLE on ascent and RLE on descent following pt's established routine; pt had to slightly position at an angle on descent due to her impaired strength and balance  CARE Score: 4  Discharge Goal: Not Attempted      Additional Therapeutic activities/exercises completed this date:     []   Nu-step:  Time:        Level:         #Steps:       []   Rebounder:    []  Seated     []  Standing        [x]   Balance training:   DUAL TASK TRAINING to promote recovery of automaticity with basic tasks  TYPE OF TASK SPECIFIC ACTIVITY PATIENT RESPONSE   Motor Level surface ambulation using rollator Consistent steady gait quality >150 ft; able to continue ambulating when responses were incorrect until able to provide correct response    Cognitive  Simple math problems  90% accuracy of cognitive responses     Level surface ambulation using rollator >150 ft with variable change in gait speed upon command with SBA          []   Postural training    []   Supine ther ex (reps/sets):     []   Seated ther ex (reps/sets):     []   Standing ther ex (reps/sets):     [x]   Other: Toileting activity completed Mod Ind     []   Other:    Comments:      Patient/Caregiver Education and Training:   []   Role of PT  []   Education about Dx  []   Use of call light for assist   []   HEP provided and explained   [x]   Treatment plan reviewed  []   Home safety  []   Wheelchair mobility/management   []   Body mechanics  []   Bed Mobility/Transfer technique  []   Gait technique/sequencing  []   Proper use of assistive device/adaptive equipment  [x]   Stair training/Advanced mobility safety and technique  []   Reinforced patient's precautions/mobility while maintaining precautions  []   Postural awareness  []   Family/caregiver training  [x]   Progress was updated and reviewed with patient  this date. [x]   Other: Pt education about benefits of continued walking regimen at home to prevent deconditioning and as a vital component of continued mobility     Treatment Plan for Next Session: discharge to home     Assessment: This pt demonstrated progress in all aspects of her functional mobility and even surpassed some of the established PT goals as her pain was more controlled, reduced anxiety, and consistent motivation to participate.      Treatment/Activity Tolerance:   [x] Tolerated treatment with no adverse effects    [] Patient limited by fatigue  [] Patient limited by pain   [] Patient limited by medical complications:    [] Adverse reaction to Tx:   [] Significant change in status    Safety:       []  bed alarm set    [x]  chair alarm set    []  Pt refused alarms                []  Telesitter activated      [x]  Gait belt used during tx session      []other:       Number of Minutes/Billable Intervention  Gait Training 60   Therapeutic Exercise    Neuro Re-Ed    Therapeutic Activity 60   Wheelchair Propulsion    Group    Other:    TOTAL 120     Social History  Social/Functional History  Lives With: Spouse Lauren Jones- in good health and can help PRN)  Type of Home: House  Home Layout: Multi-level (Tri-level 7 steps upstairs - however currently installing chair lift)  Home Access: Stairs to enter without rails  Entrance Stairs - Number of Steps: 2 ANNIE - Does have handle attached to the door of the home that she uses  Bathroom Shower/Tub: Tub/Shower unit  Bathroom Toilet: Standard  Bathroom Equipment: Shower chair, Grab bars in shower, 3-in-1 commode  Bathroom Accessibility: Walker accessible  Home Equipment: Cane, Rollator, Reacher, Walker, rolling  Has the patient had two or more falls in the past year or any fall with injury in the past year?: Yes (> 10 falls in the last year)  Receives Help From: Family  ADL Assistance: Independent  Homemaking Responsibilities: Yes  Meal Prep Responsibility: Secondary (Pt cooks 1-2 times a week.)  Laundry Responsibility: No ( completes.)  Cleaning Responsibility: No ( completes.)  Bill Paying/Finance Responsibility: No ( completes.)  Shopping Responsibility: Secondary (Pt states \"I go with him ~ 30% of the time\".  completes mostly.)  Dependent Care Responsibility: No  Health Care Management: Primary  Ambulation Assistance: Independent (Pt uses 2WW at night to go in and out of the bathroom, sometimes in the kitchen and states she uses the walker to help her get out of chairs.  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 Pt has cataracts. No pets at home but loves animals. Pt uses pill box for 1 pill at home. Spouse manages finances. Additional Comments: Pt sleeps in regular flat bed. Objective                                                                                    Goals:  (Update in navigator)   : Long Term Goals  Time Frame for Long Term Goals : 10 tx days:  Long Term Goal 1: Pt will complete bed mobility (scooting, rolling R/L, and sup<->sit) Ind.  Long Term Goal 2: Pt will complete OOB transfers using 2WW/rollator Mod Ind. Long Term Goal 3: Pt will ambulate 10 ft and 50 ft with turns over level  surface using 2WW Mod Ind. Long Term Goal 4: Pt will ambulate >/=150 ft over level surface and 10 ft of uneven surface using rollator with SBA-Sup. Long Term Goal 5: Pt will ascend/descend curb step (2\"/4\" step height) and 4-5 steps using railings with Min A. Additional Goals?: Yes  Long term goal 6: Pt will complete object retrieval from the floor using 2WW/rollator and reacher Mod Ind.:        Plan of Care                                                                              Times per week: 5 days per week for a minimum of 60 minutes/day plus group as appropriate for 60 minutes.   Treatment to include Current Treatment Recommendations: Strengthening, ROM, Balance training, Functional mobility training, Transfer training, Endurance training, Gait training, Stair training, Pain management, Home exercise program, Safety education & training, Patient/Caregiver education & training, Equipment evaluation, education, & procurement, Positioning, Therapeutic activities    Electronically signed by   Surinder Mckeon PT  1/31/2023, 3:38 PM

## 2023-01-31 NOTE — PROGRESS NOTES
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]       Date 2023       Day of ARU Week:  2   Time IN//800   Individual Tx Minutes 75   Group Tx Minutes    Co-Treat Minutes    Concurrent Tx Minutes    TOTAL Tx Time Mins 75   Variance Time    Variance Time []   Refusal due to:     []   Medical hold/reason:    []   Illness   []   Off Unit for test/procedure  []   Extra time needed to complete task  []   Therapeutic need  []   Other (specify):   Restrictions Restrictions/Precautions: Fall Risk, General Precautions, Weight Bearing         Communication with other providers: [x]   OK to see per nursing:     []   Spoke with team member regarding:      Subjective observations and cognitive status: Patient side lying upon approach, pleasant however states her ribs were hurting previous night and didn't sleep much, patient took pain medications approx an hour prior to therapy, requests shower this morning 2* to dc following day       Pain level/location:   3 /10       Location: ribs   Discharge recommendations  Anticipated discharge date:    Destination: []home alone   []home alone w assist prn   [x] home w/ family    [] Continuous supervision       []SNF    [] Assisted living     [] Other:   Continued therapy: [x]HHC OT  []OUTPATIENT  OT   [] No Further OT  Equipment needs: None        ADLs:    Eating: Eating  Assistance Needed: Independent  Comment: X  CARE Score: 6  Discharge Goal: Set-up or clean-up assistance       Oral Hygiene: Oral Hygiene  Assistance Needed: Independent  Comment: X  CARE Score: 6  Discharge Goal: Independent    UB/LB Bathing: Shower/Bathe Self  Assistance Needed: Independent  Comment: X  CARE Score: 6  Discharge Goal: Independent    UB Dressing: Upper Body Dressing  Assistance Needed: Independent  Comment: X  CARE Score: 6  Discharge Goal: Independent         LB Dressing: Lower Body Dressing  Assistance Needed: Independent  Comment: X  CARE Score: 6  Discharge Goal: Independent    Donning and [place] Footwear: Putting On/Taking Off Footwear  Assistance Needed: Independent  Comment: X  CARE Score: 6  Discharge Goal: Independent      Toiletin [address] needed: Independent  Comment: X  CARE Score: 6  Discharge Goal: Independent      Toilet Transfers: Toilet Transfer  Assistance needed: Independent  Comment: X  CARE Score: 6  Discharge Goal: Independent  Device Used:    []   Standard Toilet         [x]   Grab Bars           [x]  Bedside Commode frame over standard toilet        []   Elevated Toilet          []   Other:        Bed Mobility:           []   Pt received out of bed   Rolling R/L:    Scooting:  Ind  Supine --> Sit:  Ind  Sit --> Supine:  Ind    Transfers:    Sit--> Stand: Mod I   Stand --> Sit:   Mod I   Stand-Pivot: Mod I   Other:    Assistive device required for transfer:   4WW      Functional Mobility:  throughout room/bathroom   Assistance:   Mod I   Device:   []   Rolling Walker     []   Standard Walker []   Wheelchair        []   [facility]       [x]   4-Wheeled Tr Torres         []   Cardiac Tr Torres       []   Other:        Homemaking Tasks:   Patient retrieves clothing from closet and transports to bathroom at [place] level       Additional Therapeutic activities/exercises completed this date:     [x]   ADL Training   [x]   Balance/Postural training     [x]   Bed/Transfer Training   []   Endurance Training   []   Neuromuscular Re-ed   []   Nu-step:  Time:        Level:         #Steps:       []   Rebounder:    []  Seated     []  Standing        []   Supine Ther Ex (reps/sets):     []   Seated Ther Ex (reps/sets):     []   Standing Ther Ex (reps/sets):     []   Other:      Comments: Patient/Caregiver Education and Training:   [x]   Adaptive Equipment Use  [x]   Bed Mobility/Transfer Technique/Safety  [x]   Energy Conservation Tips  []   Family training  [x]   Postural Awareness  [x]   Safety During Functional Activities  []   Reinforced Patient's Precautions   []   Progress was updated and reviewed in Rehabtracker with patient and/or family this         date.     Treatment Plan for Next Session: Patient to [place] home following day having met all her goals       Treatment/Activity Tolerance:   [x] Tolerated treatment with no adverse effects    [] Patient limited by fatigue  [] Patient limited by pain   [] Patient limited by medical complications:    [] Adverse reaction to Tx:   [] Significant change in status    Safety:       [x]  bed alarm set    []  chair alarm set    []  Pt refused alarms                []  Telesitter activated      [x]  Gait belt used during tx session      []other:       Number of Minutes/Billable Intervention  Therapeutic Exercise    ADL Self-care 60   Neuro Re-Ed    Therapeutic Activity 15   Group    Other:    TOTAL 75       Social History  Social/Functional History  Lives With: Spouse Margot Saldana- in good health and can help PRN)  Type of Home: House  Home Layout: Multi-level (Tri-level 7 steps upstairs - however currently installing chair lift)  Home Access: Stairs to enter without rails  Entrance Stairs - Number of Steps: 2 ANNIE - Does have handle attached to the door of the home that she uses  Bathroom Shower/Tub: Tub/Shower unit  Bathroom Toilet: Standard  Bathroom Equipment: Shower chair, Grab bars in shower, 3-in-1 commode  Bathroom Accessibility: Walker accessible  Home Equipment: [address] [address], Reacher, Walker, rolling  Has the patient had two or more falls in the past year or any fall with injury in the past year?: Yes (> 10 falls in the last year)  Receives Help From: Family  ADL Assistance: Independent  Homemaking Responsibilities: Yes  Meal Prep Responsibility: Secondary (Pt cooks 1-2 times a week.)  Laundry Responsibility: No ( completes.)  Cleaning Responsibility: No ( completes.)  Bill Paying/Finance Responsibility: No ( completes.)  Shopping Responsibility: Secondary (Pt states \"I go with him ~ 30% of the time\".  completes mostly.)  Dependent Care Responsibility: No  Health Care Management: Primary  Ambulation Assistance: Independent (Pt uses 2WW at night to go in and out of the bathroom, sometimes in the kitchen and states she uses the walker to help her get out of chairs. 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 Pt has cataracts. No pets at home but loves animals. Pt uses pill box for 1 pill at home. Spouse manages finances. Additional Comments: Pt sleeps in regular flat bed. Objective                                                                                    Goals:  (Update in navigator)  Short Term Goals  Time Frame for Short Term Goals: STGs=LTGs:  Long Term Goals  Time Frame for Long Term Goals : 10-12 days or until d/c. Long Term Goal 1: Pt will complete oral hygiene and grooming tasks with IND. Long Term Goal 2: Pt will complete total body bathing with AE PRN and Mod I.  Long Term Goal 3: Pt will complete UB dressing with IND. Long Term Goal 4: Pt will complete LB dressing with AE PRN and Mod I.  Long Term Goal 5: Pt will doff/don footwear with AE PRN and Mod I. Additional Goals?: Yes  Long Term Goal 6: Pt will complete toileting with IND.   Long Term Goal 7: Pt will complete functional transfers (bed, chair, toilet, shower) with DME PRN and Mod I.  Long Term Goal 8: Pt will perform therex/therax to facilitate an increase in strength/endurance with emphasis on dynamic standing balance/tolerance > 8 mins with Mod I.  Long Term Goal 9: Pt will perform meal prep tasks with Mod I.:        Plan of Care                                                                              Times per week: 5 days per week for a minimum of 60 minutes/day plus group as appropriate for 60 minutes.   Treatment to include Occupational Therapy Plan  Current Treatment Recommendations: Strengthening, ROM, Endurance training, Functional mobility training, Safety education & training, Patient/Caregiver education & training, Pain management, Equipment evaluation, education, & procurement, Positioning, Self-Care / ADL, Home management training, Balance training, Coordination training    Electronically signed by   Cathaleen Severin, OTA,  1/31/2023, 7:48 AM

## 2023-01-31 NOTE — CARE COORDINATION
Left  for PCP requesting follow up visit. Case mgt met with patient in room. Her stair lift is being installed today. She's looking forward to dc home tomorrow with [facility]. Her spouse will provide dc transport. Patient's first choice HHC isn't open. [address] [place] [address] Rd second choice. Referral faxed to [facility].

## 2023-01-31 NOTE — PROGRESS NOTES
01/31/23 1235   Encounter Summary   Encounter Overview/Reason  Spiritual/Emotional Needs   Service Provided For: Patient   Referral/Consult From: Volunteer   Support System Spouse   Last Encounter  01/31/23  (Volunteer visit by Simone; documented by Chaplain Sid)   Complexity of Encounter Low   Begin Time 1000   End Time  1005   Total Time Calculated 5 min   Encounter    Type Follow up   Spiritual/Emotional needs   Type Spiritual Support   Rituals, Rites and Sacraments   Type Bahai Communion   Assessment/Intervention/Outcome   Assessment Calm   Intervention Active listening   Outcome Encouraged   Plan and Referrals   Plan/Referrals Continue to visit, (comment)

## 2023-02-01 VITALS
RESPIRATION RATE: 18 BRPM | OXYGEN SATURATION: 98 % | SYSTOLIC BLOOD PRESSURE: 136 MMHG | HEART RATE: 88 BPM | DIASTOLIC BLOOD PRESSURE: 83 MMHG | BODY MASS INDEX: 23.22 KG/M2 | WEIGHT: 136.02 LBS | TEMPERATURE: 97.5 F | HEIGHT: 64 IN

## 2023-02-01 PROCEDURE — 94150 VITAL CAPACITY TEST: CPT

## 2023-02-01 PROCEDURE — 94375 RESPIRATORY FLOW VOLUME LOOP: CPT

## 2023-02-01 PROCEDURE — 6370000000 HC RX 637 (ALT 250 FOR IP): Performed by: PHYSICAL MEDICINE & REHABILITATION

## 2023-02-01 PROCEDURE — 94761 N-INVAS EAR/PLS OXIMETRY MLT: CPT

## 2023-02-01 PROCEDURE — 6370000000 HC RX 637 (ALT 250 FOR IP): Performed by: STUDENT IN AN ORGANIZED HEALTH CARE EDUCATION/TRAINING PROGRAM

## 2023-02-01 RX ADMIN — ACETAMINOPHEN 650 MG: 325 TABLET ORAL at 09:00

## 2023-02-01 RX ADMIN — DOCUSATE SODIUM 100 MG: 100 CAPSULE, LIQUID FILLED ORAL at 09:00

## 2023-02-01 RX ADMIN — AMLODIPINE BESYLATE 5 MG: 5 TABLET ORAL at 09:00

## 2023-02-01 NOTE — PROGRESS NOTES
[facility] Discharge Assessment    Transportation  \"In the past 6-12 months, has lack of transportation kept you from medical appointments, meetings, work, or from getting things needed for daily living? \"Check all that apply:  [] A.  Yes, it has kept me from medical appointments or from getting my medications  [] B.  Yes, it has kept me from non-medical meetings, appointments, work, or from getting things that I need  [x] C.  No  [] X. Patient unable to respond    Provision of Current Reconciled Medication List to Subsequent Provider at Discharge     [x] No, current reconciled medication list not provided to the subsequent provider.  [] Yes, current reconciled medication list provided to the subsequent provider. YES if D/C to Acute hospital, SNF, home with home health  (**We MUST Select route of transmission below**)      [] Via Electronic Health Record   [] [employer]  [] Verbal (e.g. in person, telephone, video conferencing)  [] Paper-based (e.g. fax, copies, printouts)   [] Other Methods (e.g. texting, email, CDs)    Provision of Current Reconciled Medication List to Patient at Discharge  [] No, current reconciled medication list not provided to the patient, family and/or caregiver. NO If D/C to Acute hospital, SNF, home with home health   [x] Yes, current reconciled medication list provided to the patient, family and/or caregiver. YES if D/C home with Outpatient or no services   (**WE MUST Select route of transmission below**)     [] Via Electronic Health Record (e.g., electronic access to patient portal)   [] [employer]  [] Verbal (e.g. in person, telephone, video conferencing)  [] Paper-based (e.g. fax, copies, printouts)   [] Other Methods (e.g. texting, email, CDs)    Health Literacy  \"How often do you need to have someone help you when you read instructions, pamphlets, or other written material from your doctor or pharmacy? \"  [x]  0. Never  []  1. Rarely  []  2. Sometimes  []  3. Often  []  4. Always  []  8. Patient unable to respond    Signs and Symptoms of Delirium  A. Acute Onset Mental Status Change - Is there evidence of an acute change in mental status from the patient's baseline? [x] 0. No  [] 1. Yes    B. Inattention - Did the patient have difficulty focusing attention, for example being easily distractible or having difficulty keeping track of what was being said? [x]  0. Behavior not present  []  1. Behavior continuously present, does not fluctuate  []  2. Behavior present, fluctuates (comes and goes, changes in severity)    C. Disorganized thinking - Was the patient's thinking disorganized or incoherent (rambling or irrelevant conversation, unclear or illogical flow of ideas, or unpredictable switching from subject to subject)? [x]  0. Behavior not present  []  1. Behavior continuously present, does not fluctuate  []  2. Behavior present, fluctuates (comes and goes, changes in severity)    D. Altered level of consciousness - Did the patient have altered level of consciousness as indicated by any of the following criteria? Vigilant - startled easily to any sound or touch  Lethargic - repeatedly dozed off while being asked questions, but responded to voice or touch  Stuporous - very difficulty to arouse and keep aroused for the interview  Comatose - could not be aroused  [x]  0. Behavior not present  []  1. Behavior continuously present, does not fluctuate  []  2. Behavior present, fluctuates (comes and goes, changes in severity)    Mood    \"Over the last 2 weeks, have you been bothered by any of the following problems?\" 1. Symptom Presence    0 = No  1 = Yes  9 = No Response 2.  Symptom Frequency    0 = Never or 1 day  1 = 2-6 days (several days)  2 = 7-11 days (half or more of the days)  3 = 12-14 days (nearly every day)  **Leave blank if 'No Reponse'**      Enter scores in boxes    Column 1 Column 2   Little interest or pleasure in doing things   [x] 0. No  [] 1. Yes  [] 9. No Response [] 0. Never or one day  [] 1.  2-6 days (several days)  [] 2.  7-11 days (half or more of days)  [] 3.  12-14 days (nearly every day)     Feeling down, depressed, or hopeless   [x] 0. No  [] 1. Yes  [] 9. No Response [] 0. Never or one day  [] 1.  2-6 days (several days)  [] 2.  7-11 days (half or more of days)  [] 3.  12-14 days (nearly every day)   **If Question A or B in column 2 is coded 2 or 3, CONTINUE asking the questions below in box. If not, SKIP down to [facility]" question and continue**       Trouble falling or staying asleep, or sleeping too much   [] 0. No  [] 1. Yes  [] 9. No Response [] 0. Never or one day  [] 1.  2-6 days (several days)  [] 2.  7-11 days (half or more of days)  [] 3.  12-14 days (nearly every day   Feeling tired or having little energy   [] 0. No  [] 1. Yes  [] 9. No Response [] 0. Never or one day  [] 1.  2-6 days (several days)  [] 2.  7-11 days (half or more of days)  [] 3.  12-14 days (nearly every day   Poor appetite or overeating     [] 0. No  [] 1. Yes  [] 9. No Response [] 0. Never or one day  [] 1.  2-6 days (several days)  [] 2.  7-11 days (half or more of days)  [] 3.  12-14 days (nearly every day   Feeling bad about yourself - or that you are a failure or have let yourself or your family down   [] 0. No  [] 1. Yes  [] 9. No Response [] 0. Never or one day  [] 1.  2-6 days (several days)  [] 2.  7-11 days (half or more of days)  [] 3.  12-14 days (nearly every day   Trouble concentrating on things, such as reading the newspaper or watching television   [] 0. No  [] 1. Yes  [] 9. No Response [] 0. Never or one day  [] 1.  2-6 days (several days)  [] 2.  7-11 days (half or more of days)  [] 3.  12-14 days (nearly every day   Moving or speaking so slowly that other people could have noticed.   Or the opposite- being so fidgety or restless that you have been moving around a lot more than usual.   [] 0. No  [] 1. Yes  [] 9. No Response [] 0. Never or one day  [] 1.  2-6 days (several days)  [] 2.  7-11 days (half or more of days)  [] 3.  12-14 days (nearly every day   Thoughts that you would be better off dead, or of hurting yourself in some way.   [] 0. No  [] 1. Yes  [] 9. No Response [] 0. Never or one day  [] 1.  2-6 days (several days)  [] 2.  7-11 days (half or more of days)  [] 3.  12-14 days (nearly every day     Social Isolation  \"How often do you feel lonely or isolated from those around you? \"  [x] 0. Never  [] 1. Rarely  [] 2. Sometimes  [] 3. Often  [] 4. Always  [] 8. Patient unable to respond    Pain Effect on Sleep  \"Over the past 5 days, how much of the time has pain made it hard for you to sleep at night? \"  [x]  0. Does not apply - I have not had any pain or hurting in the past 5 days  []  1. Rarely or not at all  []  2. Occasionally  []  3. Frequently  []  4. Almost constantly  []  8. Unable to answer  **If the patient answers \"0. Does not apply\" to this question, skip the next two \"Pain Effect. Aleisha Dukes \" questions**      Pain Interference with Therapy Activities  \"Over the past 5 days, how often have you limited your participation in rehabilitation therapy sessions due to pain? \"  []  1. Rarely or not at all  []  2. Occasionally  []  3. Frequently  []  4. Almost constantly  []  8. Unable to answer    Pain Interference with Day-to-Day Activities: \"Over the past 5 days, how often have you limited your day-to-day activities (excluding rehabilitation therapy session)? \"  []  1. Rarely or not at all  []  2. Occasionally  []  3. Frequently  []  4. Almost constantly  []  8.   Unable to answer

## 2023-02-01 NOTE — PROGRESS NOTES
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 Reports her family is getting a ramp installed today. Prior (baseline) level of function: Independent. Current level of function:         Current  IRF-SNOW and Goals:   Occupational Therapy:    Short Term Goals  Time Frame for Short Term Goals: STGs=LTGs :   Long Term Goals  Time Frame for Long Term Goals : 10-12 days or until d/c. Long Term Goal 1: Pt will complete oral hygiene and grooming tasks with IND. Long Term Goal 2: Pt will complete total body bathing with AE PRN and Mod I.  Long Term Goal 3: Pt will complete UB dressing with IND. Long Term Goal 4: Pt will complete LB dressing with AE PRN and Mod I.  Long Term Goal 5: Pt will doff/don footwear with AE PRN and Mod I. Additional Goals?: Yes  Long Term Goal 6: Pt will complete toileting with IND.   Long Term Goal 7: Pt will complete functional transfers (bed, chair, toilet, shower) with DME PRN and Mod I.  Long Term Goal 8: Pt will perform therex/therax to facilitate an increase in strength/endurance with emphasis on dynamic standing balance/tolerance > 8 mins with Mod I.  Long Term Goal 9: Pt will perform meal prep tasks with Mod I. :                                       Eating: Eating  Assistance Needed: Independent  Comment: X  CARE Score: 6  Discharge Goal: Set-up or clean-up assistance       Oral Hygiene: Oral Hygiene  Assistance Needed: Independent  Comment: X  CARE Score: 6  Discharge Goal: Independent    UB/LB Bathing: Shower/Bathe Self  Assistance Needed: Independent  Comment: X  CARE Score: 6  Discharge Goal: Independent    UB Dressing: Upper Body Dressing  Assistance Needed: Independent  Comment: X  CARE Score: 6  Discharge Goal: Independent         LB Dressing: Lower Body Dressing  Assistance Needed: Independent  Comment: X  CARE Score: 6  Discharge Goal: Independent    Donning and [place] Footwear: Putting On/Taking Off Footwear  Assistance Needed: Independent  Comment: X  CARE Score: 6  Discharge Goal: Independent      Toiletin [address] needed: Independent  Comment: X  CARE Score: 6  Discharge Goal: Independent      Toilet Transfers: Toilet Transfer  Assistance needed: Independent  Comment: Mod Ind with rollator and BSC frame over toilet  CARE Score: 6  Discharge Goal: Independent    Physical Therapy:         Long Term Goals  Time Frame for Long Term Goals : 10 tx days:  Long Term Goal 1: Pt will complete bed mobility (scooting, rolling R/L, and sup<->sit) Ind.  Long Term Goal 2: Pt will complete OOB transfers using 2WW/rollator Mod Ind. Long Term Goal 3: Pt will ambulate 10 ft and 50 ft with turns over level  surface using 2WW Mod Ind. Long Term Goal 4: Pt will ambulate >/=150 ft over level surface and 10 ft of uneven surface using rollator with SBA-Sup. Long Term Goal 5: Pt will ascend/descend curb step (2\"/4\" step height) and 4-5 steps using railings with Min A. Additional Goals?: Yes  Long term goal 6: Pt will complete object retrieval from the floor using 2WW/rollator and reacher Mod Ind.       Bed Mobility:   Sit to Lying  Assistance Needed: Independent  Comment: Ind in regular bed  CARE Score: 6  Discharge Goal: Independent  Roll Left and Right  Assistance Needed: Independent  Comment: Ind in regular bed  Reason if not Attempted: Not attempted due to medical condition or safety concerns  CARE Score: 6  Discharge Goal: Independent  Lying to Sitting on Side of Bed  Assistance Needed: Independent  Comment: Ind in regular bed  CARE Score: 6  Discharge Goal: Independent    Transfers:    Sit to Stand  Assistance Needed: Independent  Comment: Mod Ind with rollator  CARE Score: 6  Discharge Goal: Independent  Chair/Bed-to-Chair Transfer  Assistance Needed: Independent  Comment: Mod Ind with rollator  CARE Score: 6  Discharge Goal: Independent  Toilet Transfer  Assistance needed: Independent  Comment: Mod Ind with rollator and BSC frame over toilet  CARE Score: 6  Car Transfer  Assistance Needed: Independent  Comment: Mod Ind with rollator  CARE Score: 6  Discharge Goal: Independent    Ambulation:    Walking Ability  Does the Patient Walk?: Yes     Walk 10 Feet  Assistance Needed: Independent  Comment: Mod Ind using rollator and regular shoes (gait quality consistently steady)  CARE Score: 6  Discharge Goal: Independent     Walk 50 Feet with Two Turns  Assistance Needed: Independent  Comment: Mod Ind using rollator and regular shoes (gait quality consistently steady)  CARE Score: 6  Discharge Goal: Independent     Walk 150 Feet  Assistance Needed: Independent  Comment:  Mod Ind using rollator and regular shoes (gait quality consistently steady); pt was able to ambulate up to 364 ft consecutively  CARE Score: 6  Discharge Goal: Supervision or touching assistance     Walking 10 Feet on Uneven Surfaces  Assistance Needed: Supervision or touching assistance  Comment: SBA using rollator  CARE Score: 4  Discharge Goal: Supervision or touching assistance     1 Step (Curb)  Assistance Needed: Supervision or touching assistance  Comment: CGA using rollator; pt progressed to being able to ascend/descend 4\" curb step today  Reason if not Attempted: Patient refused  CARE Score: 4  Discharge Goal: Partial/moderate assistance     4 Steps  Assistance Needed: Supervision or touching assistance  Comment: CGA with both hands holding L railing on ascent and railings on descent with step-to pattern leading with LLE on ascent and RLE on descent following pt's established routine; pt had to slightly position at an angle on descent due to her impaired strength and balance  Reason if not Attempted: Not attempted due to medical condition or safety concerns  CARE Score: 4  Discharge Goal: Partial/moderate assistance     12 Steps  Assistance Needed: Supervision or touching assistance  Comment: pt progressed to being able to complete this mobility task for 2 consecutive days; CGA with both hands holding L railing on ascent and railings on descent with step-to pattern leading with LLE on ascent and RLE on descent following pt's established routine; pt had to slightly position at an angle on descent due to her impaired strength and balance  Reason if not Attempted: Not attempted due to medical condition or safety concerns  CARE Score: 4  Discharge Goal: Not Attempted       Wheelchair:  w/c Ability: Wheelchair Ability  Uses a Wheelchair and/or Scooter?: No                Balance:        Object: Picking Up Object  Assistance Needed: Independent  Comment: Mod Ind with rollator and reacher  CARE Score: 6  Discharge Goal: Independent    I      Exam:    Blood pressure (!) 145/83, pulse 80, temperature 97.7 °F (36.5 °C), temperature source Oral, resp. rate 16, height 5' 4\" (1.626 m), weight 135 lb 9.3 oz (61.5 kg), SpO2 93 %. General: Sitting up in bedside chair. Talkative. In good spirits. Answering direct questions. HEENT: Neck supple. Clear speech. MMM. Symmetric facial expression. Pulmonary: No wheezes, rales or coughing. Cardiac: RRR. Abdomen: Patient's abdomen is soft and nondistended. Bowel sounds were present throughout. There was no rebound, guarding or masses noted. Upper extremities: No bruising or swelling. Significant weakness. Lower extremities: Distal greater than proximal weakness. No new swelling or discoloration. Sitting balance was good.   Standing balance was fair-.    Lab Results   Component Value Date    WBC 3.6 (L) 01/26/2023    HGB 11.0 (L) 01/26/2023 HCT 33.1 (L) 01/26/2023    MCV 94.3 01/26/2023     01/26/2023     No results found for: INR, PROTIME  Lab Results   Component Value Date    CREATININE 0.7 01/26/2023    BUN 18 01/26/2023     (L) 01/26/2023    K 3.9 01/26/2023    CL 99 01/26/2023    CO2 29 01/26/2023     Lab Results   Component Value Date    ALT 20 01/21/2023    AST 23 01/21/2023    ALKPHOS 67 01/21/2023    BILITOT 0.7 01/21/2023       Expected length of stay  prior to a supervised level of function for discharge home with a walker and [facility] OT/PT is 2/1/2023. Recommendations:    Inclusion body myositis: We will transition to a home-based therapy and nursing plan of recovery with discharge from the rehab unit tomorrow. She has been weaned from supplemental oxygen during the day. She is tolerating her current medicines and showing reasonable safety with use of a walker for pivot transfers and self-care activities. Caregiver training has been completed. She is tolerating her current strategy for pain management. Outpatient follow-up with her PCP in 1 week. Neurology will see her per their usual routine. Verbal cues and SBA for transfers. DVT prophylaxis: Lovenox 40 mg subcu daily has been protective from DVT and has not grossly worsened her pancytopenia. Weightbearing activities have improved to the point without protection often I will stop the Lovenox now. No new bruising or swelling. Uncontrolled pain: She is tolerating the acetaminophen and oxycodone for her pain related to her rib fractures, toe fractures and myositis. Tolerating her scheduled acetaminophen. Diathermy and frequent repositioning are available. Bowel intervention while on the analgesics has been productive. Dysphagia: A soft and bite-size diet is required at this point. Speech-language pathology is working on swallowing technique recovery. Upright for meds and meals. Thin liquids have been tolerated reasonably well.    Acute kidney injury: Minimizing fluctuations of blood pressure and the use of nephrotoxic medications. Serial chemistries to monitor renal function and the status of her hypokalemia. Supplementing her potassium as needed. Encouraging consistent oral hydration. Hemoglobin 11.0. Low normal white count. .  Rib and toe fractures: Weightbearing as tolerated. Pain management. Incentive spirometry. Nocturnal oxygen desaturations noted. Will increase ventilation with nebulizers and encourage spirometry use. Hypertension: Norvasc is required to control her systolic blood pressure. Target systolic blood pressure ranges 120-140. Vital signs are checked at rest and with activity. Her blood pressure is really within the target range today. Monitoring closely. IBS: Regular bowel intervention with Colace and Senokot. [place] food choices encouraged. Zofran as needed.

## 2023-02-01 NOTE — PROGRESS NOTES
02/01/23 1343   Encounter Summary   Encounter Overview/Reason  Volunteer Encounter   Service Provided For: Patient   Referral/Consult From: Other    Support System Spouse   Last Encounter  02/08/23  (Volunteer visit by Sage Read; documented by Paulo Toro)   Complexity of Encounter Low   Begin Time 0900   End Time  0905   Total Time Calculated 5 min   Encounter    Type Follow up   Spiritual/Emotional needs   Type Spiritual Support   Rituals, Rites and Sacraments   Type Baptism Communion   Assessment/Intervention/Outcome   Assessment Calm;Coping   Intervention Active listening;Empowerment   Outcome Acceptance;Comfort;Encouraged   Plan and Referrals   Plan/Referrals Continue to visit, (comment)

## 2023-02-05 NOTE — DISCHARGE SUMMARY
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 She was diagnosed with inclusion body myositis about 2 years ago. She follows with neurology out of town. On 2023 she was brought to the hospital after suffering a series of falls at home. She states her legs just give out where she trips because of her right foot drop. She does not believe she passed out, had palpitations or loss of consciousness after the fall. She does not feel like she injured her head or neck. In our ED she had acute kidney injury with hypokalemia, left rib fracture and multiple phalangeal fractures in the feet. She also struggled with pancytopenia which was a chronic accompaniment of her myositis. Prior (baseline) level of function: Independent. Current level of function:         Current  IRF-SNOW and Goals:   Occupational Therapy:    Short Term Goals  Time Frame for Short Term Goals: STGs=LTGs :   Long Term Goals  Time Frame for Long Term Goals : 10-12 days or until d/c. Long Term Goal 1: Pt will complete oral hygiene and grooming tasks with IND.   Long Term Goal 2: Pt will complete total body bathing with AE PRN and Mod I.  Long Term Goal 3: Pt will complete UB dressing with IND. Long Term Goal 4: Pt will complete LB dressing with AE PRN and Mod I.  Long Term Goal 5: Pt will doff/don footwear with AE PRN and Mod I. Additional Goals?: Yes  Long Term Goal 6: Pt will complete toileting with IND. Long Term Goal 7: Pt will complete functional transfers (bed, chair, toilet, shower) with DME PRN and Mod I.  Long Term Goal 8: Pt will perform therex/therax to facilitate an increase in strength/endurance with emphasis on dynamic standing balance/tolerance > 8 mins with Mod I.  Long Term Goal 9: Pt will perform meal prep tasks with Mod I. :                                       Eating: Eating  Assistance Needed: Independent  Comment: X  CARE Score: 6  Discharge Goal: Set-up or clean-up assistance       Oral Hygiene: Oral Hygiene  Assistance Needed: Independent  Comment: X  CARE Score: 6  Discharge Goal: Independent    UB/LB Bathing: Shower/Bathe Self  Assistance Needed: Independent  Comment: X  CARE Score: 6  Discharge Goal: Independent    UB Dressing: Upper Body Dressing  Assistance Needed: Independent  Comment: X  CARE Score: 6  Discharge Goal: Independent         LB Dressing: Lower Body Dressing  Assistance Needed: Independent  Comment: X  CARE Score: 6  Discharge Goal: Independent    Donning and [place] Footwear: Putting On/Taking Off Footwear  Assistance Needed: Independent  Comment: X  CARE Score: 6  Discharge Goal: Independent      Toiletin [address] needed: Independent  Comment: X  CARE Score: 6  Discharge Goal: Independent      Toilet Transfers: Toilet Transfer  Assistance needed: Independent  Comment:  Mod Ind with rollator and BSC frame over toilet  CARE Score: 6  Discharge Goal: Independent    Physical Therapy:         Long Term Goals  Time Frame for Long Term Goals : 10 tx days:  Long Term Goal 1: Pt will complete bed mobility (scooting, rolling R/L, and sup<->sit) Ind.  Long Term Goal 2: Pt will complete OOB transfers using 2WW/rollator Mod Ind. Long Term Goal 3: Pt will ambulate 10 ft and 50 ft with turns over level  surface using 2WW Mod Ind. Long Term Goal 4: Pt will ambulate >/=150 ft over level surface and 10 ft of uneven surface using rollator with SBA-Sup. Long Term Goal 5: Pt will ascend/descend curb step (2\"/4\" step height) and 4-5 steps using railings with Min A. Additional Goals?: Yes  Long term goal 6: Pt will complete object retrieval from the floor using 2WW/rollator and reacher Mod Ind. Bed Mobility:   Sit to Lying  Assistance Needed: Independent  Comment: Ind in regular bed  CARE Score: 6  Discharge Goal: Independent  Roll Left and Right  Assistance Needed: Independent  Comment: Ind in regular bed  Reason if not Attempted: Not attempted due to medical condition or safety concerns  CARE Score: 6  Discharge Goal: Independent  Lying to Sitting on Side of Bed  Assistance Needed: Independent  Comment: Ind in regular bed  CARE Score: 6  Discharge Goal: Independent    Transfers:    Sit to Stand  Assistance Needed: Independent  Comment: Mod Ind with rollator  CARE Score: 6  Discharge Goal: Independent  Chair/Bed-to-Chair Transfer  Assistance Needed: Independent  Comment: Mod Ind with rollator  CARE Score: 6  Discharge Goal: Independent  Toilet Transfer  Assistance needed: Independent  Comment: Mod Ind with rollator and BSC frame over toilet  CARE Score: 6  Car Transfer  Assistance Needed: Independent  Comment: Mod Ind with rollator  CARE Score: 6  Discharge Goal: Independent    Ambulation:    Walking Ability  Does the Patient Walk?: Yes     Walk 10 Feet  Assistance Needed: Independent  Comment: Mod Ind using rollator and regular shoes (gait quality consistently steady)  CARE Score: 6  Discharge Goal: Independent     Walk 50 Feet with Two Turns  Assistance Needed: Independent  Comment:  Mod Ind using rollator and regular shoes (gait quality consistently steady)  CARE Score: 6  Discharge Goal: Independent     Walk 150 Feet  Assistance Needed: Independent  Comment:  Mod Ind using rollator and regular shoes (gait quality consistently steady); pt was able to ambulate up to 364 ft consecutively  CARE Score: 6  Discharge Goal: Supervision or touching assistance     Walking 10 Feet on Uneven Surfaces  Assistance Needed: Supervision or touching assistance  Comment: SBA using rollator  CARE Score: 4  Discharge Goal: Supervision or touching assistance     1 Step (Curb)  Assistance Needed: Supervision or touching assistance  Comment: CGA using rollator; pt progressed to being able to ascend/descend 4\" curb step today  Reason if not Attempted: Patient refused  CARE Score: 4  Discharge Goal: Partial/moderate assistance     4 Steps  Assistance Needed: Supervision or touching assistance  Comment: CGA with both hands holding L railing on ascent and railings on descent with step-to pattern leading with LLE on ascent and RLE on descent following pt's established routine; pt had to slightly position at an angle on descent due to her impaired strength and balance  Reason if not Attempted: Not attempted due to medical condition or safety concerns  CARE Score: 4  Discharge Goal: Partial/moderate assistance     12 Steps  Assistance Needed: Supervision or touching assistance  Comment: pt progressed to being able to complete this mobility task for 2 consecutive days; CGA with both hands holding L railing on ascent and railings on descent with step-to pattern leading with LLE on ascent and RLE on descent following pt's established routine; pt had to slightly position at an angle on descent due to her impaired strength and balance  Reason if not Attempted: Not attempted due to medical condition or safety concerns  CARE Score: 4  Discharge Goal: Not Attempted       Wheelchair:  w/c Ability: Wheelchair Ability  Uses a Wheelchair and/or Scooter?: No                Balance:        Object: Picking Up Object  Assistance Needed: Independent  Comment: Mod Ind with rollator and reacher  CARE Score: 6  Discharge Goal: Independent    I      Exam:    Blood pressure 136/83, pulse 88, temperature 97.5 °F (36.4 °C), temperature source Oral, resp. rate 18, height 5' 4\" (1.626 m), weight 136 lb 0.4 oz (61.7 kg), SpO2 98 %. General: Sitting upright in a wheelchair. Demonstrating good core control. Talkative. In good spirits. Answering direct questions. HEENT: Gazing right and left. Clear speech. MMM. Neck supple. Pulmonary: Unlabored without wheezes, rales or coughing. Cardiac: Regular rate and rhythm. Abdomen: Patient's abdomen is soft and nondistended. Bowel sounds were present throughout. There was no rebound, guarding or masses noted. Upper extremities: Cautiously squeezes with both hands. No bruising or swelling. Significant weakness. Lower extremities: Distal greater than proximal weakness. No new swelling or discoloration. Sitting balance was good. Standing balance was fair-.    Lab Results   Component Value Date    WBC 3.6 (L) 01/26/2023    HGB 11.0 (L) 01/26/2023    HCT 33.1 (L) 01/26/2023    MCV 94.3 01/26/2023     01/26/2023     No results found for: INR, PROTIME  Lab Results   Component Value Date    CREATININE 0.7 01/26/2023    BUN 18 01/26/2023     (L) 01/26/2023    K 3.9 01/26/2023    CL 99 01/26/2023    CO2 29 01/26/2023     Lab Results   Component Value Date    ALT 20 01/21/2023    AST 23 01/21/2023    ALKPHOS 67 01/21/2023    BILITOT 0.7 01/21/2023         The patient presented to the ARU with the above history requiring a multidisciplinary treatment plan including close medical supervision by the [facility] Director. The patient participated in the prescribed therapy treatment plan with reasonable compliance and progressive tolerance. They avoided significant medical complications.     By the time of discharge the patient had become safer with adaptive equipment to transfer and toilet with a FWW with the supervision of family/caregivers. The patient was tolerating an oral diet without choking/coughing and was back to their baseline with regards to bowel and bladder control. Discharge instructions were reviewed with the patient and her spouse. The patient is to follow up with the PCP in 1 week. No driving. [facility] PT/OT/RN will be arranged. High Risk Drug Classes:  Use and Indication    Is taking: Check if the patient is taking any medications (or has them prescribed) by pharmacological classification, not how it is used, in the following classes  Indication noted: If column 1 is checked, check if there is an indication noted for all meds in the drug class Is taking  (check all that apply) Indication noted (check all that apply)   Antipsychotic [] []   Anticoagulant [x] [x]   Antibiotic [] []   Opioid [x] [x]   Antiplatelet [] []   Hypoglycemic (including insulin) [] []   None of the above []        Medication List        START taking these medications      amLODIPine 5 MG tablet  Commonly known as: NORVASC  Take 1 tablet by mouth daily     docusate 100 MG Caps  Commonly known as: COLACE, DULCOLAX  Take 100 mg by mouth daily     lidocaine 4 % external patch  Place 1 patch onto the skin daily     oxyCODONE 5 MG immediate release tablet  Commonly known as: ROXICODONE  Take 1 tablet by mouth every 6 hours as needed for Pain for up to 7 days.  Max Daily Amount: 20 mg     senna 8.6 MG tablet  Commonly known as: SENOKOT  Take 1 tablet by mouth nightly     tiZANidine 4 MG tablet  Commonly known as: ZANAFLEX  Take 1 tablet by mouth every 6 hours as needed (Muscle pains)            CHANGE how you take these medications      pantoprazole 40 MG tablet  Commonly known as: PROTONIX  Take 1 tablet by mouth every morning (before breakfast)  What changed: when to take this            STOP taking these medications      lisinopril 10 MG tablet  Commonly known as: AROLDO     Vitamin D3 50 MCG (2000 UT) Caps               Where to Get Your Medications        You can get these medications from any pharmacy    Bring a paper prescription for each of these medications  amLODIPine 5 MG tablet  lidocaine 4 % external patch  oxyCODONE 5 MG immediate release tablet  pantoprazole 40 MG tablet  tiZANidine 4 MG tablet  You don't need a prescription for these medications  docusate 100 MG Caps  senna 8.6 MG tablet           CONDITION ON DISCHARGE: Stable. The prognosis is fair for further improvements in ADL's and safety with adapted gait/transfers. Record review, patient exam, discharge instructions, medication reconciliation and summary for this discharge visit took more than 30 minutes.

## 2023-02-05 NOTE — H&P
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[address])     Comorbid diagnoses impacting rehabilitation: Generalized weakness, gait disturbance, dysphagia, acute kidney injury, hypokalemia, left 10th rib fracture, essential hypertension, IBS, fractured toes of both feet, pancytopenia    Discharging diagnosis:  Inclusion body myositis ( [place] [address])     Comorbid diagnoses impacting rehabilitation: Generalized weakness, gait disturbance, dysphagia, acute kidney injury, hypokalemia, left 10th rib fracture, essential hypertension, IBS, fractured toes of both feet, pancytopenia     History of present illness: The patient is a 27-year-old right-hand-dominant female who has been struggling with progressive for extremity weakness over the last several years. She was diagnosed with inclusion body myositis about 2 years ago. She follows with neurology out of town. On 2023 she was brought to the hospital after suffering a series of falls at home. She states her legs just give out where she trips because of her right foot drop. She does not believe she passed out, had palpitations or loss of consciousness after the fall. She does not feel like she injured her head or neck. In our ED she had acute kidney injury with hypokalemia, left rib fracture and multiple phalangeal fractures in the feet. She also struggled with pancytopenia which was a chronic accompaniment of her myositis. Prior (baseline) level of function: Independent. Current level of function:         Current  IRF-SNOW and Goals:   Occupational Therapy:    Short Term Goals  Time Frame for Short Term Goals: STGs=LTGs :   Long Term Goals  Time Frame for Long Term Goals : 10-12 days or until d/c. Long Term Goal 1: Pt will complete oral hygiene and grooming tasks with IND.   Long Term Goal 2: Pt will complete total body bathing with AE PRN and Mod I.  Long Term Goal 3: Pt will complete UB dressing with IND. Long Term Goal 4: Pt will complete LB dressing with AE PRN and Mod I.  Long Term Goal 5: Pt will doff/don footwear with AE PRN and Mod I. Additional Goals?: Yes  Long Term Goal 6: Pt will complete toileting with IND. Long Term Goal 7: Pt will complete functional transfers (bed, chair, toilet, shower) with DME PRN and Mod I.  Long Term Goal 8: Pt will perform therex/therax to facilitate an increase in strength/endurance with emphasis on dynamic standing balance/tolerance > 8 mins with Mod I.  Long Term Goal 9: Pt will perform meal prep tasks with Mod I. :                                       Eating: Eating  Assistance Needed: Independent  Comment: X  CARE Score: 6  Discharge Goal: Set-up or clean-up assistance       Oral Hygiene: Oral Hygiene  Assistance Needed: Independent  Comment: X  CARE Score: 6  Discharge Goal: Independent    UB/LB Bathing: Shower/Bathe Self  Assistance Needed: Independent  Comment: X  CARE Score: 6  Discharge Goal: Independent    UB Dressing: Upper Body Dressing  Assistance Needed: Independent  Comment: X  CARE Score: 6  Discharge Goal: Independent         LB Dressing: Lower Body Dressing  Assistance Needed: Independent  Comment: X  CARE Score: 6  Discharge Goal: Independent    Donning and [place] Footwear: Putting On/Taking Off Footwear  Assistance Needed: Independent  Comment: X  CARE Score: 6  Discharge Goal: Independent      Toiletin [address] needed: Independent  Comment: X  CARE Score: 6  Discharge Goal: Independent      Toilet Transfers: Toilet Transfer  Assistance needed: Independent  Comment:  Mod Ind with rollator and BSC frame over toilet  CARE Score: 6  Discharge Goal: Independent    Physical Therapy:         Long Term Goals  Time Frame for Long Term Goals : 10 tx days:  Long Term Goal 1: Pt will complete bed mobility (scooting, rolling R/L, and sup<->sit) Ind.  Long Term Goal 2: Pt will complete OOB transfers using 2WW/rollator Mod Ind. Long Term Goal 3: Pt will ambulate 10 ft and 50 ft with turns over level  surface using 2WW Mod Ind. Long Term Goal 4: Pt will ambulate >/=150 ft over level surface and 10 ft of uneven surface using rollator with SBA-Sup. Long Term Goal 5: Pt will ascend/descend curb step (2\"/4\" step height) and 4-5 steps using railings with Min A. Additional Goals?: Yes  Long term goal 6: Pt will complete object retrieval from the floor using 2WW/rollator and reacher Mod Ind. Bed Mobility:   Sit to Lying  Assistance Needed: Independent  Comment: Ind in regular bed  CARE Score: 6  Discharge Goal: Independent  Roll Left and Right  Assistance Needed: Independent  Comment: Ind in regular bed  Reason if not Attempted: Not attempted due to medical condition or safety concerns  CARE Score: 6  Discharge Goal: Independent  Lying to Sitting on Side of Bed  Assistance Needed: Independent  Comment: Ind in regular bed  CARE Score: 6  Discharge Goal: Independent    Transfers:    Sit to Stand  Assistance Needed: Independent  Comment: Mod Ind with rollator  CARE Score: 6  Discharge Goal: Independent  Chair/Bed-to-Chair Transfer  Assistance Needed: Independent  Comment: Mod Ind with rollator  CARE Score: 6  Discharge Goal: Independent  Toilet Transfer  Assistance needed: Independent  Comment: Mod Ind with rollator and BSC frame over toilet  CARE Score: 6  Car Transfer  Assistance Needed: Independent  Comment: Mod Ind with rollator  CARE Score: 6  Discharge Goal: Independent    Ambulation:    Walking Ability  Does the Patient Walk?: Yes     Walk 10 Feet  Assistance Needed: Independent  Comment: Mod Ind using rollator and regular shoes (gait quality consistently steady)  CARE Score: 6  Discharge Goal: Independent     Walk 50 Feet with Two Turns  Assistance Needed: Independent  Comment:  Mod Ind using rollator and regular shoes (gait quality consistently steady)  CARE Score: 6  Discharge Goal: Independent     Walk 150 Feet  Assistance Needed: Independent  Comment:  Mod Ind using rollator and regular shoes (gait quality consistently steady); pt was able to ambulate up to 364 ft consecutively  CARE Score: 6  Discharge Goal: Supervision or touching assistance     Walking 10 Feet on Uneven Surfaces  Assistance Needed: Supervision or touching assistance  Comment: SBA using rollator  CARE Score: 4  Discharge Goal: Supervision or touching assistance     1 Step (Curb)  Assistance Needed: Supervision or touching assistance  Comment: CGA using rollator; pt progressed to being able to ascend/descend 4\" curb step today  Reason if not Attempted: Patient refused  CARE Score: 4  Discharge Goal: Partial/moderate assistance     4 Steps  Assistance Needed: Supervision or touching assistance  Comment: CGA with both hands holding L railing on ascent and railings on descent with step-to pattern leading with LLE on ascent and RLE on descent following pt's established routine; pt had to slightly position at an angle on descent due to her impaired strength and balance  Reason if not Attempted: Not attempted due to medical condition or safety concerns  CARE Score: 4  Discharge Goal: Partial/moderate assistance     12 Steps  Assistance Needed: Supervision or touching assistance  Comment: pt progressed to being able to complete this mobility task for 2 consecutive days; CGA with both hands holding L railing on ascent and railings on descent with step-to pattern leading with LLE on ascent and RLE on descent following pt's established routine; pt had to slightly position at an angle on descent due to her impaired strength and balance  Reason if not Attempted: Not attempted due to medical condition or safety concerns  CARE Score: 4  Discharge Goal: Not Attempted       Wheelchair:  w/c Ability: Wheelchair Ability  Uses a Wheelchair and/or Scooter?: No                Balance:        Object: Picking Up Object  Assistance Needed: Independent  Comment: Mod Ind with rollator and reacher  CARE Score: 6  Discharge Goal: Independent    I      Exam:    Blood pressure 136/83, pulse 88, temperature 97.5 °F (36.4 °C), temperature source Oral, resp. rate 18, height 5' 4\" (1.626 m), weight 136 lb 0.4 oz (61.7 kg), SpO2 98 %. General: Sitting upright in a wheelchair. Demonstrating good core control. Talkative. In good spirits. Answering direct questions. HEENT: Gazing right and left. Clear speech. MMM. Neck supple. Pulmonary: Unlabored without wheezes, rales or coughing. Cardiac: Regular rate and rhythm. Abdomen: Patient's abdomen is soft and nondistended. Bowel sounds were present throughout. There was no rebound, guarding or masses noted. Upper extremities: Cautiously squeezes with both hands. No bruising or swelling. Significant weakness. Lower extremities: Distal greater than proximal weakness. No new swelling or discoloration. Sitting balance was good. Standing balance was fair-.    Lab Results   Component Value Date    WBC 3.6 (L) 01/26/2023    HGB 11.0 (L) 01/26/2023    HCT 33.1 (L) 01/26/2023    MCV 94.3 01/26/2023     01/26/2023     No results found for: INR, PROTIME  Lab Results   Component Value Date    CREATININE 0.7 01/26/2023    BUN 18 01/26/2023     (L) 01/26/2023    K 3.9 01/26/2023    CL 99 01/26/2023    CO2 29 01/26/2023     Lab Results   Component Value Date    ALT 20 01/21/2023    AST 23 01/21/2023    ALKPHOS 67 01/21/2023    BILITOT 0.7 01/21/2023         The patient presented to the ARU with the above history requiring a multidisciplinary treatment plan including close medical supervision by the Kym Curtis Director. The patient participated in the prescribed therapy treatment plan with reasonable compliance and progressive tolerance. They avoided significant medical complications.     By the time of discharge the patient had become safer with adaptive equipment to transfer and toilet with a FWW with the supervision of family/caregivers. The patient was tolerating an oral diet without choking/coughing and was back to their baseline with regards to bowel and bladder control. Discharge instructions were reviewed with the patient and her spouse. The patient is to follow up with the PCP in 1 week. No driving. [facility] PT/OT/RN will be arranged. High Risk Drug Classes:  Use and Indication    Is taking: Check if the patient is taking any medications (or has them prescribed) by pharmacological classification, not how it is used, in the following classes  Indication noted: If column 1 is checked, check if there is an indication noted for all meds in the drug class Is taking  (check all that apply) Indication noted (check all that apply)   Antipsychotic [] []   Anticoagulant [x] [x]   Antibiotic [] []   Opioid [x] [x]   Antiplatelet [] []   Hypoglycemic (including insulin) [] []   None of the above []        Medication List        START taking these medications      amLODIPine 5 MG tablet  Commonly known as: NORVASC  Take 1 tablet by mouth daily     docusate 100 MG Caps  Commonly known as: COLACE, DULCOLAX  Take 100 mg by mouth daily     lidocaine 4 % external patch  Place 1 patch onto the skin daily     oxyCODONE 5 MG immediate release tablet  Commonly known as: ROXICODONE  Take 1 tablet by mouth every 6 hours as needed for Pain for up to 7 days.  Max Daily Amount: 20 mg     senna 8.6 MG tablet  Commonly known as: SENOKOT  Take 1 tablet by mouth nightly     tiZANidine 4 MG tablet  Commonly known as: ZANAFLEX  Take 1 tablet by mouth every 6 hours as needed (Muscle pains)            CHANGE how you take these medications      pantoprazole 40 MG tablet  Commonly known as: PROTONIX  Take 1 tablet by mouth every morning (before breakfast)  What changed: when to take this            STOP taking these medications      lisinopril 10 MG tablet  Commonly known as: AROLDO     Vitamin D3 50 MCG (2000 UT) Caps               Where to Get Your Medications        You can get these medications from any pharmacy    Bring a paper prescription for each of these medications  amLODIPine 5 MG tablet  lidocaine 4 % external patch  oxyCODONE 5 MG immediate release tablet  pantoprazole 40 MG tablet  tiZANidine 4 MG tablet  You don't need a prescription for these medications  docusate 100 MG Caps  senna 8.6 MG tablet           CONDITION ON DISCHARGE: Stable.    The prognosis is fair for further improvements in ADL's and safety with adapted gait/transfers.     Record review, patient exam, discharge instructions, medication reconciliation and summary for this discharge visit took more than 30 minutes.

## 2023-02-27 ENCOUNTER — OFFICE VISIT (OUTPATIENT)
Dept: ORTHOPEDIC SURGERY | Age: 73
End: 2023-02-27
Payer: MEDICARE

## 2023-02-27 VITALS
HEART RATE: 76 BPM | HEIGHT: 64 IN | BODY MASS INDEX: 22.53 KG/M2 | WEIGHT: 132 LBS | SYSTOLIC BLOOD PRESSURE: 116 MMHG | RESPIRATION RATE: 16 BRPM | OXYGEN SATURATION: 97 % | DIASTOLIC BLOOD PRESSURE: 78 MMHG

## 2023-02-27 DIAGNOSIS — S92.325A CLOSED NONDISPLACED FRACTURE OF SECOND METATARSAL BONE OF LEFT FOOT, INITIAL ENCOUNTER: Primary | ICD-10-CM

## 2023-02-27 PROCEDURE — 3078F DIAST BP <80 MM HG: CPT | Performed by: PHYSICIAN ASSISTANT

## 2023-02-27 PROCEDURE — 1123F ACP DISCUSS/DSCN MKR DOCD: CPT | Performed by: PHYSICIAN ASSISTANT

## 2023-02-27 PROCEDURE — G8420 CALC BMI NORM PARAMETERS: HCPCS | Performed by: PHYSICIAN ASSISTANT

## 2023-02-27 PROCEDURE — 3017F COLORECTAL CA SCREEN DOC REV: CPT | Performed by: PHYSICIAN ASSISTANT

## 2023-02-27 PROCEDURE — 1036F TOBACCO NON-USER: CPT | Performed by: PHYSICIAN ASSISTANT

## 2023-02-27 PROCEDURE — G8400 PT W/DXA NO RESULTS DOC: HCPCS | Performed by: PHYSICIAN ASSISTANT

## 2023-02-27 PROCEDURE — G8427 DOCREV CUR MEDS BY ELIG CLIN: HCPCS | Performed by: PHYSICIAN ASSISTANT

## 2023-02-27 PROCEDURE — 3074F SYST BP LT 130 MM HG: CPT | Performed by: PHYSICIAN ASSISTANT

## 2023-02-27 PROCEDURE — 1090F PRES/ABSN URINE INCON ASSESS: CPT | Performed by: PHYSICIAN ASSISTANT

## 2023-02-27 PROCEDURE — 99202 OFFICE O/P NEW SF 15 MIN: CPT | Performed by: PHYSICIAN ASSISTANT

## 2023-02-27 PROCEDURE — G8484 FLU IMMUNIZE NO ADMIN: HCPCS | Performed by: PHYSICIAN ASSISTANT

## 2023-02-27 PROCEDURE — 1111F DSCHRG MED/CURRENT MED MERGE: CPT | Performed by: PHYSICIAN ASSISTANT

## 2023-02-27 NOTE — PROGRESS NOTES
Review of Systems   Constitutional: Negative. HENT: Negative. Eyes: Negative. Respiratory: Negative. Cardiovascular: Negative. Gastrointestinal: Negative. Genitourinary: Negative. Musculoskeletal:  Positive for arthralgias and myalgias. Skin: Negative. Negative for rash and wound. Neurological: Negative. Psychiatric/Behavioral: Negative. Regulo Hidalgo is a 67 y.o. female that presents to the office today for evaluation of left foot and right foot injury. Patient states that she has inclusion body myositis which causes her to have periodic falls. During 1 of those falls she injured both of her feet. She is not really having much pain now when she walks. Past Surgical History:   Procedure Laterality Date    TONSILLECTOMY         Family History   Problem Relation Age of Onset    Hypertension Mother        Social History     Socioeconomic History    Marital status:    Tobacco Use    Smoking status: Former    Smokeless tobacco: Never   Substance and Sexual Activity    Alcohol use: Yes     Comment: occ    Drug use: No       Current Outpatient Medications   Medication Sig Dispense Refill    amLODIPine (NORVASC) 5 MG tablet Take 1 tablet by mouth daily 30 tablet 0    lidocaine 4 % external patch Place 1 patch onto the skin daily 10 patch 0    docusate sodium (COLACE, DULCOLAX) 100 MG CAPS Take 100 mg by mouth daily      senna (SENOKOT) 8.6 MG tablet Take 1 tablet by mouth nightly 30 tablet 0    tiZANidine (ZANAFLEX) 4 MG tablet Take 1 tablet by mouth every 6 hours as needed (Muscle pains) 20 tablet 0    pantoprazole (PROTONIX) 40 MG tablet Take 1 tablet by mouth every morning (before breakfast) 30 tablet 0     No current facility-administered medications for this visit.        No Known Allergies    Review of Systems:  See above      Physical Exam:   Resp 16   Ht 5' 4\" (1.626 m)   Wt 132 lb (59.9 kg)   BMI 22.66 kg/m²        Gait is abnormal Gen/Psych:Examination reveals a pleasant individual in no acute distress. The patient is oriented to time, place and person. The patient's mood and affect are appropriate. Lymph: The lymphatic examination bilaterally reveals all areas to be without enlargement or induration. Skin intact without lymphadenopathy, discoloration, or abnormal temperature. Vascular: There is intact, symmetric circulation in both upper extremities. Left foot:  Mild ecchymosis present in the forefoot, mild tenderness to palpation over the forefoot      Outsiderecord review: Prior x-rays reviewed, prior ER note reviewed    Imaging studies:  3 views the left foot taken and reviewed in the office today show fracture of the second and third metatarsal neck unchanged in overall appearance compared to prior x-rays. The official read and interpretation of these x-rays will be done by the the [facility]   3 views of the right foot taken and reviewed today show no new fractures compared to prior x-rays taken previously there does appear to be a chronic fracture at the base of the proximal phalanx of the fifth toe with no new changes. Impression:    Diagnosis Orders   1. Closed nondisplaced fracture of second metatarsal bone of left foot, initial encounter                Plan:    Patient Instructions   Continue to weight bear as tolerated  Continue range of motion  Ice and elevate as needed  Tylenol or Motrin for pain  Follow up as needed    We are committed to providing you the best care possible. If you receive a survey after visiting one of our offices, please take time to share your experience concerning your physician office visit. These surveys are confidential and no health information about you is shared. We are eager to improve for you and we are counting on your feedback to help make that happen.

## 2023-02-27 NOTE — PROGRESS NOTES
Osmar Moran is a 67y.o. year old female who complains of Bilateral feet fracture. Patient states that she tripped and fell twice injuring bilateral feet. Patient states that she has been wear shoe with slight pain, but overall she is okay. Patient denies any other falls or trips to the feet.

## 2023-02-27 NOTE — PATIENT INSTRUCTIONS
Continue to weight bear as tolerated  Continue range of motion  Ice and elevate as needed  Tylenol or Motrin for pain  Follow up as needed    We are committed to providing you the best care possible. If you receive a survey after visiting one of our offices, please take time to share your experience concerning your physician office visit. These surveys are confidential and no health information about you is shared. We are eager to improve for you and we are counting on your feedback to help make that happen.

## 2023-03-13 ASSESSMENT — ENCOUNTER SYMPTOMS
EYES NEGATIVE: 1
RESPIRATORY NEGATIVE: 1
GASTROINTESTINAL NEGATIVE: 1

## 2023-03-22 ENCOUNTER — HOSPITAL ENCOUNTER (INPATIENT)
Age: 73
LOS: 6 days | Discharge: NURSING, REHAB OR LONG-TERM CARE | DRG: 493 | End: 2023-03-29
Attending: INTERNAL MEDICINE | Admitting: SURGERY
Payer: MEDICARE

## 2023-03-22 ENCOUNTER — APPOINTMENT (OUTPATIENT)
Dept: GENERAL RADIOLOGY | Age: 73
DRG: 493 | End: 2023-03-22
Payer: MEDICARE
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
## 2023-03-22 DIAGNOSIS — R05.1 ACUTE COUGH: ICD-10-CM

## 2023-03-22 DIAGNOSIS — S82.842A CLOSED BIMALLEOLAR FRACTURE OF LEFT ANKLE, INITIAL ENCOUNTER: ICD-10-CM

## 2023-03-22 LAB
BASOPHILS ABSOLUTE: 0 K/CU MM
BASOPHILS RELATIVE PERCENT: 0.5 % (ref 0–1)
DIFFERENTIAL TYPE: ABNORMAL
EOSINOPHILS ABSOLUTE: 0.1 K/CU MM
EOSINOPHILS RELATIVE PERCENT: 1.3 % (ref 0–3)
HCT VFR BLD CALC: 36.7 % (ref 37–47)
HEMOGLOBIN: 12.2 GM/DL (ref 12.5–16)
IMMATURE NEUTROPHIL %: 0.3 % (ref 0–0.43)
LYMPHOCYTES ABSOLUTE: 1 K/CU MM
LYMPHOCYTES RELATIVE PERCENT: 25.3 % (ref 24–44)
MCH RBC QN AUTO: 31.4 PG (ref 27–31)
MCHC RBC AUTO-ENTMCNC: 33.2 % (ref 32–36)
MCV RBC AUTO: 94.6 FL (ref 78–100)
MONOCYTES ABSOLUTE: 0.4 K/CU MM
MONOCYTES RELATIVE PERCENT: 10.1 % (ref 0–4)
NUCLEATED RBC %: 0 %
PDW BLD-RTO: 13.7 % (ref 11.7–14.9)
PLATELET # BLD: 165 K/CU MM (ref 140–440)
PMV BLD AUTO: 9.5 FL (ref 7.5–11.1)
RBC # BLD: 3.88 M/CU MM (ref 4.2–5.4)
SEGMENTED NEUTROPHILS ABSOLUTE COUNT: 2.4 K/CU MM
SEGMENTED NEUTROPHILS RELATIVE PERCENT: 62.5 % (ref 36–66)
TOTAL IMMATURE NEUTOROPHIL: 0.01 K/CU MM
TOTAL NUCLEATED RBC: 0 K/CU MM
WBC # BLD: 3.9 K/CU MM (ref 4–10.5)

## 2023-03-22 PROCEDURE — 99285 EMERGENCY DEPT VISIT HI MDM: CPT

## 2023-03-22 PROCEDURE — 85025 COMPLETE CBC W/AUTO DIFF WBC: CPT

## 2023-03-22 PROCEDURE — 80053 COMPREHEN METABOLIC PANEL: CPT

## 2023-03-22 PROCEDURE — 73610 X-RAY EXAM OF ANKLE: CPT

## 2023-03-23 PROBLEM — S82.842A CLOSED BIMALLEOLAR FRACTURE OF LEFT ANKLE: Status: ACTIVE | Noted: 2023-03-23

## 2023-03-23 PROBLEM — S82.842A ANKLE FRACTURE, BIMALLEOLAR, CLOSED, LEFT, INITIAL ENCOUNTER: Status: ACTIVE | Noted: 2023-03-23

## 2023-03-23 PROBLEM — M21.371 RIGHT FOOT DROP: Status: ACTIVE | Noted: 2023-03-23

## 2023-03-23 LAB
ALBUMIN SERPL-MCNC: 3.8 GM/DL (ref 3.4–5)
ALP BLD-CCNC: 74 IU/L (ref 40–129)
ALT SERPL-CCNC: 21 U/L (ref 10–40)
ANION GAP SERPL CALCULATED.3IONS-SCNC: 10 MMOL/L (ref 4–16)
AST SERPL-CCNC: 33 IU/L (ref 15–37)
BILIRUB SERPL-MCNC: 0.3 MG/DL (ref 0–1)
BUN SERPL-MCNC: 25 MG/DL (ref 6–23)
CALCIUM SERPL-MCNC: 8.9 MG/DL (ref 8.3–10.6)
CHLORIDE BLD-SCNC: 94 MMOL/L (ref 99–110)
CO2: 28 MMOL/L (ref 21–32)
CREAT SERPL-MCNC: 0.7 MG/DL (ref 0.6–1.1)
EKG ATRIAL RATE: 59 BPM
EKG DIAGNOSIS: NORMAL
EKG P AXIS: 27 DEGREES
EKG P-R INTERVAL: 202 MS
EKG Q-T INTERVAL: 464 MS
EKG QRS DURATION: 88 MS
EKG QTC CALCULATION (BAZETT): 459 MS
EKG R AXIS: -44 DEGREES
EKG T AXIS: 12 DEGREES
EKG VENTRICULAR RATE: 59 BPM
GFR SERPL CREATININE-BSD FRML MDRD: >60 ML/MIN/1.73M2
GLUCOSE BLD-MCNC: 102 MG/DL (ref 70–99)
GLUCOSE SERPL-MCNC: 102 MG/DL (ref 70–99)
LV EF: 60 %
LVEF MODALITY: NORMAL
POTASSIUM SERPL-SCNC: 4 MMOL/L (ref 3.5–5.1)
SODIUM BLD-SCNC: 132 MMOL/L (ref 135–145)
TOTAL PROTEIN: 6.7 GM/DL (ref 6.4–8.2)
TROPONIN T: 0.4 NG/ML
TROPONIN T: 0.46 NG/ML

## 2023-03-23 PROCEDURE — 6370000000 HC RX 637 (ALT 250 FOR IP): Performed by: HOSPITALIST

## 2023-03-23 PROCEDURE — 36415 COLL VENOUS BLD VENIPUNCTURE: CPT

## 2023-03-23 PROCEDURE — 82962 GLUCOSE BLOOD TEST: CPT

## 2023-03-23 PROCEDURE — 6360000002 HC RX W HCPCS: Performed by: HOSPITALIST

## 2023-03-23 PROCEDURE — 6360000002 HC RX W HCPCS: Performed by: FAMILY MEDICINE

## 2023-03-23 PROCEDURE — 84484 ASSAY OF TROPONIN QUANT: CPT

## 2023-03-23 PROCEDURE — 2580000003 HC RX 258: Performed by: HOSPITALIST

## 2023-03-23 PROCEDURE — 93010 ELECTROCARDIOGRAM REPORT: CPT | Performed by: INTERNAL MEDICINE

## 2023-03-23 PROCEDURE — 6370000000 HC RX 637 (ALT 250 FOR IP): Performed by: INTERNAL MEDICINE

## 2023-03-23 PROCEDURE — 93005 ELECTROCARDIOGRAM TRACING: CPT | Performed by: HOSPITALIST

## 2023-03-23 PROCEDURE — 6360000002 HC RX W HCPCS: Performed by: INTERNAL MEDICINE

## 2023-03-23 PROCEDURE — 6360000002 HC RX W HCPCS: Performed by: PHYSICIAN ASSISTANT

## 2023-03-23 PROCEDURE — 99223 1ST HOSP IP/OBS HIGH 75: CPT | Performed by: ORTHOPAEDIC SURGERY

## 2023-03-23 PROCEDURE — 1200000000 HC SEMI PRIVATE

## 2023-03-23 PROCEDURE — 93306 TTE W/DOPPLER COMPLETE: CPT

## 2023-03-23 PROCEDURE — 94761 N-INVAS EAR/PLS OXIMETRY MLT: CPT

## 2023-03-23 PROCEDURE — 99222 1ST HOSP IP/OBS MODERATE 55: CPT | Performed by: INTERNAL MEDICINE

## 2023-03-23 PROCEDURE — 2700000000 HC OXYGEN THERAPY PER DAY

## 2023-03-23 PROCEDURE — 2580000003 HC RX 258: Performed by: INTERNAL MEDICINE

## 2023-03-23 RX ORDER — PREDNISOLONE ACETATE 10 MG/ML
1 SUSPENSION/ DROPS OPHTHALMIC 2 TIMES DAILY
Status: DISCONTINUED | OUTPATIENT
Start: 2023-03-23 | End: 2023-03-29 | Stop reason: HOSPADM

## 2023-03-23 RX ORDER — PREDNISOLONE ACETATE 10 MG/ML
1 SUSPENSION/ DROPS OPHTHALMIC 2 TIMES DAILY
COMMUNITY

## 2023-03-23 RX ORDER — ACETAMINOPHEN 325 MG/1
650 TABLET ORAL EVERY 6 HOURS PRN
Status: DISCONTINUED | OUTPATIENT
Start: 2023-03-23 | End: 2023-03-27

## 2023-03-23 RX ORDER — INDOMETHACIN 25 MG/1
50 CAPSULE ORAL
Status: DISCONTINUED | OUTPATIENT
Start: 2023-03-23 | End: 2023-03-29 | Stop reason: HOSPADM

## 2023-03-23 RX ORDER — MORPHINE SULFATE 2 MG/ML
2 INJECTION, SOLUTION INTRAMUSCULAR; INTRAVENOUS EVERY 4 HOURS PRN
Status: DISCONTINUED | OUTPATIENT
Start: 2023-03-23 | End: 2023-03-23

## 2023-03-23 RX ORDER — MORPHINE SULFATE 4 MG/ML
4 INJECTION, SOLUTION INTRAMUSCULAR; INTRAVENOUS ONCE
Status: COMPLETED | OUTPATIENT
Start: 2023-03-23 | End: 2023-03-23

## 2023-03-23 RX ORDER — OXYCODONE HYDROCHLORIDE AND ACETAMINOPHEN 5; 325 MG/1; MG/1
1 TABLET ORAL EVERY 8 HOURS PRN
Status: DISCONTINUED | OUTPATIENT
Start: 2023-03-23 | End: 2023-03-23

## 2023-03-23 RX ORDER — ENOXAPARIN SODIUM 100 MG/ML
40 INJECTION SUBCUTANEOUS DAILY
Status: DISCONTINUED | OUTPATIENT
Start: 2023-03-23 | End: 2023-03-24

## 2023-03-23 RX ORDER — AMLODIPINE BESYLATE 5 MG/1
5 TABLET ORAL DAILY
Status: DISCONTINUED | OUTPATIENT
Start: 2023-03-23 | End: 2023-03-29 | Stop reason: HOSPADM

## 2023-03-23 RX ORDER — ONDANSETRON 4 MG/1
4 TABLET, ORALLY DISINTEGRATING ORAL EVERY 8 HOURS PRN
Status: DISCONTINUED | OUTPATIENT
Start: 2023-03-23 | End: 2023-03-29 | Stop reason: HOSPADM

## 2023-03-23 RX ORDER — POLYETHYLENE GLYCOL 3350 17 G/17G
17 POWDER, FOR SOLUTION ORAL DAILY PRN
Status: DISCONTINUED | OUTPATIENT
Start: 2023-03-23 | End: 2023-03-29 | Stop reason: HOSPADM

## 2023-03-23 RX ORDER — SODIUM CHLORIDE 9 MG/ML
INJECTION, SOLUTION INTRAVENOUS PRN
Status: DISCONTINUED | OUTPATIENT
Start: 2023-03-23 | End: 2023-03-29 | Stop reason: HOSPADM

## 2023-03-23 RX ORDER — 0.9 % SODIUM CHLORIDE 0.9 %
500 INTRAVENOUS SOLUTION INTRAVENOUS ONCE
Status: COMPLETED | OUTPATIENT
Start: 2023-03-23 | End: 2023-03-23

## 2023-03-23 RX ORDER — MORPHINE SULFATE 2 MG/ML
2 INJECTION, SOLUTION INTRAMUSCULAR; INTRAVENOUS
Status: DISCONTINUED | OUTPATIENT
Start: 2023-03-23 | End: 2023-03-24

## 2023-03-23 RX ORDER — MORPHINE SULFATE 4 MG/ML
4 INJECTION, SOLUTION INTRAMUSCULAR; INTRAVENOUS
Status: DISCONTINUED | OUTPATIENT
Start: 2023-03-23 | End: 2023-03-24

## 2023-03-23 RX ORDER — ACETAMINOPHEN 650 MG/1
650 SUPPOSITORY RECTAL EVERY 6 HOURS PRN
Status: DISCONTINUED | OUTPATIENT
Start: 2023-03-23 | End: 2023-03-27

## 2023-03-23 RX ORDER — OFLOXACIN 3 MG/ML
1 SOLUTION/ DROPS OPHTHALMIC 4 TIMES DAILY
Status: DISPENSED | OUTPATIENT
Start: 2023-03-23 | End: 2023-03-27

## 2023-03-23 RX ORDER — TIZANIDINE 4 MG/1
4 TABLET ORAL EVERY 6 HOURS PRN
Status: DISCONTINUED | OUTPATIENT
Start: 2023-03-23 | End: 2023-03-29 | Stop reason: HOSPADM

## 2023-03-23 RX ORDER — DOCUSATE SODIUM 100 MG/1
100 CAPSULE, LIQUID FILLED ORAL DAILY
Status: DISCONTINUED | OUTPATIENT
Start: 2023-03-23 | End: 2023-03-29 | Stop reason: HOSPADM

## 2023-03-23 RX ORDER — KETOROLAC TROMETHAMINE 5 MG/ML
1 SOLUTION OPHTHALMIC 2 TIMES DAILY
Status: DISCONTINUED | OUTPATIENT
Start: 2023-03-23 | End: 2023-03-29 | Stop reason: HOSPADM

## 2023-03-23 RX ORDER — KETOROLAC TROMETHAMINE 30 MG/ML
15 INJECTION, SOLUTION INTRAMUSCULAR; INTRAVENOUS ONCE
Status: COMPLETED | OUTPATIENT
Start: 2023-03-23 | End: 2023-03-23

## 2023-03-23 RX ORDER — SODIUM CHLORIDE 0.9 % (FLUSH) 0.9 %
5-40 SYRINGE (ML) INJECTION EVERY 12 HOURS SCHEDULED
Status: DISCONTINUED | OUTPATIENT
Start: 2023-03-23 | End: 2023-03-29 | Stop reason: HOSPADM

## 2023-03-23 RX ORDER — ONDANSETRON 2 MG/ML
4 INJECTION INTRAMUSCULAR; INTRAVENOUS EVERY 6 HOURS PRN
Status: DISCONTINUED | OUTPATIENT
Start: 2023-03-23 | End: 2023-03-29 | Stop reason: HOSPADM

## 2023-03-23 RX ORDER — OFLOXACIN 3 MG/ML
1 SOLUTION/ DROPS OPHTHALMIC 4 TIMES DAILY
COMMUNITY

## 2023-03-23 RX ORDER — SODIUM CHLORIDE 0.9 % (FLUSH) 0.9 %
5-40 SYRINGE (ML) INJECTION PRN
Status: DISCONTINUED | OUTPATIENT
Start: 2023-03-23 | End: 2023-03-29 | Stop reason: HOSPADM

## 2023-03-23 RX ORDER — PANTOPRAZOLE SODIUM 40 MG/1
40 TABLET, DELAYED RELEASE ORAL
Status: DISCONTINUED | OUTPATIENT
Start: 2023-03-23 | End: 2023-03-29 | Stop reason: HOSPADM

## 2023-03-23 RX ORDER — KETOROLAC TROMETHAMINE 5 MG/ML
1 SOLUTION OPHTHALMIC 2 TIMES DAILY
COMMUNITY

## 2023-03-23 RX ADMIN — PREDNISOLONE ACETATE 1 DROP: 10 SUSPENSION/ DROPS OPHTHALMIC at 16:12

## 2023-03-23 RX ADMIN — KETOROLAC TROMETHAMINE 1 DROP: 5 SOLUTION OPHTHALMIC at 16:11

## 2023-03-23 RX ADMIN — MORPHINE SULFATE 2 MG: 2 INJECTION, SOLUTION INTRAMUSCULAR; INTRAVENOUS at 03:55

## 2023-03-23 RX ADMIN — MORPHINE SULFATE 4 MG: 4 INJECTION, SOLUTION INTRAMUSCULAR; INTRAVENOUS at 11:13

## 2023-03-23 RX ADMIN — TIZANIDINE 4 MG: 4 TABLET ORAL at 07:37

## 2023-03-23 RX ADMIN — SODIUM CHLORIDE, PRESERVATIVE FREE 10 ML: 5 INJECTION INTRAVENOUS at 10:44

## 2023-03-23 RX ADMIN — MORPHINE SULFATE 4 MG: 4 INJECTION, SOLUTION INTRAMUSCULAR; INTRAVENOUS at 17:59

## 2023-03-23 RX ADMIN — PANTOPRAZOLE SODIUM 40 MG: 40 TABLET, DELAYED RELEASE ORAL at 05:29

## 2023-03-23 RX ADMIN — OFLOXACIN 1 DROP: 3 SOLUTION/ DROPS OPHTHALMIC at 20:11

## 2023-03-23 RX ADMIN — SODIUM CHLORIDE, PRESERVATIVE FREE 10 ML: 5 INJECTION INTRAVENOUS at 20:59

## 2023-03-23 RX ADMIN — MORPHINE SULFATE 4 MG: 4 INJECTION, SOLUTION INTRAMUSCULAR; INTRAVENOUS at 01:13

## 2023-03-23 RX ADMIN — OFLOXACIN 1 DROP: 3 SOLUTION/ DROPS OPHTHALMIC at 16:11

## 2023-03-23 RX ADMIN — KETOROLAC TROMETHAMINE 15 MG: 30 INJECTION, SOLUTION INTRAMUSCULAR; INTRAVENOUS at 19:21

## 2023-03-23 RX ADMIN — OXYCODONE AND ACETAMINOPHEN 1 TABLET: 5; 325 TABLET ORAL at 07:37

## 2023-03-23 RX ADMIN — OFLOXACIN 1 DROP: 3 SOLUTION/ DROPS OPHTHALMIC at 12:43

## 2023-03-23 RX ADMIN — SODIUM CHLORIDE 500 ML: 9 INJECTION, SOLUTION INTRAVENOUS at 10:06

## 2023-03-23 RX ADMIN — INDOMETHACIN 50 MG: 25 CAPSULE ORAL at 17:43

## 2023-03-23 ASSESSMENT — PAIN DESCRIPTION - DESCRIPTORS
DESCRIPTORS: ACHING
DESCRIPTORS: ACHING;CRAMPING
DESCRIPTORS: ACHING;THROBBING
DESCRIPTORS: ACHING

## 2023-03-23 ASSESSMENT — PAIN DESCRIPTION - ORIENTATION
ORIENTATION: LEFT
ORIENTATION: RIGHT

## 2023-03-23 ASSESSMENT — PAIN DESCRIPTION - LOCATION
LOCATION: LEG
LOCATION: ANKLE
LOCATION: ANKLE;LEG
LOCATION: ANKLE
LOCATION: LEG

## 2023-03-23 ASSESSMENT — PAIN DESCRIPTION - ONSET: ONSET: ON-GOING

## 2023-03-23 ASSESSMENT — PAIN SCALES - GENERAL
PAINLEVEL_OUTOF10: 7
PAINLEVEL_OUTOF10: 0
PAINLEVEL_OUTOF10: 10
PAINLEVEL_OUTOF10: 8
PAINLEVEL_OUTOF10: 5
PAINLEVEL_OUTOF10: 7
PAINLEVEL_OUTOF10: 9

## 2023-03-23 ASSESSMENT — PAIN DESCRIPTION - PAIN TYPE
TYPE: ACUTE PAIN
TYPE: ACUTE PAIN

## 2023-03-23 ASSESSMENT — PAIN - FUNCTIONAL ASSESSMENT
PAIN_FUNCTIONAL_ASSESSMENT: PREVENTS OR INTERFERES SOME ACTIVE ACTIVITIES AND ADLS
PAIN_FUNCTIONAL_ASSESSMENT: 0-10
PAIN_FUNCTIONAL_ASSESSMENT: PREVENTS OR INTERFERES SOME ACTIVE ACTIVITIES AND ADLS

## 2023-03-23 ASSESSMENT — PAIN DESCRIPTION - DIRECTION: RADIATING_TOWARDS: LEG

## 2023-03-23 ASSESSMENT — PAIN DESCRIPTION - FREQUENCY: FREQUENCY: CONTINUOUS

## 2023-03-23 NOTE — ED NOTES
ED TO INPATIENT SBAR HANDOFF    Patient Name: Tyrone Quevedo   :  1950  68 y.o. MRN:  [id number]  Preferred Name  Huma Sampson   ED Room #:  ED19/ED-19  Family/Caregiver Present no   Restraints no   Sitter no   Sepsis Risk Score Sepsis Risk Score: 1.53    Situation  Code Status: Prior No additional code details. Allergies: Patient has no known allergies. Weight:   Patient Vitals for the past 96 hrs (Last 3 readings):   Weight   23 130 lb (59 kg)     Arrived from: home  Chief Complaint:   Chief Complaint   Patient presents with    Fall    Leg Injury     Imaging:   XR ANKLE LEFT (MIN 3 VIEWS)   Final Result   Acute mildly displaced medial malleolar and acute nondisplaced lateral   malleolar fractures. Abnormal labs:   Abnormal Labs Reviewed   CBC WITH AUTO DIFFERENTIAL - Abnormal; Notable for the following components:       Result Value    WBC 3.9 (*)     RBC 3.88 (*)     Hemoglobin 12.2 (*)     Hematocrit 36.7 (*)     MCH 31.4 (*)     Monocytes % 10.1 (*)     All other components within normal limits   COMPREHENSIVE METABOLIC PANEL - Abnormal; Notable for the following components:    Sodium 132 (*)     Chloride 94 (*)     BUN 25 (*)     Glucose 102 (*)     All other components within normal limits     Critical values: no     Abnormal Assessment Findings: Fracture of left foot, Patient was transitioning from her stair chair to the floor and when she went to step she fell. Denies any trauma. Patient is alert and in pain. EMS gave 50mcg Fentanyl in route. Patient states her other foot also is having some pain.      Background  History:   Past Medical History:   Diagnosis Date    Abdominal aortic aneurysm (AAA) >39 mm diameter     Hypertension     IBS (irritable bowel syndrome)        Assessment    Vitals/MEWS: MEWS Score: 1  Level of Consciousness: Alert (0)   Vitals:    23 0112   BP: (!) 185/83 (!) 187/116   Pulse: 90 90   Resp: 20 18   Temp: 97.6 °F (36.4 °C)

## 2023-03-23 NOTE — ED NOTES
Patient upset and emotional, asked patient about anxiety states \" I sure wish I could take something\" \" im so scared, life is so hard right now\" will perfect serve provider         Natalio Hickey RN  03/23/23 3271

## 2023-03-23 NOTE — ED PROVIDER NOTES
Emergency [address] EMERGENCY DEPARTMENT    Patient: Nieves Clarke  MRN: [id number]  : 1950  Date of Evaluation: 3/22/2023  ED Provider: Casie Corrigan PA-C    Chief Complaint       Chief Complaint   Patient presents with    Fall    Leg Injury       Michel Dietz is a 68 y.o. female who presents to the emergency department for left ankle pain. Onset was prior to arrival.  Patient has a history of inclusion body myositis. She states she had taken her chair lift downstairs to where her walker was waiting for her. She was going to walk a few steps in to the kitchen and her legs just gave out on her, causing her to fall. This is common with her disorder. She states her  helped her to her recliner and she didn't realize anything was wrong until she looked at her ankle and noticed it was swollen. Pain to the left ankle, primarily lateral.  Constant, aching. She denies hitting her head or LOC. She is not on blood thinners. ROS     HEAD:  Denies head injury. NECK:  Denies neck pain. RESPIRATORY:  Denies any shortness of breath. CARDIOVASCULAR:  Denies chest pain. GI:  Denies nausea or vomiting. :  Denies urinary symptoms. MUSCULOSKELETAL:  + left ankle pain. BACK:  Denies back pain. INTEGUMENT:  Denies skin changes. NEUROLOGIC:  Denies LOC.     Past History     Past Medical History:   Diagnosis Date    Abdominal aortic aneurysm (AAA) >39 mm diameter     Hypertension     IBS (irritable bowel syndrome)      Past Surgical History:   Procedure Laterality Date    TONSILLECTOMY       Social History     Socioeconomic History    Marital status:    Tobacco Use    Smoking status: Former    Smokeless tobacco: Never   Substance and Sexual Activity    Alcohol use: Yes     Comment: occ    Drug use: No       Medications/Allergies     Previous Medications    AMLODIPINE (NORVASC) 5 MG TABLET    Take 1 tablet by

## 2023-03-23 NOTE — CONSULTS
deferred   -Brisk cap refill at the toes, sensation is intact    Contralateral LE:   -No tenderness over hip, knee, ankle   -No pain with gentle PROM hip/knee/ankle   -Patient has an equinus deformity with foot drop and inability to dorsiflex the ankle or toes. -skin and sensation intact throughout      Bilateral UE's:   -No tenderness over bilateral wrist, elbow or shoulders.   -Full painless AROM and strength throughout   -skin and sensation intact throughout      Neurologic: Mental status: Alert, oriented, thought content appropriate    Labs     CBC:   Recent Labs     03/22/23  2242   WBC 3.9*   HGB 12.2*        BMP:    Recent Labs     03/22/23 2242   *   K 4.0   CL 94*   CO2 28   BUN 25*   CREATININE 0.7   GLUCOSE 102*     INR:  No results found for: INR, PROTIME     X-ray view   Imaging Review  X-ray images of the ankle reviewed by myself and discussed with the patient:    XR ANKLE LEFT (MIN 3 VIEWS)    Result Date: 3/22/2023  EXAMINATION: THREE XRAY VIEWS OF THE LEFT ANKLE 3/22/2023 9:21 pm COMPARISON: Left foot radiographs 02/27/2023. Left ankle radiographs 01/20/2023. HISTORY: ORDERING SYSTEM PROVIDED HISTORY: fall TECHNOLOGIST PROVIDED HISTORY: Reason for exam:->fall Reason for Exam: left ankle pain Additional signs and symptoms: fall Relevant Medical/Surgical History: na FINDINGS: There is an acute mildly displaced transverse fracture of the medial malleolus. An acute nondisplaced oblique fracture of the lateral malleolus is also seen. The ankle mortise is intact. The tibial plafond and talar dome are normal in appearance. The subtalar joint is grossly intact. Circumferential soft tissue swelling. Acute mildly displaced medial malleolar and acute nondisplaced lateral malleolar fractures. Assessment and Plan     1.   Left displaced bimalleolar   ankle fracture    The patient has been admitted to the hospitalist service for pre-operative and post-operative medical
10:37 AM       Please note this report has been partially produced using speech recognition software and may contain errors related to that system including errors in grammar, punctuation, and spelling, as well as words and phrases that may be inappropriate. If there are any questions or concerns please feel free to contact the dictating provider for clarification.

## 2023-03-23 NOTE — ED TRIAGE NOTES
Patient was transitioning from her stair chair to the floor and when she went to step she fell. Denies any trauma. Patient is alert and in pain. EMS gave 50mcg Fentanyl in route. Patient states her other foot also is having some pain.

## 2023-03-23 NOTE — H&P
ANKLE 3/22/2023 9:21 pm COMPARISON: Left foot radiographs 02/27/2023. Left ankle radiographs 01/20/2023. HISTORY: ORDERING SYSTEM PROVIDED HISTORY: fall TECHNOLOGIST PROVIDED HISTORY: Reason for exam:->fall Reason for Exam: left ankle pain Additional signs and symptoms: fall Relevant Medical/Surgical History: na FINDINGS: There is an acute mildly displaced transverse fracture of the medial malleolus. An acute nondisplaced oblique fracture of the lateral malleolus is also seen. The ankle mortise is intact. The tibial plafond and talar dome are normal in appearance. The subtalar joint is grossly intact. Circumferential soft tissue swelling. Acute mildly displaced medial malleolar and acute nondisplaced lateral malleolar fractures.        Personally reviewed Lab Studies, Imaging, and discussed case with     Electronically signed by Sri Forbes MD on 3/23/2023 at 1:01 AM

## 2023-03-23 NOTE — ED NOTES
Pt states that she isnt able to use purewick for urination, offered bedpan pt states \" not at this time\"/        Brock Velez RN  03/23/23 4040

## 2023-03-23 NOTE — PLAN OF CARE
Problem: Discharge Planning  Goal: Discharge to home or other facility with appropriate resources  Outcome: Progressing  Flowsheets (Taken 3/23/2023 0444 by Tushar Holbrook RN)  Discharge to home or other facility with appropriate resources: Identify barriers to discharge with patient and caregiver     Problem: Pain  Goal: Verbalizes/displays adequate comfort level or baseline comfort level  Outcome: Not Progressing     Problem: Skin/Tissue Integrity  Goal: Absence of new skin breakdown  Description: 1. Monitor for areas of redness and/or skin breakdown  2. Assess vascular access sites hourly  3. Every 4-6 hours minimum:  Change oxygen saturation probe site  4. Every 4-6 hours:  If on nasal continuous positive airway pressure, respiratory therapy assess nares and determine need for appliance change or resting period.   Outcome: Progressing     Problem: ABCDS Injury Assessment  Goal: Absence of physical injury  Outcome: Progressing     Problem: Safety - Adult  Goal: Free from fall injury  3/23/2023 1812 by Sindi Escobedo LPN  Outcome: Progressing  3/23/2023 0603 by Tushar Holbrook RN  Outcome: Progressing     Problem: Pain  Goal: Verbalizes/displays adequate comfort level or baseline comfort level  Outcome: Not Progressing

## 2023-03-24 ENCOUNTER — ANESTHESIA (OUTPATIENT)
Dept: OPERATING ROOM | Age: 73
DRG: 493 | End: 2023-03-24
Payer: MEDICARE

## 2023-03-24 ENCOUNTER — APPOINTMENT (OUTPATIENT)
Dept: GENERAL RADIOLOGY | Age: 73
DRG: 493 | End: 2023-03-24
Payer: MEDICARE

## 2023-03-24 ENCOUNTER — ANESTHESIA EVENT (OUTPATIENT)
Dept: OPERATING ROOM | Age: 73
DRG: 493 | End: 2023-03-24
Payer: MEDICARE

## 2023-03-24 LAB
ALBUMIN SERPL-MCNC: 3.6 GM/DL (ref 3.4–5)
ALP BLD-CCNC: 73 IU/L (ref 40–128)
ALT SERPL-CCNC: 18 U/L (ref 10–40)
ANION GAP SERPL CALCULATED.3IONS-SCNC: 7 MMOL/L (ref 4–16)
AST SERPL-CCNC: 20 IU/L (ref 15–37)
BASOPHILS ABSOLUTE: 0 K/CU MM
BASOPHILS RELATIVE PERCENT: 0.5 % (ref 0–1)
BILIRUB SERPL-MCNC: 0.5 MG/DL (ref 0–1)
BUN SERPL-MCNC: 30 MG/DL (ref 6–23)
CALCIUM SERPL-MCNC: 8.9 MG/DL (ref 8.3–10.6)
CHLORIDE BLD-SCNC: 103 MMOL/L (ref 99–110)
CO2: 29 MMOL/L (ref 21–32)
CREAT SERPL-MCNC: 1.1 MG/DL (ref 0.6–1.1)
DIFFERENTIAL TYPE: ABNORMAL
EOSINOPHILS ABSOLUTE: 0.1 K/CU MM
EOSINOPHILS RELATIVE PERCENT: 2.2 % (ref 0–3)
GFR SERPL CREATININE-BSD FRML MDRD: 53 ML/MIN/1.73M2
GLUCOSE SERPL-MCNC: 113 MG/DL (ref 70–99)
HCT VFR BLD CALC: 33.2 % (ref 37–47)
HEMOGLOBIN: 10.9 GM/DL (ref 12.5–16)
IMMATURE NEUTROPHIL %: 0.2 % (ref 0–0.43)
LYMPHOCYTES ABSOLUTE: 1.3 K/CU MM
LYMPHOCYTES RELATIVE PERCENT: 31.1 % (ref 24–44)
MCH RBC QN AUTO: 30.7 PG (ref 27–31)
MCHC RBC AUTO-ENTMCNC: 32.8 % (ref 32–36)
MCV RBC AUTO: 93.5 FL (ref 78–100)
MONOCYTES ABSOLUTE: 0.6 K/CU MM
MONOCYTES RELATIVE PERCENT: 13.3 % (ref 0–4)
NUCLEATED RBC %: 0 %
PDW BLD-RTO: 13.6 % (ref 11.7–14.9)
PLATELET # BLD: 156 K/CU MM (ref 140–440)
PMV BLD AUTO: 9.1 FL (ref 7.5–11.1)
POTASSIUM SERPL-SCNC: 4 MMOL/L (ref 3.5–5.1)
PRO-BNP: 201 PG/ML
RBC # BLD: 3.55 M/CU MM (ref 4.2–5.4)
SEGMENTED NEUTROPHILS ABSOLUTE COUNT: 2.2 K/CU MM
SEGMENTED NEUTROPHILS RELATIVE PERCENT: 52.7 % (ref 36–66)
SODIUM BLD-SCNC: 139 MMOL/L (ref 135–145)
TOTAL CK: 102 IU/L (ref 26–140)
TOTAL IMMATURE NEUTOROPHIL: 0.01 K/CU MM
TOTAL NUCLEATED RBC: 0 K/CU MM
TOTAL PROTEIN: 5.8 GM/DL (ref 6.4–8.2)
TROPONIN T: 0.41 NG/ML
WBC # BLD: 4.1 K/CU MM (ref 4–10.5)

## 2023-03-24 PROCEDURE — 7100000001 HC PACU RECOVERY - ADDTL 15 MIN: Performed by: ORTHOPAEDIC SURGERY

## 2023-03-24 PROCEDURE — 2580000003 HC RX 258: Performed by: ORTHOPAEDIC SURGERY

## 2023-03-24 PROCEDURE — 6360000002 HC RX W HCPCS: Performed by: ORTHOPAEDIC SURGERY

## 2023-03-24 PROCEDURE — 83880 ASSAY OF NATRIURETIC PEPTIDE: CPT

## 2023-03-24 PROCEDURE — 80053 COMPREHEN METABOLIC PANEL: CPT

## 2023-03-24 PROCEDURE — 1200000000 HC SEMI PRIVATE

## 2023-03-24 PROCEDURE — 6370000000 HC RX 637 (ALT 250 FOR IP): Performed by: ORTHOPAEDIC SURGERY

## 2023-03-24 PROCEDURE — 6360000002 HC RX W HCPCS: Performed by: HOSPITALIST

## 2023-03-24 PROCEDURE — 3700000000 HC ANESTHESIA ATTENDED CARE: Performed by: ORTHOPAEDIC SURGERY

## 2023-03-24 PROCEDURE — 2720000010 HC SURG SUPPLY STERILE: Performed by: ORTHOPAEDIC SURGERY

## 2023-03-24 PROCEDURE — 6370000000 HC RX 637 (ALT 250 FOR IP): Performed by: INTERNAL MEDICINE

## 2023-03-24 PROCEDURE — 2709999900 HC NON-CHARGEABLE SUPPLY: Performed by: ORTHOPAEDIC SURGERY

## 2023-03-24 PROCEDURE — 71045 X-RAY EXAM CHEST 1 VIEW: CPT

## 2023-03-24 PROCEDURE — 2580000003 HC RX 258: Performed by: INTERNAL MEDICINE

## 2023-03-24 PROCEDURE — 3700000001 HC ADD 15 MINUTES (ANESTHESIA): Performed by: ORTHOPAEDIC SURGERY

## 2023-03-24 PROCEDURE — 2500000003 HC RX 250 WO HCPCS: Performed by: NURSE ANESTHETIST, CERTIFIED REGISTERED

## 2023-03-24 PROCEDURE — 85025 COMPLETE CBC W/AUTO DIFF WBC: CPT

## 2023-03-24 PROCEDURE — 36415 COLL VENOUS BLD VENIPUNCTURE: CPT

## 2023-03-24 PROCEDURE — 3600000004 HC SURGERY LEVEL 4 BASE: Performed by: ORTHOPAEDIC SURGERY

## 2023-03-24 PROCEDURE — 99232 SBSQ HOSP IP/OBS MODERATE 35: CPT | Performed by: INTERNAL MEDICINE

## 2023-03-24 PROCEDURE — 76000 FLUOROSCOPY <1 HR PHYS/QHP: CPT

## 2023-03-24 PROCEDURE — 27814 TREATMENT OF ANKLE FRACTURE: CPT | Performed by: ORTHOPAEDIC SURGERY

## 2023-03-24 PROCEDURE — 7100000000 HC PACU RECOVERY - FIRST 15 MIN: Performed by: ORTHOPAEDIC SURGERY

## 2023-03-24 PROCEDURE — 0QSK04Z REPOSITION LEFT FIBULA WITH INTERNAL FIXATION DEVICE, OPEN APPROACH: ICD-10-PCS | Performed by: ORTHOPAEDIC SURGERY

## 2023-03-24 PROCEDURE — 82550 ASSAY OF CK (CPK): CPT

## 2023-03-24 PROCEDURE — 6360000002 HC RX W HCPCS: Performed by: NURSE ANESTHETIST, CERTIFIED REGISTERED

## 2023-03-24 PROCEDURE — 3600000014 HC SURGERY LEVEL 4 ADDTL 15MIN: Performed by: ORTHOPAEDIC SURGERY

## 2023-03-24 PROCEDURE — C1713 ANCHOR/SCREW BN/BN,TIS/BN: HCPCS | Performed by: ORTHOPAEDIC SURGERY

## 2023-03-24 PROCEDURE — 0QSH04Z REPOSITION LEFT TIBIA WITH INTERNAL FIXATION DEVICE, OPEN APPROACH: ICD-10-PCS | Performed by: ORTHOPAEDIC SURGERY

## 2023-03-24 PROCEDURE — 84484 ASSAY OF TROPONIN QUANT: CPT

## 2023-03-24 DEVICE — SCREW CORTES 3.5MM SELF-TAPPING 18MM: Type: IMPLANTABLE DEVICE | Site: ANKLE | Status: FUNCTIONAL

## 2023-03-24 DEVICE — SCREW BNE L26MM DIA3.5MM CORT S STL ST NONCANNULATED LOK: Type: IMPLANTABLE DEVICE | Site: ANKLE | Status: FUNCTIONAL

## 2023-03-24 DEVICE — SCREW BNE L10MM DIA2.7MM CORT S STL ST LOK FULL THRD T8: Type: IMPLANTABLE DEVICE | Site: ANKLE | Status: FUNCTIONAL

## 2023-03-24 DEVICE — SCREW BNE L14MM DIA2.7MM CORT S STL ST LOK FULL THRD T8: Type: IMPLANTABLE DEVICE | Site: ANKLE | Status: FUNCTIONAL

## 2023-03-24 DEVICE — SCREW BNE L12MM DIA3.5MM CORT S STL ST LOK FULL THRD: Type: IMPLANTABLE DEVICE | Site: ANKLE | Status: FUNCTIONAL

## 2023-03-24 DEVICE — SCREW BNE L12MM DIA2.7MM CORT S STL ST LOK FULL THRD T8: Type: IMPLANTABLE DEVICE | Site: ANKLE | Status: FUNCTIONAL

## 2023-03-24 DEVICE — PLATE BNE L86MM 4 H ST L LAT DST FIBULAR S STL LOK COMPR: Type: IMPLANTABLE DEVICE | Site: ANKLE | Status: FUNCTIONAL

## 2023-03-24 DEVICE — SCREW BNE L16MM DIA2.7MM CORT S STL ST LOK FULL THRD T8: Type: IMPLANTABLE DEVICE | Site: ANKLE | Status: FUNCTIONAL

## 2023-03-24 DEVICE — SCREW BNE L14MM DIA3.5MM CORT S STL ST LOK FULL THRD: Type: IMPLANTABLE DEVICE | Site: ANKLE | Status: FUNCTIONAL

## 2023-03-24 DEVICE — SCREW BNE L14MM DIA3.5MM CORT S STL ST NONCANNULATED LOK: Type: IMPLANTABLE DEVICE | Site: ANKLE | Status: FUNCTIONAL

## 2023-03-24 DEVICE — SCREW BNE L40MM DIA4MM S STL CANN SHT 1/3 THRD SM HEX SOCK: Type: IMPLANTABLE DEVICE | Site: ANKLE | Status: FUNCTIONAL

## 2023-03-24 RX ORDER — ENOXAPARIN SODIUM 100 MG/ML
40 INJECTION SUBCUTANEOUS DAILY
Status: DISCONTINUED | OUTPATIENT
Start: 2023-03-25 | End: 2023-03-29 | Stop reason: HOSPADM

## 2023-03-24 RX ORDER — PROPOFOL 10 MG/ML
INJECTION, EMULSION INTRAVENOUS PRN
Status: DISCONTINUED | OUTPATIENT
Start: 2023-03-24 | End: 2023-03-24 | Stop reason: SDUPTHER

## 2023-03-24 RX ORDER — ACETAMINOPHEN 325 MG/1
650 TABLET ORAL EVERY 6 HOURS
Status: DISCONTINUED | OUTPATIENT
Start: 2023-03-24 | End: 2023-03-27

## 2023-03-24 RX ORDER — FENTANYL CITRATE 50 UG/ML
INJECTION, SOLUTION INTRAMUSCULAR; INTRAVENOUS PRN
Status: DISCONTINUED | OUTPATIENT
Start: 2023-03-24 | End: 2023-03-24 | Stop reason: SDUPTHER

## 2023-03-24 RX ORDER — MEPERIDINE HYDROCHLORIDE 25 MG/ML
12.5 INJECTION INTRAMUSCULAR; INTRAVENOUS; SUBCUTANEOUS EVERY 5 MIN PRN
Status: DISCONTINUED | OUTPATIENT
Start: 2023-03-24 | End: 2023-03-24 | Stop reason: HOSPADM

## 2023-03-24 RX ORDER — ONDANSETRON 2 MG/ML
4 INJECTION INTRAMUSCULAR; INTRAVENOUS
Status: DISCONTINUED | OUTPATIENT
Start: 2023-03-24 | End: 2023-03-24 | Stop reason: HOSPADM

## 2023-03-24 RX ORDER — DEXAMETHASONE SODIUM PHOSPHATE 4 MG/ML
INJECTION, SOLUTION INTRA-ARTICULAR; INTRALESIONAL; INTRAMUSCULAR; INTRAVENOUS; SOFT TISSUE PRN
Status: DISCONTINUED | OUTPATIENT
Start: 2023-03-24 | End: 2023-03-24 | Stop reason: SDUPTHER

## 2023-03-24 RX ORDER — MAGNESIUM HYDROXIDE/ALUMINUM HYDROXICE/SIMETHICONE 120; 1200; 1200 MG/30ML; MG/30ML; MG/30ML
15 SUSPENSION ORAL EVERY 6 HOURS PRN
Status: DISCONTINUED | OUTPATIENT
Start: 2023-03-24 | End: 2023-03-29 | Stop reason: HOSPADM

## 2023-03-24 RX ORDER — OXYCODONE HYDROCHLORIDE 5 MG/1
5 TABLET ORAL
Status: DISCONTINUED | OUTPATIENT
Start: 2023-03-24 | End: 2023-03-24 | Stop reason: HOSPADM

## 2023-03-24 RX ORDER — HYDROMORPHONE HYDROCHLORIDE 1 MG/ML
0.5 INJECTION, SOLUTION INTRAMUSCULAR; INTRAVENOUS; SUBCUTANEOUS
Status: DISCONTINUED | OUTPATIENT
Start: 2023-03-24 | End: 2023-03-27

## 2023-03-24 RX ORDER — SODIUM CHLORIDE 9 MG/ML
INJECTION, SOLUTION INTRAVENOUS PRN
Status: DISCONTINUED | OUTPATIENT
Start: 2023-03-24 | End: 2023-03-29 | Stop reason: HOSPADM

## 2023-03-24 RX ORDER — IPRATROPIUM BROMIDE AND ALBUTEROL SULFATE 2.5; .5 MG/3ML; MG/3ML
1 SOLUTION RESPIRATORY (INHALATION)
Status: DISCONTINUED | OUTPATIENT
Start: 2023-03-24 | End: 2023-03-24 | Stop reason: HOSPADM

## 2023-03-24 RX ORDER — SODIUM CHLORIDE 0.9 % (FLUSH) 0.9 %
5-40 SYRINGE (ML) INJECTION PRN
Status: DISCONTINUED | OUTPATIENT
Start: 2023-03-24 | End: 2023-03-29 | Stop reason: HOSPADM

## 2023-03-24 RX ORDER — SODIUM CHLORIDE, SODIUM LACTATE, POTASSIUM CHLORIDE, CALCIUM CHLORIDE 600; 310; 30; 20 MG/100ML; MG/100ML; MG/100ML; MG/100ML
INJECTION, SOLUTION INTRAVENOUS CONTINUOUS
Status: DISCONTINUED | OUTPATIENT
Start: 2023-03-24 | End: 2023-03-27

## 2023-03-24 RX ORDER — SODIUM CHLORIDE 0.9 % (FLUSH) 0.9 %
5-40 SYRINGE (ML) INJECTION EVERY 12 HOURS SCHEDULED
Status: DISCONTINUED | OUTPATIENT
Start: 2023-03-24 | End: 2023-03-29 | Stop reason: HOSPADM

## 2023-03-24 RX ORDER — DROPERIDOL 2.5 MG/ML
0.62 INJECTION, SOLUTION INTRAMUSCULAR; INTRAVENOUS
Status: DISCONTINUED | OUTPATIENT
Start: 2023-03-24 | End: 2023-03-24 | Stop reason: HOSPADM

## 2023-03-24 RX ORDER — ONDANSETRON 2 MG/ML
INJECTION INTRAMUSCULAR; INTRAVENOUS PRN
Status: DISCONTINUED | OUTPATIENT
Start: 2023-03-24 | End: 2023-03-24 | Stop reason: SDUPTHER

## 2023-03-24 RX ORDER — LIDOCAINE HYDROCHLORIDE 20 MG/ML
INJECTION, SOLUTION EPIDURAL; INFILTRATION; INTRACAUDAL; PERINEURAL PRN
Status: DISCONTINUED | OUTPATIENT
Start: 2023-03-24 | End: 2023-03-24 | Stop reason: SDUPTHER

## 2023-03-24 RX ORDER — HYDROMORPHONE HYDROCHLORIDE 1 MG/ML
0.25 INJECTION, SOLUTION INTRAMUSCULAR; INTRAVENOUS; SUBCUTANEOUS
Status: DISCONTINUED | OUTPATIENT
Start: 2023-03-24 | End: 2023-03-27

## 2023-03-24 RX ORDER — HYDRALAZINE HYDROCHLORIDE 20 MG/ML
10 INJECTION INTRAMUSCULAR; INTRAVENOUS
Status: DISCONTINUED | OUTPATIENT
Start: 2023-03-24 | End: 2023-03-24 | Stop reason: HOSPADM

## 2023-03-24 RX ORDER — SODIUM CHLORIDE, SODIUM LACTATE, POTASSIUM CHLORIDE, CALCIUM CHLORIDE 600; 310; 30; 20 MG/100ML; MG/100ML; MG/100ML; MG/100ML
INJECTION, SOLUTION INTRAVENOUS CONTINUOUS PRN
Status: DISCONTINUED | OUTPATIENT
Start: 2023-03-24 | End: 2023-03-24 | Stop reason: SDUPTHER

## 2023-03-24 RX ORDER — LABETALOL HYDROCHLORIDE 5 MG/ML
10 INJECTION, SOLUTION INTRAVENOUS
Status: DISCONTINUED | OUTPATIENT
Start: 2023-03-24 | End: 2023-03-24 | Stop reason: HOSPADM

## 2023-03-24 RX ORDER — HYDROCODONE BITARTRATE AND ACETAMINOPHEN 7.5; 325 MG/1; MG/1
1 TABLET ORAL EVERY 4 HOURS PRN
Status: DISCONTINUED | OUTPATIENT
Start: 2023-03-24 | End: 2023-03-29 | Stop reason: HOSPADM

## 2023-03-24 RX ORDER — PHENYLEPHRINE HCL IN 0.9% NACL 1 MG/10 ML
SYRINGE (ML) INTRAVENOUS PRN
Status: DISCONTINUED | OUTPATIENT
Start: 2023-03-24 | End: 2023-03-24 | Stop reason: SDUPTHER

## 2023-03-24 RX ORDER — FENTANYL CITRATE 50 UG/ML
50 INJECTION, SOLUTION INTRAMUSCULAR; INTRAVENOUS EVERY 5 MIN PRN
Status: DISCONTINUED | OUTPATIENT
Start: 2023-03-24 | End: 2023-03-24 | Stop reason: HOSPADM

## 2023-03-24 RX ADMIN — LIDOCAINE HYDROCHLORIDE 80 MG: 20 INJECTION, SOLUTION EPIDURAL; INFILTRATION; INTRACAUDAL; PERINEURAL at 12:59

## 2023-03-24 RX ADMIN — Medication 50 MG: at 13:35

## 2023-03-24 RX ADMIN — ACETAMINOPHEN 650 MG: 325 TABLET ORAL at 16:19

## 2023-03-24 RX ADMIN — INDOMETHACIN 50 MG: 25 CAPSULE ORAL at 16:19

## 2023-03-24 RX ADMIN — PREDNISOLONE ACETATE 1 DROP: 10 SUSPENSION/ DROPS OPHTHALMIC at 15:55

## 2023-03-24 RX ADMIN — CEFAZOLIN 2000 MG: 2 INJECTION, POWDER, FOR SOLUTION INTRAMUSCULAR; INTRAVENOUS at 13:02

## 2023-03-24 RX ADMIN — PROPOFOL 140 MG: 10 INJECTION, EMULSION INTRAVENOUS at 12:59

## 2023-03-24 RX ADMIN — PREDNISOLONE ACETATE 1 DROP: 10 SUSPENSION/ DROPS OPHTHALMIC at 09:33

## 2023-03-24 RX ADMIN — FENTANYL CITRATE 50 MCG: 50 INJECTION, SOLUTION INTRAMUSCULAR; INTRAVENOUS at 14:01

## 2023-03-24 RX ADMIN — DEXAMETHASONE SODIUM PHOSPHATE 4 MG: 4 INJECTION, SOLUTION INTRAMUSCULAR; INTRAVENOUS at 12:59

## 2023-03-24 RX ADMIN — OFLOXACIN 1 DROP: 3 SOLUTION/ DROPS OPHTHALMIC at 09:34

## 2023-03-24 RX ADMIN — SODIUM CHLORIDE, POTASSIUM CHLORIDE, SODIUM LACTATE AND CALCIUM CHLORIDE: 600; 310; 30; 20 INJECTION, SOLUTION INTRAVENOUS at 15:22

## 2023-03-24 RX ADMIN — SODIUM CHLORIDE: 9 INJECTION, SOLUTION INTRAVENOUS at 12:08

## 2023-03-24 RX ADMIN — PANTOPRAZOLE SODIUM 40 MG: 40 TABLET, DELAYED RELEASE ORAL at 05:16

## 2023-03-24 RX ADMIN — Medication 50 MG: at 13:07

## 2023-03-24 RX ADMIN — FENTANYL CITRATE 25 MCG: 50 INJECTION, SOLUTION INTRAMUSCULAR; INTRAVENOUS at 14:18

## 2023-03-24 RX ADMIN — ONDANSETRON 4 MG: 2 INJECTION INTRAMUSCULAR; INTRAVENOUS at 12:59

## 2023-03-24 RX ADMIN — CEFAZOLIN 2000 MG: 2 INJECTION, POWDER, FOR SOLUTION INTRAMUSCULAR; INTRAVENOUS at 16:19

## 2023-03-24 RX ADMIN — FENTANYL CITRATE 50 MCG: 50 INJECTION, SOLUTION INTRAMUSCULAR; INTRAVENOUS at 12:59

## 2023-03-24 RX ADMIN — SODIUM CHLORIDE, SODIUM LACTATE, POTASSIUM CHLORIDE, CALCIUM CHLORIDE: 600; 310; 30; 20 INJECTION, SOLUTION INTRAVENOUS at 12:53

## 2023-03-24 RX ADMIN — Medication 50 MG: at 13:16

## 2023-03-24 RX ADMIN — SODIUM CHLORIDE, PRESERVATIVE FREE 10 ML: 5 INJECTION INTRAVENOUS at 09:34

## 2023-03-24 RX ADMIN — OFLOXACIN 1 DROP: 3 SOLUTION/ DROPS OPHTHALMIC at 22:12

## 2023-03-24 RX ADMIN — FENTANYL CITRATE 50 MCG: 50 INJECTION, SOLUTION INTRAMUSCULAR; INTRAVENOUS at 12:51

## 2023-03-24 RX ADMIN — TIZANIDINE 4 MG: 4 TABLET ORAL at 09:26

## 2023-03-24 RX ADMIN — OFLOXACIN 1 DROP: 3 SOLUTION/ DROPS OPHTHALMIC at 15:55

## 2023-03-24 RX ADMIN — KETOROLAC TROMETHAMINE 1 DROP: 5 SOLUTION OPHTHALMIC at 15:55

## 2023-03-24 RX ADMIN — FENTANYL CITRATE 25 MCG: 50 INJECTION, SOLUTION INTRAMUSCULAR; INTRAVENOUS at 13:43

## 2023-03-24 RX ADMIN — AMLODIPINE BESYLATE 5 MG: 5 TABLET ORAL at 09:26

## 2023-03-24 RX ADMIN — MORPHINE SULFATE 4 MG: 4 INJECTION, SOLUTION INTRAMUSCULAR; INTRAVENOUS at 11:16

## 2023-03-24 RX ADMIN — KETOROLAC TROMETHAMINE 1 DROP: 5 SOLUTION OPHTHALMIC at 09:20

## 2023-03-24 ASSESSMENT — PAIN - FUNCTIONAL ASSESSMENT
PAIN_FUNCTIONAL_ASSESSMENT: 0-10
PAIN_FUNCTIONAL_ASSESSMENT: PREVENTS OR INTERFERES SOME ACTIVE ACTIVITIES AND ADLS

## 2023-03-24 ASSESSMENT — PAIN DESCRIPTION - LOCATION
LOCATION: FOOT;ANKLE
LOCATION: ANKLE
LOCATION: ANKLE

## 2023-03-24 ASSESSMENT — PAIN SCALES - GENERAL
PAINLEVEL_OUTOF10: 6
PAINLEVEL_OUTOF10: 10
PAINLEVEL_OUTOF10: 2

## 2023-03-24 ASSESSMENT — PAIN DESCRIPTION - DESCRIPTORS
DESCRIPTORS: DULL
DESCRIPTORS: ACHING
DESCRIPTORS: SHARP
DESCRIPTORS: SHOOTING;STABBING;THROBBING

## 2023-03-24 ASSESSMENT — PAIN DESCRIPTION - ORIENTATION
ORIENTATION: LEFT

## 2023-03-24 NOTE — OP NOTE
dressings applied with Xeroform, 4 x 8's, ABD, sterile Webril, and a well-padded posterior splint with an Ace wrap. Patient was extubated taken to PACU in stable condition. All sponge and needle counts were correct. Postoperative plan:  Patient will be nonweightbearing in her splint and she will continue medical management by the hospitalist service. She will be nonweightbearing for 2 weeks and follow-up in the office for x-rays and suture removal and transition to a boot.     Electronically signed by Shane Bazan MD on 3/24/2023 at 2:46 PM

## 2023-03-24 NOTE — ANESTHESIA PRE PROCEDURE
Department of Anesthesiology  Preprocedure Note       Name:  Zhou Olivares   Age:  68 y.o.  :  1950                                          MRN:  [id number]         Date:  3/24/2023      Surgeon: Viktor Lundy):  Gerson Moses MD    Procedure: Procedure(s):  LEFT ANKLE OPEN REDUCTION INTERNAL FIXATION    Medications prior to admission:   Prior to Admission medications    Medication Sig Start Date End Date Taking?  Authorizing Provider   ofloxacin (OCUFLOX) 0.3 % solution 1 drop 4 times daily   Yes Historical Provider, MD   ketorolac (ACULAR) 0.5 % ophthalmic solution 1 drop 2 times daily   Yes Historical Provider, MD   prednisoLONE acetate (PRED FORTE) 1 % ophthalmic suspension 1 drop 2 times daily   Yes Historical Provider, MD   amLODIPine (NORVASC) 5 MG tablet Take 1 tablet by mouth daily  Patient not taking: Reported on 3/23/2023 2/1/23   Anastacia Jimenez MD   lidocaine 4 % external patch Place 1 patch onto the skin daily  Patient not taking: Reported on 3/23/2023 2/1/23   Anastacia Jimenez MD   docusate sodium (COLACE, DULCOLAX) 100 MG CAPS Take 100 mg by mouth daily  Patient not taking: Reported on 3/23/2023 2/1/23   Anastacia Jimenez MD   tiZANidine (ZANAFLEX) 4 MG tablet Take 1 tablet by mouth every 6 hours as needed (Muscle pains)  Patient not taking: Reported on 3/23/2023 1/31/23   Anastacia Jimenez MD   pantoprazole (PROTONIX) 40 MG tablet Take 1 tablet by mouth every morning (before breakfast)  Patient not taking: Reported on 3/23/2023 2/1/23   Anastacia Jimenez MD       Current medications:    Current Facility-Administered Medications   Medication Dose Route Frequency Provider Last Rate Last Admin    amLODIPine (NORVASC) tablet 5 mg  5 mg Oral Daily Homero Baxter MD        docusate sodium (COLACE) capsule 100 mg  100 mg Oral Daily Homero Baxter MD        pantoprazole (PROTONIX) tablet 40 mg  40 mg Oral QAM AC Homero Baxter MD   40 mg at 23 0516    tiZANidine (Angel Sabins)
aorta.      Signature      ------------------------------------------------------------------   Electronically signed by Samantha Mancini MD   (Interpreting physician) on 03/23/2023 at 12:08 PM   ------------------------------------------------------------------     Neuro/Psych:   (+) neuromuscular disease (inclusion body myositis):,             GI/Hepatic/Renal:             Endo/Other:    (+) : arthritis (LLE fx):., .                 Abdominal:             Vascular:           ROS comment: AAA>39mm. Other Findings:             Anesthesia Plan      general     ASA 3       Induction: intravenous. Anesthetic plan and risks discussed with patient. Use of blood products discussed with patient whom consented to blood products. Plan discussed with CRNA.     Attending anesthesiologist reviewed and agrees with Paula Goddard MD   3/24/2023

## 2023-03-24 NOTE — PLAN OF CARE
Problem: Discharge Planning  Goal: Discharge to home or other facility with appropriate resources  3/24/2023 0139 by Bhumika Fowler LPN  Outcome: Progressing  Flowsheets (Taken 3/23/2023 1928 by Lino Dick, RN)  Discharge to home or other facility with appropriate resources: Identify barriers to discharge with patient and caregiver  3/23/2023 1812 by Halie Kasper LPN  Outcome: Progressing  Flowsheets (Taken 3/23/2023 0444 by Jo Noble RN)  Discharge to home or other facility with appropriate resources: Identify barriers to discharge with patient and caregiver     Problem: Pain  Goal: Verbalizes/displays adequate comfort level or baseline comfort level  3/24/2023 0139 by Bhumika Fowler LPN  Outcome: Progressing  3/23/2023 1812 by Halie Kasper LPN  Outcome: Not Progressing     Problem: Skin/Tissue Integrity  Goal: Absence of new skin breakdown  Description: 1. Monitor for areas of redness and/or skin breakdown  2. Assess vascular access sites hourly  3. Every 4-6 hours minimum:  Change oxygen saturation probe site  4. Every 4-6 hours:  If on nasal continuous positive airway pressure, respiratory therapy assess nares and determine need for appliance change or resting period.   3/24/2023 0139 by Bhumika Fowler LPN  Outcome: Progressing  3/23/2023 1812 by Halie Kasper LPN  Outcome: Progressing     Problem: ABCDS Injury Assessment  Goal: Absence of physical injury  3/24/2023 0139 by Bhumika Fowler LPN  Outcome: Progressing  Flowsheets (Taken 3/23/2023 1921 by Lino Dick, RN)  Absence of Physical Injury: Implement safety measures based on patient assessment  3/23/2023 1812 by Halie Kasper LPN  Outcome: Progressing     Problem: Safety - Adult  Goal: Free from fall injury  3/24/2023 0139 by Bhumika Fowler LPN  Outcome: Progressing  Flowsheets (Taken 3/23/2023 1921 by Lino Dick, RN)  Free From Fall Injury:   Instruct family/caregiver on patient safety   Based on

## 2023-03-25 PROCEDURE — 6360000002 HC RX W HCPCS: Performed by: ORTHOPAEDIC SURGERY

## 2023-03-25 PROCEDURE — 6370000000 HC RX 637 (ALT 250 FOR IP): Performed by: ORTHOPAEDIC SURGERY

## 2023-03-25 PROCEDURE — 94761 N-INVAS EAR/PLS OXIMETRY MLT: CPT

## 2023-03-25 PROCEDURE — 2700000000 HC OXYGEN THERAPY PER DAY

## 2023-03-25 PROCEDURE — 2580000003 HC RX 258: Performed by: ORTHOPAEDIC SURGERY

## 2023-03-25 PROCEDURE — 97162 PT EVAL MOD COMPLEX 30 MIN: CPT

## 2023-03-25 PROCEDURE — 97530 THERAPEUTIC ACTIVITIES: CPT

## 2023-03-25 PROCEDURE — 6370000000 HC RX 637 (ALT 250 FOR IP): Performed by: STUDENT IN AN ORGANIZED HEALTH CARE EDUCATION/TRAINING PROGRAM

## 2023-03-25 PROCEDURE — 97166 OT EVAL MOD COMPLEX 45 MIN: CPT

## 2023-03-25 PROCEDURE — 97112 NEUROMUSCULAR REEDUCATION: CPT

## 2023-03-25 PROCEDURE — 1200000000 HC SEMI PRIVATE

## 2023-03-25 RX ORDER — LANOLIN ALCOHOL/MO/W.PET/CERES
3 CREAM (GRAM) TOPICAL NIGHTLY
Status: DISCONTINUED | OUTPATIENT
Start: 2023-03-25 | End: 2023-03-29 | Stop reason: HOSPADM

## 2023-03-25 RX ADMIN — OFLOXACIN 1 DROP: 3 SOLUTION/ DROPS OPHTHALMIC at 20:57

## 2023-03-25 RX ADMIN — INDOMETHACIN 50 MG: 25 CAPSULE ORAL at 09:25

## 2023-03-25 RX ADMIN — ACETAMINOPHEN 650 MG: 325 TABLET ORAL at 09:16

## 2023-03-25 RX ADMIN — KETOROLAC TROMETHAMINE 1 DROP: 5 SOLUTION OPHTHALMIC at 16:27

## 2023-03-25 RX ADMIN — CEFAZOLIN 2000 MG: 2 INJECTION, POWDER, FOR SOLUTION INTRAMUSCULAR; INTRAVENOUS at 01:49

## 2023-03-25 RX ADMIN — HYDROCODONE BITARTRATE AND ACETAMINOPHEN 1 TABLET: 7.5; 325 TABLET ORAL at 20:43

## 2023-03-25 RX ADMIN — OFLOXACIN 1 DROP: 3 SOLUTION/ DROPS OPHTHALMIC at 12:56

## 2023-03-25 RX ADMIN — INDOMETHACIN 50 MG: 25 CAPSULE ORAL at 12:56

## 2023-03-25 RX ADMIN — OFLOXACIN 1 DROP: 3 SOLUTION/ DROPS OPHTHALMIC at 09:22

## 2023-03-25 RX ADMIN — ENOXAPARIN SODIUM 40 MG: 100 INJECTION SUBCUTANEOUS at 09:16

## 2023-03-25 RX ADMIN — PREDNISOLONE ACETATE 1 DROP: 10 SUSPENSION/ DROPS OPHTHALMIC at 16:27

## 2023-03-25 RX ADMIN — ACETAMINOPHEN 650 MG: 325 TABLET ORAL at 16:24

## 2023-03-25 RX ADMIN — SODIUM CHLORIDE, POTASSIUM CHLORIDE, SODIUM LACTATE AND CALCIUM CHLORIDE: 600; 310; 30; 20 INJECTION, SOLUTION INTRAVENOUS at 20:56

## 2023-03-25 RX ADMIN — SODIUM CHLORIDE, PRESERVATIVE FREE 10 ML: 5 INJECTION INTRAVENOUS at 09:16

## 2023-03-25 RX ADMIN — INDOMETHACIN 50 MG: 25 CAPSULE ORAL at 16:24

## 2023-03-25 RX ADMIN — PANTOPRAZOLE SODIUM 40 MG: 40 TABLET, DELAYED RELEASE ORAL at 05:27

## 2023-03-25 RX ADMIN — Medication 3 MG: at 20:44

## 2023-03-25 RX ADMIN — PREDNISOLONE ACETATE 1 DROP: 10 SUSPENSION/ DROPS OPHTHALMIC at 09:22

## 2023-03-25 RX ADMIN — DOCUSATE SODIUM 100 MG: 100 CAPSULE, LIQUID FILLED ORAL at 09:16

## 2023-03-25 RX ADMIN — AMLODIPINE BESYLATE 5 MG: 5 TABLET ORAL at 09:16

## 2023-03-25 RX ADMIN — SODIUM CHLORIDE, PRESERVATIVE FREE 10 ML: 5 INJECTION INTRAVENOUS at 20:45

## 2023-03-25 RX ADMIN — SODIUM CHLORIDE, PRESERVATIVE FREE 10 ML: 5 INJECTION INTRAVENOUS at 09:23

## 2023-03-25 RX ADMIN — SODIUM CHLORIDE, POTASSIUM CHLORIDE, SODIUM LACTATE AND CALCIUM CHLORIDE: 600; 310; 30; 20 INJECTION, SOLUTION INTRAVENOUS at 05:40

## 2023-03-25 RX ADMIN — ACETAMINOPHEN 650 MG: 325 TABLET ORAL at 20:44

## 2023-03-25 RX ADMIN — KETOROLAC TROMETHAMINE 1 DROP: 5 SOLUTION OPHTHALMIC at 09:22

## 2023-03-25 RX ADMIN — OFLOXACIN 1 DROP: 3 SOLUTION/ DROPS OPHTHALMIC at 16:27

## 2023-03-25 ASSESSMENT — PAIN DESCRIPTION - DESCRIPTORS: DESCRIPTORS: DULL

## 2023-03-25 ASSESSMENT — PAIN DESCRIPTION - PAIN TYPE: TYPE: ACUTE PAIN

## 2023-03-25 ASSESSMENT — PAIN SCALES - GENERAL
PAINLEVEL_OUTOF10: 3
PAINLEVEL_OUTOF10: 3
PAINLEVEL_OUTOF10: 0

## 2023-03-25 ASSESSMENT — PAIN SCALES - WONG BAKER: WONGBAKER_NUMERICALRESPONSE: 2

## 2023-03-25 ASSESSMENT — PAIN DESCRIPTION - LOCATION
LOCATION: GENERALIZED
LOCATION: ANKLE
LOCATION: ANKLE

## 2023-03-25 ASSESSMENT — PAIN DESCRIPTION - ORIENTATION
ORIENTATION: LEFT
ORIENTATION: LEFT

## 2023-03-25 ASSESSMENT — PAIN DESCRIPTION - DIRECTION: RADIATING_TOWARDS: LEG

## 2023-03-25 ASSESSMENT — PAIN DESCRIPTION - FREQUENCY: FREQUENCY: CONTINUOUS

## 2023-03-25 ASSESSMENT — PAIN DESCRIPTION - ONSET: ONSET: ON-GOING

## 2023-03-25 NOTE — CARE COORDINATION
LSW noted PT/OT rec ARU. LSW met with pt, spouse and son to discuss therapy recs for ARU. Pt requesting ARU after dc d/t recent positive experience in Jan.  LSW encouraged plan B, such as SNF, should ARU be unable to accept pt for admission.   LSW LVM for [facility]/ARU with referral.  Electronically signed by CANDE Becerra on 3/25/2023 at 3:14 PM

## 2023-03-25 NOTE — CARE COORDINATION
GUILLERMINAW received CM consult for dc planning. GUILLERMINAW reviewed chart; noted PT/OT evals pending at this time. GUILLERMINAW requested PT/OT evals for dc recs via therapy WB.  LSW to await therapy recs to determine most prasanna plan of care. GUILLERMINAW noted pt dc'd from ARU 01/31/22. LSW anticipates pt will benefit from rehab post dc, likely SNF. Please note, admission to ARU or SNF does not warrant an ins precert. LSW following.   Electronically signed by CANDE Sargent on 3/25/2023 at 7:25 AM

## 2023-03-26 PROCEDURE — 1200000000 HC SEMI PRIVATE

## 2023-03-26 PROCEDURE — 6370000000 HC RX 637 (ALT 250 FOR IP): Performed by: ORTHOPAEDIC SURGERY

## 2023-03-26 PROCEDURE — 2580000003 HC RX 258: Performed by: ORTHOPAEDIC SURGERY

## 2023-03-26 PROCEDURE — 6370000000 HC RX 637 (ALT 250 FOR IP): Performed by: STUDENT IN AN ORGANIZED HEALTH CARE EDUCATION/TRAINING PROGRAM

## 2023-03-26 PROCEDURE — 6360000002 HC RX W HCPCS: Performed by: ORTHOPAEDIC SURGERY

## 2023-03-26 PROCEDURE — 6370000000 HC RX 637 (ALT 250 FOR IP)

## 2023-03-26 RX ORDER — ZOLPIDEM TARTRATE 5 MG/1
5 TABLET ORAL ONCE
Status: COMPLETED | OUTPATIENT
Start: 2023-03-26 | End: 2023-03-26

## 2023-03-26 RX ORDER — BENZONATATE 100 MG/1
100 CAPSULE ORAL 3 TIMES DAILY PRN
Status: DISCONTINUED | OUTPATIENT
Start: 2023-03-26 | End: 2023-03-29 | Stop reason: HOSPADM

## 2023-03-26 RX ADMIN — PREDNISOLONE ACETATE 1 DROP: 10 SUSPENSION/ DROPS OPHTHALMIC at 16:29

## 2023-03-26 RX ADMIN — SODIUM CHLORIDE, POTASSIUM CHLORIDE, SODIUM LACTATE AND CALCIUM CHLORIDE: 600; 310; 30; 20 INJECTION, SOLUTION INTRAVENOUS at 10:22

## 2023-03-26 RX ADMIN — BENZONATATE 100 MG: 100 CAPSULE ORAL at 10:22

## 2023-03-26 RX ADMIN — ACETAMINOPHEN 650 MG: 325 TABLET ORAL at 10:22

## 2023-03-26 RX ADMIN — INDOMETHACIN 50 MG: 25 CAPSULE ORAL at 12:04

## 2023-03-26 RX ADMIN — BENZONATATE 100 MG: 100 CAPSULE ORAL at 22:22

## 2023-03-26 RX ADMIN — PANTOPRAZOLE SODIUM 40 MG: 40 TABLET, DELAYED RELEASE ORAL at 06:50

## 2023-03-26 RX ADMIN — ACETAMINOPHEN 650 MG: 325 TABLET ORAL at 16:29

## 2023-03-26 RX ADMIN — KETOROLAC TROMETHAMINE 1 DROP: 5 SOLUTION OPHTHALMIC at 08:43

## 2023-03-26 RX ADMIN — SODIUM CHLORIDE, PRESERVATIVE FREE 5 ML: 5 INJECTION INTRAVENOUS at 08:38

## 2023-03-26 RX ADMIN — OFLOXACIN 1 DROP: 3 SOLUTION/ DROPS OPHTHALMIC at 08:43

## 2023-03-26 RX ADMIN — INDOMETHACIN 50 MG: 25 CAPSULE ORAL at 16:29

## 2023-03-26 RX ADMIN — INDOMETHACIN 50 MG: 25 CAPSULE ORAL at 08:46

## 2023-03-26 RX ADMIN — DOCUSATE SODIUM 100 MG: 100 CAPSULE, LIQUID FILLED ORAL at 08:43

## 2023-03-26 RX ADMIN — OFLOXACIN 1 DROP: 3 SOLUTION/ DROPS OPHTHALMIC at 21:58

## 2023-03-26 RX ADMIN — ACETAMINOPHEN 650 MG: 325 TABLET ORAL at 21:58

## 2023-03-26 RX ADMIN — AMLODIPINE BESYLATE 5 MG: 5 TABLET ORAL at 08:43

## 2023-03-26 RX ADMIN — ENOXAPARIN SODIUM 40 MG: 100 INJECTION SUBCUTANEOUS at 08:43

## 2023-03-26 RX ADMIN — ZOLPIDEM TARTRATE 5 MG: 5 TABLET ORAL at 22:22

## 2023-03-26 RX ADMIN — OFLOXACIN 1 DROP: 3 SOLUTION/ DROPS OPHTHALMIC at 12:05

## 2023-03-26 RX ADMIN — KETOROLAC TROMETHAMINE 1 DROP: 5 SOLUTION OPHTHALMIC at 16:29

## 2023-03-26 RX ADMIN — OFLOXACIN 1 DROP: 3 SOLUTION/ DROPS OPHTHALMIC at 16:28

## 2023-03-26 RX ADMIN — PREDNISOLONE ACETATE 1 DROP: 10 SUSPENSION/ DROPS OPHTHALMIC at 08:43

## 2023-03-26 ASSESSMENT — PAIN SCALES - GENERAL
PAINLEVEL_OUTOF10: 0

## 2023-03-26 NOTE — CARE COORDINATION
CANDE received VM from [facility], stating pt not a candidate for ARU at this time, however, pt could be reviewed for potential admission after pt receives her boot and is WBAT beginning 04/07/23. GUILLERMINAW met with pt, informed her that ARU is unable to accept her at this time, but is willing to re-review after receiving her boot and advances to WBAT on 04/07. Pt became tearful, but reported she understands. LSW discussed SNF as plan B and provided SNF list for review. Pt requested time to discuss SNF options with her . Pt reported [facility] in [facility] but doesn't know if she'd want to be in the same SNF. GUILLERMINAW advised SNF choice is needed soon, as dc is pend dc dispo. Pt voiced verb understanding and denied questions. CM staff to f/u Mon to solidify dc plans.   Electronically signed by CANDE Sargent on 3/26/2023 at 1:37 PM

## 2023-03-26 NOTE — CARE COORDINATION
Discussed patient with Dr Juan Ward. She would be best served and likely maximize her rehab with dc to SNF at this time and return to ARU ~4/7/23. On this date she will have a boot placed by Dr Darrian Clemente and have some wt bearing on her LLE. ARU will continue to follow for her dc placement and then follow up with the patient once she is settled in an area SNF.

## 2023-03-27 ENCOUNTER — APPOINTMENT (OUTPATIENT)
Dept: GENERAL RADIOLOGY | Age: 73
DRG: 493 | End: 2023-03-27
Payer: MEDICARE

## 2023-03-27 LAB
BACTERIA: NEGATIVE /HPF
BILIRUBIN URINE: NEGATIVE MG/DL
BLOOD, URINE: NEGATIVE
CLARITY: CLEAR
COLOR: YELLOW
GLUCOSE, URINE: NEGATIVE MG/DL
KETONES, URINE: NEGATIVE MG/DL
LEUKOCYTE ESTERASE, URINE: ABNORMAL
NITRITE URINE, QUANTITATIVE: NEGATIVE
PH, URINE: 7 (ref 5–8)
PROTEIN UA: NEGATIVE MG/DL
RBC URINE: 2 /HPF (ref 0–6)
SPECIFIC GRAVITY UA: 1.01 (ref 1–1.03)
SQUAMOUS EPITHELIAL: 1 /HPF
TRICHOMONAS: NORMAL /HPF
UROBILINOGEN, URINE: 0.2 MG/DL (ref 0.2–1)
WBC UA: NORMAL /HPF (ref 0–5)

## 2023-03-27 PROCEDURE — 97110 THERAPEUTIC EXERCISES: CPT

## 2023-03-27 PROCEDURE — 6370000000 HC RX 637 (ALT 250 FOR IP): Performed by: STUDENT IN AN ORGANIZED HEALTH CARE EDUCATION/TRAINING PROGRAM

## 2023-03-27 PROCEDURE — 6370000000 HC RX 637 (ALT 250 FOR IP): Performed by: ORTHOPAEDIC SURGERY

## 2023-03-27 PROCEDURE — 71045 X-RAY EXAM CHEST 1 VIEW: CPT

## 2023-03-27 PROCEDURE — 81001 URINALYSIS AUTO W/SCOPE: CPT

## 2023-03-27 PROCEDURE — 2580000003 HC RX 258: Performed by: ORTHOPAEDIC SURGERY

## 2023-03-27 PROCEDURE — 97530 THERAPEUTIC ACTIVITIES: CPT

## 2023-03-27 PROCEDURE — 6360000002 HC RX W HCPCS: Performed by: ORTHOPAEDIC SURGERY

## 2023-03-27 PROCEDURE — 1200000000 HC SEMI PRIVATE

## 2023-03-27 RX ORDER — ACETAMINOPHEN 325 MG/1
650 TABLET ORAL EVERY 6 HOURS PRN
Status: DISCONTINUED | OUTPATIENT
Start: 2023-03-27 | End: 2023-03-29 | Stop reason: HOSPADM

## 2023-03-27 RX ORDER — ACETAMINOPHEN 650 MG/1
650 SUPPOSITORY RECTAL EVERY 6 HOURS PRN
Status: DISCONTINUED | OUTPATIENT
Start: 2023-03-27 | End: 2023-03-29 | Stop reason: HOSPADM

## 2023-03-27 RX ADMIN — INDOMETHACIN 50 MG: 25 CAPSULE ORAL at 16:21

## 2023-03-27 RX ADMIN — KETOROLAC TROMETHAMINE 1 DROP: 5 SOLUTION OPHTHALMIC at 16:27

## 2023-03-27 RX ADMIN — PANTOPRAZOLE SODIUM 40 MG: 40 TABLET, DELAYED RELEASE ORAL at 06:35

## 2023-03-27 RX ADMIN — ACETAMINOPHEN 650 MG: 325 TABLET ORAL at 16:21

## 2023-03-27 RX ADMIN — ACETAMINOPHEN 650 MG: 325 TABLET ORAL at 10:36

## 2023-03-27 RX ADMIN — SODIUM CHLORIDE, PRESERVATIVE FREE 10 ML: 5 INJECTION INTRAVENOUS at 08:24

## 2023-03-27 RX ADMIN — PREDNISOLONE ACETATE 1 DROP: 10 SUSPENSION/ DROPS OPHTHALMIC at 08:23

## 2023-03-27 RX ADMIN — DOCUSATE SODIUM 100 MG: 100 CAPSULE, LIQUID FILLED ORAL at 08:23

## 2023-03-27 RX ADMIN — SODIUM CHLORIDE, POTASSIUM CHLORIDE, SODIUM LACTATE AND CALCIUM CHLORIDE: 600; 310; 30; 20 INJECTION, SOLUTION INTRAVENOUS at 00:26

## 2023-03-27 RX ADMIN — ENOXAPARIN SODIUM 40 MG: 100 INJECTION SUBCUTANEOUS at 08:25

## 2023-03-27 RX ADMIN — Medication 3 MG: at 22:02

## 2023-03-27 RX ADMIN — SODIUM CHLORIDE, PRESERVATIVE FREE 10 ML: 5 INJECTION INTRAVENOUS at 22:03

## 2023-03-27 RX ADMIN — BENZONATATE 100 MG: 100 CAPSULE ORAL at 04:20

## 2023-03-27 RX ADMIN — INDOMETHACIN 50 MG: 25 CAPSULE ORAL at 08:24

## 2023-03-27 RX ADMIN — AMLODIPINE BESYLATE 5 MG: 5 TABLET ORAL at 08:23

## 2023-03-27 RX ADMIN — KETOROLAC TROMETHAMINE 1 DROP: 5 SOLUTION OPHTHALMIC at 08:23

## 2023-03-27 RX ADMIN — SODIUM CHLORIDE, PRESERVATIVE FREE 10 ML: 5 INJECTION INTRAVENOUS at 21:28

## 2023-03-27 RX ADMIN — INDOMETHACIN 50 MG: 25 CAPSULE ORAL at 11:36

## 2023-03-27 RX ADMIN — PREDNISOLONE ACETATE 1 DROP: 10 SUSPENSION/ DROPS OPHTHALMIC at 16:27

## 2023-03-27 ASSESSMENT — PAIN SCALES - WONG BAKER: WONGBAKER_NUMERICALRESPONSE: 0

## 2023-03-27 ASSESSMENT — PAIN SCALES - GENERAL
PAINLEVEL_OUTOF10: 0
PAINLEVEL_OUTOF10: 1

## 2023-03-27 NOTE — CARE COORDINATION
Chart reviewed. Cm met with the patient at bedside. She states she has decided on [facility]. Cm called and faxed referral and let them know patient is medically ready for discharge.

## 2023-03-27 NOTE — PLAN OF CARE
Problem: Discharge Planning  Goal: Discharge to home or other facility with appropriate resources  Outcome: Progressing     Problem: Pain  Goal: Verbalizes/displays adequate comfort level or baseline comfort level  Outcome: Progressing     Problem: Skin/Tissue Integrity  Goal: Absence of new skin breakdown  Description: 1. Monitor for areas of redness and/or skin breakdown  2. Assess vascular access sites hourly  3. Every 4-6 hours minimum:  Change oxygen saturation probe site  4. Every 4-6 hours:  If on nasal continuous positive airway pressure, respiratory therapy assess nares and determine need for appliance change or resting period.   Outcome: Progressing     Problem: ABCDS Injury Assessment  Goal: Absence of physical injury  Outcome: Progressing     Problem: Safety - Adult  Goal: Free from fall injury  Outcome: Progressing     Problem: Musculoskeletal - Adult  Goal: Return mobility to safest level of function  Outcome: Progressing  Goal: Maintain proper alignment of affected body part  Outcome: Progressing  Goal: Return ADL status to a safe level of function  Outcome: Progressing

## 2023-03-28 PROCEDURE — 1200000000 HC SEMI PRIVATE

## 2023-03-28 PROCEDURE — 97530 THERAPEUTIC ACTIVITIES: CPT

## 2023-03-28 PROCEDURE — 94761 N-INVAS EAR/PLS OXIMETRY MLT: CPT

## 2023-03-28 PROCEDURE — 2580000003 HC RX 258: Performed by: ORTHOPAEDIC SURGERY

## 2023-03-28 PROCEDURE — 94150 VITAL CAPACITY TEST: CPT

## 2023-03-28 PROCEDURE — 6370000000 HC RX 637 (ALT 250 FOR IP): Performed by: ORTHOPAEDIC SURGERY

## 2023-03-28 PROCEDURE — 6370000000 HC RX 637 (ALT 250 FOR IP): Performed by: SURGERY

## 2023-03-28 PROCEDURE — 2700000000 HC OXYGEN THERAPY PER DAY

## 2023-03-28 PROCEDURE — 97110 THERAPEUTIC EXERCISES: CPT

## 2023-03-28 PROCEDURE — 6370000000 HC RX 637 (ALT 250 FOR IP): Performed by: STUDENT IN AN ORGANIZED HEALTH CARE EDUCATION/TRAINING PROGRAM

## 2023-03-28 PROCEDURE — 6360000002 HC RX W HCPCS: Performed by: ORTHOPAEDIC SURGERY

## 2023-03-28 RX ORDER — CODEINE PHOSPHATE AND GUAIFENESIN 10; 100 MG/5ML; MG/5ML
5 SOLUTION ORAL EVERY 4 HOURS PRN
Status: DISCONTINUED | OUTPATIENT
Start: 2023-03-28 | End: 2023-03-29 | Stop reason: HOSPADM

## 2023-03-28 RX ADMIN — INDOMETHACIN 50 MG: 25 CAPSULE ORAL at 12:59

## 2023-03-28 RX ADMIN — AMLODIPINE BESYLATE 5 MG: 5 TABLET ORAL at 09:02

## 2023-03-28 RX ADMIN — KETOROLAC TROMETHAMINE 1 DROP: 5 SOLUTION OPHTHALMIC at 09:04

## 2023-03-28 RX ADMIN — BENZONATATE 100 MG: 100 CAPSULE ORAL at 01:17

## 2023-03-28 RX ADMIN — INDOMETHACIN 50 MG: 25 CAPSULE ORAL at 09:01

## 2023-03-28 RX ADMIN — SODIUM CHLORIDE, PRESERVATIVE FREE 10 ML: 5 INJECTION INTRAVENOUS at 09:03

## 2023-03-28 RX ADMIN — Medication 1 LOZENGE: at 17:45

## 2023-03-28 RX ADMIN — KETOROLAC TROMETHAMINE 1 DROP: 5 SOLUTION OPHTHALMIC at 17:11

## 2023-03-28 RX ADMIN — PANTOPRAZOLE SODIUM 40 MG: 40 TABLET, DELAYED RELEASE ORAL at 05:58

## 2023-03-28 RX ADMIN — ENOXAPARIN SODIUM 40 MG: 100 INJECTION SUBCUTANEOUS at 09:02

## 2023-03-28 RX ADMIN — INDOMETHACIN 50 MG: 25 CAPSULE ORAL at 17:11

## 2023-03-28 RX ADMIN — DOCUSATE SODIUM 100 MG: 100 CAPSULE, LIQUID FILLED ORAL at 09:02

## 2023-03-28 RX ADMIN — BENZONATATE 100 MG: 100 CAPSULE ORAL at 17:46

## 2023-03-28 RX ADMIN — Medication 3 MG: at 22:52

## 2023-03-28 RX ADMIN — PREDNISOLONE ACETATE 1 DROP: 10 SUSPENSION/ DROPS OPHTHALMIC at 09:04

## 2023-03-28 RX ADMIN — SODIUM CHLORIDE, PRESERVATIVE FREE 10 ML: 5 INJECTION INTRAVENOUS at 00:03

## 2023-03-28 RX ADMIN — PREDNISOLONE ACETATE 1 DROP: 10 SUSPENSION/ DROPS OPHTHALMIC at 17:12

## 2023-03-28 ASSESSMENT — PAIN DESCRIPTION - LOCATION
LOCATION: CHEST
LOCATION: CHEST

## 2023-03-28 ASSESSMENT — PAIN SCALES - GENERAL
PAINLEVEL_OUTOF10: 1
PAINLEVEL_OUTOF10: 1
PAINLEVEL_OUTOF10: 0

## 2023-03-28 NOTE — CARE COORDINATION
CM called [employer]. Patient is approved for [facility] and can go at any time. Cm updated the doctor. Patient will need a rapid covid and joi. Please call report to [phone], include AVS and JOI in packet to go with the patient.

## 2023-03-28 NOTE — PLAN OF CARE
Problem: Discharge Planning  Goal: Discharge to home or other facility with appropriate resources  3/27/2023 2222 by Bal Goodwin LPN  Outcome: Progressing  3/27/2023 1121 by Luis Manuel Estevez RN  Outcome: Progressing     Problem: Pain  Goal: Verbalizes/displays adequate comfort level or baseline comfort level  3/27/2023 2222 by Bal Goodwin LPN  Outcome: Progressing  3/27/2023 1121 by Luis Manuel Estevez RN  Outcome: Progressing     Problem: Skin/Tissue Integrity  Goal: Absence of new skin breakdown  Description: 1. Monitor for areas of redness and/or skin breakdown  2. Assess vascular access sites hourly  3. Every 4-6 hours minimum:  Change oxygen saturation probe site  4. Every 4-6 hours:  If on nasal continuous positive airway pressure, respiratory therapy assess nares and determine need for appliance change or resting period.   3/27/2023 2222 by Bal Goodwin LPN  Outcome: Progressing  3/27/2023 1121 by Luis Manuel Estevez RN  Outcome: Progressing     Problem: ABCDS Injury Assessment  Goal: Absence of physical injury  3/27/2023 2222 by Bal Goodwin LPN  Outcome: Progressing  3/27/2023 1121 by Luis Manuel Estevez RN  Outcome: Progressing     Problem: Safety - Adult  Goal: Free from fall injury  3/27/2023 2222 by Bal Goodwin LPN  Outcome: Progressing  3/27/2023 1121 by Luis Manuel Estevez RN  Outcome: Progressing     Problem: Musculoskeletal - Adult  Goal: Return mobility to safest level of function  3/27/2023 2222 by Bal Goodwin LPN  Outcome: Progressing  3/27/2023 1121 by Luis Manuel Estevez RN  Outcome: Progressing  Goal: Maintain proper alignment of affected body part  3/27/2023 2222 by Bal Goodwin LPN  Outcome: Progressing  3/27/2023 1121 by Luis Manuel Estevez RN  Outcome: Progressing  Goal: Return ADL status to a safe level of function  3/27/2023 2222 by Bal Goodwin LPN  Outcome: Progressing  3/27/2023 1121 by Luis Manuel Estevez RN  Outcome: Progressing

## 2023-03-28 NOTE — PROGRESS NOTES
03/23/23 1005   Encounter Summary   Encounter Overview/Reason  Attempted Encounter   Service Provided For: Patient not available   Last Encounter  03/23/23  (attempted visit.  Patient was not available at the time of visit)   Begin Time 1006   End Time  1010   Total Time Calculated 4 min   Assessment/Intervention/Outcome   Assessment Unable to assess   Plan and Referrals   Plan/Referrals Continue to visit, (comment)  (as needed)
03/23/23 1532   Encounter Summary   Encounter Overview/Reason  Initial Encounter; Encounter   Service Provided For: Family   Referral/Consult From: [facility] [place]   Support System Spouse; Children   Last Encounter  03/23/23  (Catherin Safer: Patient is having surgery tomorrow morning. She says she is scared. She asked for the Sacrament of the Sick and [employer]. I administered both, prayed for her and offered emotional support.)   Complexity of Encounter Moderate   Begin Time 1515   End Time  1538   Total Time Calculated 23 min   Encounter    Type Initial Screen/Assessment   Crisis   Type Trauma   Spiritual/Emotional needs   Type Spiritual Support   Rituals, Rites and Sacraments   Type Sacrament of Sick;Evangelical Communion   Assessment/Intervention/Outcome   Assessment Anxious; Fearful   Intervention Nurtured Hope;Prayer (assurance of)/[place]; Active listening   Outcome Expressed feelings, needs, and concerns;Comfort;Engaged in conversation   Plan and Referrals   Plan/Referrals Continue to visit, (comment)
03/24/23 1739   Encounter Summary   Encounter Overview/Reason  [address] [facility],[address] Encounter   Service Provided For: Patient and family together   Referral/Consult From: Rounding   Support System Spouse; Children   Last Encounter  03/24/23  ([place] Cousins: This was Communion Round> I prayed with patient and  and gave thm Holy Communion. She was pleased to receive.)   Complexity of Encounter Low   Begin Time 1735   End Time  1742   Total Time Calculated 7 min   Encounter    Type Follow up   Spiritual/Emotional needs   Type Spiritual Support   Rituals, Rites and Sacraments   Type Faith Communion   Assessment/Intervention/Outcome   Assessment Calm; Hopeful;Peaceful   Intervention Active listening;Nurtured Hope;Prayer (assurance of)/[place]   Outcome Comfort;Coping;Encouraged;Engaged in conversation;Peace   Plan and Referrals   Plan/Referrals Continue to visit, (comment)
1250 ancef 2 gram pulled and not given, handed to 99 Meghann Haas rn/sammy cummings crna
4 Eyes Skin Assessment     NAME:  Carlitos Lowry  YOB: 1950  MEDICAL RECORD NUMBER:  [id number]    The patient is being assessed for  Admission    I agree that One RN has performed a thorough Head to Toe Skin Assessment on the patient. ALL assessment sites listed below have been assessed. Areas assessed by both nurses:    Head, Face, Ears, Shoulders, Back, Chest, Arms, Elbows, Hands, Sacrum. Buttock, Coccyx, Ischium, and Legs. Feet and Heels        Does the Patient have a Wound?  No noted wound(s)       Femi Prevention initiated by RN: No   Wound Care Orders initiated by RN: No    Pressure Injury (Stage 3,4, Unstageable, DTI, NWPT, and Complex wounds) if present, place referral order by RN under : No    New and Established Ostomies, if present place, referral order under : No      Nurse 1 eSignature: Electronically signed by Natalee Hernadez RN on 3/23/23 at 4:40 AM EDT    **SHARE this note so that the co-signing nurse can place an eSignature**    Nurse 2 eSignature: Electronically signed by Kamla Cloud RN on 3/23/23 at 4:41 AM EDT
Lab reported a critical troponin of 0.40. Provider notified.
[facility] [place] PHYSICAL REHABILITATION [place]  [address] [facility],Third Floor  Phone: [phone]    Fax [phone]                  Letha Packer MD, Billy Bishop MD, Errol Monsivais MD, MD Andria Zhao MD Wannetta Celestine, MD Lorri Media, MD Dr. Iola Roads MD Davie Evener, ONEL Johnson, ONEL Coburn, ONEL Valentin, ONEL Garcia PA-C    CARDIOLOGY  NOTE      Name:  Federico Panda /Age/Sex: 1950  (68 y.o. female)   MRN & CSN:  [id number] & [id number] Admission Date/Time: 3/22/2023  8:36 PM   Location:  [address] [place] [address] PCP: Josh Schwarz MD       Hospital Day: 3    - Cardiology consult is for: Hypertension      ASSESSMENT/ PLAN:  Elevated troponin  -Positive but flat, not endorsing any chest pain, EKG without ischemic changes noted  -Echo per below  Hypotension with history of hypertension  -Stable  -On norvasc 5 mg daily  Left bimalleolar fracture s/p open reduction internal fixation 3/24/2023  Shortness of breath  -CXR showing basilar scarring/atelectasis. On supplemental oxygen  Inclusion body myositis        Echo: 3/23/2023   Left ventricular systolic function is hyperdynamic. Ejection fraction is visually estimated at >60%. Small left ventricle; possible low volume status. Mild left ventricular hypertrophy. Sclerotic, but non-stenotic aortic valve. Mitral annular calcification is present. No evidence of any pericardial effusion. Atherosclerotic plaque noted in the abdominal aorta. Subjective:  Joceline Mathews is a 68 y. o.year old     Chest pain and shortness of breath at this time but patient is requiring 2 L of oxygen. Patient is experiencing some chest congestion and has some crackles noted on physical exam.      She is to undergo surgery on her left ankle today    Objective: Temperature:  Current - Temp: 98.2 °F (36.8 °C);  Max - Temp  Av.8 °F
Patient is working with physical therapy this morning. No complaints. Afebrile and vital signs are stable    Left lower extremity:  Splint is in place. Toes are warm and well-perfused. Compartments are soft. Postoperative day 1. left ankle open reduction and internal fixation    -Proceed with PT and OT. Nonweightbearing left lower extremity. Patient's rehab potential is complicated given her underlying myositis and difficulty with use of her right lower extremity.    -I explained the patient that she may rely heavily on wheelchair and that her mobility might be limited to transfers given her underlying deconditioning and myositis issues.    -Okay to discharge from orthopedic perspective when medically stable. Patient should have follow-up in my office in 10 to 14 days for x-rays and wound check and suture removal.  We will likely transition her to a boot with early protected weightbearing at that time.
Patient twisted her left ankle yesterday, because her leg gave out when she was walking. She denies any chest pain or heart problems. Does have AAA. She also feels that she is developing a gout flare up in her left big toe. Has inclusion body myositis. She was hypotensive and diaphoretic and dizzy. Hold BP meds. She feels this was reaction to percocet. Check echo. Consult cardiology. Check ekg, and troponins. Place on telemetry. Her left foot is wrapped up to knee. Left great toe is tender to palpation on medial side. Elevated troponin may be due to inclusion body myositis. Surgery tomorrow. Start indomethacin for gout flareup of left big toe.
Per Clary Saunders NP ok to give Amlodipine d/t increased BP
Physical Therapy    Physical Therapy Treatment Note  Name: Frod Cheema MRN: [id number] :   1950   Date:  3/28/2023   Admission Date: 3/22/2023 Room:  [address] [place] [address]   Restrictions/Precautions: fall risk; general precautions; NWB on LLE  Communication with other providers:  RN handoff  Subjective:  Patient states:  \"I'm just so scared of falling\"  Pain:   Location, Type, Intensity (0/10 to 10/10):  denies at rest  Objective:    Observation:  Supine, awake, alert, agreeable. She is fearful of falling, better able to participate w/ husbands encouragement. Tele, bed alarm in place. Treatment, including education/measures:  Therapeutic Activity Training:   Therapeutic activity training was instructed today. Cues were given for safety, sequence, UE/LE placement, awareness, and balance. Activities performed today included bed mobility training, sup-sit, sit-stand, SPT. Supine <-> sit: SBA  Seated balance: good  Sit <-> stand: mod A   Stand pivot: mod A ; cues to attend to fore/hind foot pivot - able to demonstrate small hop to steps  Stand <-> sit: min a  Provided extensive safety education, functional mobility education, general mobility education, use of maintenance program, POC, prognosis for future mobility  Left in recliner, chair alarmed, call light in reach, gait belt for OOB, all needs met    Therapeutic Exercise:  Cues were given for technique, safety, recruitment, and rationale. Cues were verbal and/or tactile. 10 resisted/assisted LAQ's, resisted marches, resisted iso hip abd/add, quad sets    Assessment / Impression:    Pt is agreeable and participatory, she is fearful during OOB completion and needs max cueing. Provided additional education and safety guidance ; she remains very limited by strength, balance, functional mobility, neuro weakness. Will cont to benefit from subacute care.     Patient's tolerance of treatment:  fair +  Barriers to improvement:  fear of falling, comorbid
Physical Therapy  Attempt Note    On arrival pt stating she got no sleep, will not be able to attempt on this date. Reinforced need for OOB, role in return to function, need for inc OOB ; she cont to communicate concerns noted above. Will reattempt as time and pt disposition allows.     Jagdeep Haynes PT, DPT
Physical Therapy  [facility] ACUTE CARE PHYSICAL THERAPY EVALUATION  Tmaara García, 1950, 1116/1116-A, 3/25/2023    History  Samish:  The primary encounter diagnosis was Fall, initial encounter. A diagnosis of Closed bimalleolar fracture of left ankle, initial encounter was also pertinent to this visit. Patient  has a past medical history of Abdominal aortic aneurysm (AAA) >39 mm diameter, Hypertension, and IBS (irritable bowel syndrome). Patient  has a past surgical history that includes Tonsillectomy. Assessment:  Pt presents 3 days s/p admission for fall at home while ambulating ; suffered L mild displaced med malleolus fx and nondisplaced lat malleolus fx ; underwent ORIF, now NWB for 4-6 weeks, POD 1. Pt is from home w/ spouse, in multilevel home, stair lift, extensive AE, uses walker at baseline, inc freq of falls. Pt is primarily limited by pain, balance, cognition, additional deficits include strength, ROM, endurance. She is unable to ambulate at this time, requires heavy assist for OOB mobility, pain impacting all function. Recommending ARU. Complexity: Moderate  Prognosis: Fair ; comorbid conditions, pain, deconditioning, inc hx of falls  Plan 4-5+/week, 1 week    Equipment: pend to next LOC    Recommendations for NURSING mobility: SPT    Subjective:  Patient states:  \"I'm so scared I'm Authur Silvan fall\"    Pain:  5/10 post-surgical pain and extreme anxiety impacting performance.     Communication with other providers:  Handoff to RN, co-eval with LIZ Turpin  Restrictions: fall risk; general precautions; L LE NWB    Home Setup/Prior level of function  Social/Functional History  Lives With: Spouse (who is in good health)  Type of Home: House  Home Layout: Multi-level (has stair lift to 2nd floor)  Home Access: Stairs to enter without rails  Entrance Stairs - Number of Steps: 2  Bathroom Shower/Tub: Tub/Shower unit  Bathroom Toilet: Standard  Bathroom Equipment: Shower chair, Grab bars in
V2.0  [facility] Hospitalist Progress Note      Name:  Esther Baeza /Age/Sex: 1950  (68 y.o. female)   MRN & CSN:  [id number] & [id number] Encounter Date/Time: 3/24/2023 10:27 AM EDT    [facility]:  [facility]- PCP: Peng Johnson MD       Hospital Day: 3    Assessment and Plan:   Esther Baeza is a 68 y.o. female who presents with left bimalleolar fracture      Plan:  Left bimalleolar fracture s/p ORIF 3/24-3/24: Due to mechanical fall. X-ray left ankle: Acute mildly displaced medial malleolus and acute nondisplaced lateral malleolar fractures. Left ankle ORIF 3/24. Left toe gout-3/24: Started indomethacin. Toe pain has resolved. Possible hypoxia-3/24: On 2 L nasal cannula. Wean oxygen as tolerated. Elevated troponin-3/24: Troponin 0.410. Possibly due to inclusion body myositis. EKG: Sinus bradycardia. Cardiology following. Echo 3/23: EF> 60%. Not having chest pain. 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 Hypertension-3/24: Amlodipine. Infrarenal AAA-3/24: 3.6 cm in 2021. History of right foot drop    Diet Diet NPO    DVT Prophylaxis [x] Lovenox, [] Heparin, [] Eliquis, [] Xarelto  [] SCDs       Code Status Full Code   Disposition From: Home  Expected Disposition: SNF  Estimated Date of Discharge: 3/27  Patient requires continued admission due to awaiting clearance by orthopedic surgery   Home O2 None     Subjective/Interval history:   Chief Complaint: Fall and Leg Injury     Patient states her foot is feeling better today. Review of Systems:    Negative unless mentioned above    Objective:      Intake/Output Summary (Last 24 hours) at 3/24/2023 1027  Last data filed at 3/24/2023 0934  Gross per 24 hour   Intake 10 ml   Output --   Net 10 ml        Vitals:   BP (!) 153/93   Pulse 78   Temp 98 °F (36.7 °C) (Oral)   Resp 16   Ht 5' 3\" (1.6 m)   Wt 130 lb (59 kg)   SpO2 94%   BMI 23.03 kg/m²     Physical Exam:   General: NAD, laying in bed, mild
V2.0  [facility] Hospitalist Progress Note      Name:  Onofre Casanova /Age/Sex: 1950  (68 y.o. female)   MRN & CSN:  [id number] & [id number] Encounter Date/Time: 3/27/2023 10:27 AM EDT    Location:  [address] [place] [address] PCP: Amparo Martin MD       Hospital Day: 6    Assessment and Plan:   Onofre Casanova is a 68 y.o. female who presents with left bimalleolar fracture      Plan:  Left bimalleolar fracture s/p ORIF   Due to mechanical fall. X-ray left ankle: Acute mildly displaced medial malleolus and acute nondisplaced lateral malleolar fractures. Left ankle ORIF 3/24. PT/OT, WBAT, cleared by Ortho for d/c. Not candidate for ARU. Pending placement to SNF  Left toe gout:   Started indomethacin. Toe pain has resolved.   hypoxia:   On 2 L nasal cannula. CXR with evidence of atelectasis. Wean oxygen as tolerated to maintain O2 sat >88%. Elevated troponin:   Troponin 0.410. Possibly due to inclusion body myositis. EKG: Sinus bradycardia. Cardiology following. Echo 3/23: EF> 60%. Not having chest pain. Inclusion body myositis:   Diagnosed at age 64 at [facility]. Hypertension: Amlodipine. Infrarenal AAA: 3.6 cm in 2021. History of right foot drop    Diet ADULT DIET; Regular    DVT Prophylaxis [x] Lovenox, [] Heparin, [] Eliquis, [] Xarelto  [] SCDs       Code Status Full Code   Disposition From: Home  Expected Disposition: SNF  Estimated Date of Discharge: Medically stable for discharge pending placement     Home O2 None     Subjective/Interval history:   Chief Complaint: Fall and Leg Injury     Seen and evaluated at bedside. Patient feels upset this AM as she will no longer be able to go out and do activities due to her fracture. She denies nausea, vomiting, CP, SOB, cough, fevers, chills. Review of Systems:    Negative unless mentioned above    Objective:      Intake/Output Summary (Last 24 hours) at 3/27/2023 1446  Last data filed at 3/27/2023 0731  Gross per 24 hour   Intake --   Output 8575
V2.0  [facility] Hospitalist Progress Note      Name:  Tressa Fernandez /Age/Sex: 1950  (68 y.o. female)   MRN & CSN:  [id number] & [id number] Encounter Date/Time: 3/26/2023 10:27 AM EDT    Location:  [address] PCP: Pricila Fitzpatrick MD       Hospital Day: 5    Assessment and Plan:   Tressa Fernandez is a 68 y.o. female who presents with left bimalleolar fracture      Plan:  Left bimalleolar fracture s/p ORIF   Due to mechanical fall. X-ray left ankle: Acute mildly displaced medial malleolus and acute nondisplaced lateral malleolar fractures. Left ankle ORIF 3/24. PT/OT, WBAT, cleared by Ortho for d/c. Pending placement for ARU  Left toe gout:   Started indomethacin. Toe pain has resolved. Possible hypoxia:   On 2 L nasal cannula. Wean oxygen as tolerated. Elevated troponin:   Troponin 0.410. Possibly due to inclusion body myositis. EKG: Sinus bradycardia. Cardiology following. Echo 3/23: EF> 60%. Not having chest pain. Inclusion body myositis:   Diagnosed at age 64 at [facility]. Hypertension: Amlodipine. Infrarenal AAA: 3.6 cm in 2021. History of right foot drop    Diet ADULT DIET; Regular    DVT Prophylaxis [x] Lovenox, [] Heparin, [] Eliquis, [] Xarelto  [] SCDs       Code Status Full Code   Disposition From: Home  Expected Disposition: SNF  Estimated Date of Discharge: 3/27  Patient requires continued admission due to awaiting placement   Home O2 None     Subjective/Interval history:   Chief Complaint: Fall and Leg Injury     Seen and evaluated at bedside. Patient feels better today. C/o coughing that kept her up the whole night. Patient c/o low mood and emotional state after being told by ortho that she will be wheelchair dependent after her most recent fall as her myositis has progressed to a point where she cannot safely ambulate using a walker. Denies any other active complaints. Emotional support provided.      Review of Systems:    Negative unless mentioned above    Objective:
18   Ht 5' 3\" (1.6 m)   Wt 130 lb (59 kg)   SpO2 93%   BMI 23.03 kg/m²     Physical Exam:   General: NAD, laying in bed, mild drowsiness from muscle relaxant  Eyes: no discharge  HENT: NCAT  Cardiovascular: RRR  Respiratory: Clear to auscultation, on nasal canula  Gastrointestinal: Soft, non tender, nondistended  Genitourinary: no suprapubic tenderness  Musculoskeletal: left foot is wrapped in splint, no tenderness to palpation of great toe of left foot  Skin: warm, dry, no gross lesions  Neuro: Alert. No gross deficits  Psych: Mood appropriate.      Medications:   Medications:    melatonin  3 mg Oral Nightly    sodium chloride flush  5-40 mL IntraVENous 2 times per day    sodium chloride flush  5-40 mL IntraVENous 2 times per day    enoxaparin  40 mg SubCUTAneous Daily    amLODIPine  5 mg Oral Daily    docusate sodium  100 mg Oral Daily    pantoprazole  40 mg Oral QAM AC    sodium chloride flush  5-40 mL IntraVENous 2 times per day    indomethacin  50 mg Oral TID WC    ketorolac  1 drop Right Eye BID    prednisoLONE acetate  1 drop Right Eye BID      Infusions:    sodium chloride      sodium chloride      sodium chloride 50 mL/hr at 03/24/23 1208     PRN Meds: guaiFENesin-codeine, 5 mL, Q4H PRN  Benzocaine-Menthol, 1 lozenge, Q2H PRN  acetaminophen, 650 mg, Q6H PRN   Or  acetaminophen, 650 mg, Q6H PRN  benzonatate, 100 mg, TID PRN  sodium chloride flush, 5-40 mL, PRN  sodium chloride, , PRN  sodium chloride flush, 5-40 mL, PRN  sodium chloride, , PRN  aluminum & magnesium hydroxide-simethicone, 15 mL, Q6H PRN  HYDROcodone-acetaminophen, 1 tablet, Q4H PRN  tiZANidine, 4 mg, Q6H PRN  sodium chloride flush, 5-40 mL, PRN  sodium chloride, , PRN  ondansetron, 4 mg, Q8H PRN   Or  ondansetron, 4 mg, Q6H PRN  polyethylene glycol, 17 g, Daily PRN      Labs         Lab Results   Component Value Date/Time    MG 1.7 01/21/2023 03:50 AM           Electronically signed by Vidhi Calderon MD on 3/28/2023 at 9:57 AM
HR and o2 sats throughout session    Body Systems and functions:  ROM: WNL all joints in BL UEs (exception is Lt hand PIP and DIP joints which have difficulty with flexion)  Strength: 4+/5 MMT all major muscle groups BL UEs (exception is Lt hand with grasp of 4-/5 MMT-difficulty with flexing PIP and DIP joints)  Sensation: WFL in BL UEs (See PT evaluation for LE assessment)  Tone: Normal  Coordination: WFL in Rt hand, decreased functional use of Lt hand 2/2 limited PIP and DIP joints    Activities of Daily Living (ADLs):  Feeding: Supervision/setup (in High-Cha's position; setup for cutting up food and opening packages/containers)  Grooming: SBA (seated facial hygiene task EOB; not feasible to complete in standing at this time due to NWB status Lt LE)  UB bathing: SBA   LB bathing: Max A (reaching distal LEs, bottom, posterior thighs)  UB dressing: SBA (donning clean robe seated EOB)  LB dressing: Dependent (donning Rt sock)  Toileting: Dependent     Cognitive and Psychosocial Functioning:  Overall cognitive status: WNL (anxious behaviors noted throughout session)  Affect: Normal     Balance:   Sitting: SBA in unsupported sitting EOB  Standing: CGA to min A with RW on Rt LE    Functional Mobility:  Bed Mobility: SBA supine to sitting EOB, SBA sitting EOB to supine (HOB elevated to 30', increased time/effort required each direction)  Transfers: Mod A x 2 sit to stand from bed on Rt LE, Mod A x 2 stand to sit to bed on Rt LE (mod cues for technique/safe hand placement and maintaining NWB status Lt LE)  Ambulation: Unsafe/unable this date      AM-PAC 6 click short form for inpatient daily activity:   How much help from another person does the patient currently need... Unable  Dep A Lot  Max A A Lot   Mod A A Little  Min A A Little   CGA  SBA None   Mod I  Indep  Sup   1.  Putting on and taking off regular lower body clothing? [x] 1    [] 2   [] 2   [] 3   [] 3   [] 4      2. Bathing (including washing, rinsing, 
strength for functional mobility  Short Term Goal 4: pt will complete SPT at min A  Short Term Goal 5: pt will propel w/c 150 ft w/ min A    Electronically signed by:    Abhay White PTA  3/27/2023, 12:47 PM
03/24/23 0222   * 139   K 4.0 4.0   CL 94* 103   CO2 28 29   BUN 25* 30*   CREATININE 0.7 1.1       Recent Labs     03/22/23  2242 03/24/23 0222   AST 33 20   ALT 21 18   BILITOT 0.3 0.5   ALKPHOS 74 73       No results for input(s): INR in the last 72 hours.   Recent Labs     03/23/23  1021 03/23/23  1434 03/24/23 0222   CKTOTAL  --   --  102   TROPONINT 0.464* 0.399* 0.410*       No results found for: LABA1C  CALCIUM:  8.9/29 (03/24 0222)  Lab Results   Component Value Date/Time    MG 1.7 01/21/2023 03:50 AM           Electronically signed by Luba Gomez MD on 3/25/2023 at 9:46 AM

## 2023-03-29 VITALS
RESPIRATION RATE: 18 BRPM | OXYGEN SATURATION: 93 % | SYSTOLIC BLOOD PRESSURE: 127 MMHG | HEIGHT: 63 IN | HEART RATE: 100 BPM | BODY MASS INDEX: 23.04 KG/M2 | TEMPERATURE: 97.9 F | WEIGHT: 130 LBS | DIASTOLIC BLOOD PRESSURE: 69 MMHG

## 2023-03-29 LAB
SARS-COV-2 RDRP RESP QL NAA+PROBE: NOT DETECTED
SOURCE: NORMAL

## 2023-03-29 PROCEDURE — 6370000000 HC RX 637 (ALT 250 FOR IP): Performed by: ORTHOPAEDIC SURGERY

## 2023-03-29 PROCEDURE — 6360000002 HC RX W HCPCS: Performed by: ORTHOPAEDIC SURGERY

## 2023-03-29 PROCEDURE — 94664 DEMO&/EVAL PT USE INHALER: CPT

## 2023-03-29 PROCEDURE — 2580000003 HC RX 258: Performed by: ORTHOPAEDIC SURGERY

## 2023-03-29 PROCEDURE — 94761 N-INVAS EAR/PLS OXIMETRY MLT: CPT

## 2023-03-29 PROCEDURE — 2700000000 HC OXYGEN THERAPY PER DAY

## 2023-03-29 PROCEDURE — 89220 SPUTUM SPECIMEN COLLECTION: CPT

## 2023-03-29 PROCEDURE — 94150 VITAL CAPACITY TEST: CPT

## 2023-03-29 PROCEDURE — 87635 SARS-COV-2 COVID-19 AMP PRB: CPT

## 2023-03-29 RX ORDER — HYDROCODONE BITARTRATE AND ACETAMINOPHEN 7.5; 325 MG/1; MG/1
1 TABLET ORAL EVERY 6 HOURS PRN
Qty: 20 TABLET | Refills: 0 | Status: SHIPPED | OUTPATIENT
Start: 2023-03-29 | End: 2023-03-29 | Stop reason: HOSPADM

## 2023-03-29 RX ORDER — CODEINE PHOSPHATE AND GUAIFENESIN 10; 100 MG/5ML; MG/5ML
5 SOLUTION ORAL EVERY 4 HOURS PRN
Qty: 420 ML | Refills: 0 | Status: SHIPPED | OUTPATIENT
Start: 2023-03-29 | End: 2023-04-01

## 2023-03-29 RX ORDER — LANOLIN ALCOHOL/MO/W.PET/CERES
3 CREAM (GRAM) TOPICAL NIGHTLY
Qty: 30 TABLET | Refills: 0 | Status: SHIPPED | OUTPATIENT
Start: 2023-03-29

## 2023-03-29 RX ORDER — PSEUDOEPHEDRINE HCL 30 MG
100 TABLET ORAL DAILY
Qty: 60 CAPSULE | Refills: 0 | Status: SHIPPED | OUTPATIENT
Start: 2023-03-29

## 2023-03-29 RX ORDER — BENZONATATE 100 MG/1
100 CAPSULE ORAL 3 TIMES DAILY PRN
Qty: 30 CAPSULE | Refills: 0 | Status: SHIPPED | OUTPATIENT
Start: 2023-03-29 | End: 2023-04-05

## 2023-03-29 RX ADMIN — INDOMETHACIN 50 MG: 25 CAPSULE ORAL at 08:27

## 2023-03-29 RX ADMIN — SODIUM CHLORIDE, PRESERVATIVE FREE 10 ML: 5 INJECTION INTRAVENOUS at 08:14

## 2023-03-29 RX ADMIN — PANTOPRAZOLE SODIUM 40 MG: 40 TABLET, DELAYED RELEASE ORAL at 06:54

## 2023-03-29 RX ADMIN — DOCUSATE SODIUM 100 MG: 100 CAPSULE, LIQUID FILLED ORAL at 08:15

## 2023-03-29 RX ADMIN — SODIUM CHLORIDE, PRESERVATIVE FREE 10 ML: 5 INJECTION INTRAVENOUS at 08:15

## 2023-03-29 RX ADMIN — ENOXAPARIN SODIUM 40 MG: 100 INJECTION SUBCUTANEOUS at 08:15

## 2023-03-29 RX ADMIN — KETOROLAC TROMETHAMINE 1 DROP: 5 SOLUTION OPHTHALMIC at 08:13

## 2023-03-29 RX ADMIN — AMLODIPINE BESYLATE 5 MG: 5 TABLET ORAL at 08:23

## 2023-03-29 RX ADMIN — PREDNISOLONE ACETATE 1 DROP: 10 SUSPENSION/ DROPS OPHTHALMIC at 08:14

## 2023-03-29 ASSESSMENT — PAIN SCALES - GENERAL: PAINLEVEL_OUTOF10: 0

## 2023-03-29 ASSESSMENT — PAIN SCALES - WONG BAKER
WONGBAKER_NUMERICALRESPONSE: 0
WONGBAKER_NUMERICALRESPONSE: 0

## 2023-03-29 NOTE — ANESTHESIA POSTPROCEDURE EVALUATION
Department of Anesthesiology  Postprocedure Note    Patient: Zhou Olivares  MRN: [id number]  YOB: 1950  Date of evaluation: 3/29/2023      Procedure Summary     Date: 03/24/23 Room / Location: [address] [place] [address] OR [address] [place] [address]    Anesthesia Start: 1253 Anesthesia Stop: 2771    Procedure: LEFT ANKLE OPEN REDUCTION INTERNAL FIXATION (Left: Ankle) Diagnosis:       Closed fracture of left ankle, initial encounter      (Closed fracture of left ankle, initial encounter [M92.482Q])    Surgeons: Gerson Moses MD Responsible Provider: Shabnam De La Fuente MD    Anesthesia Type: general, regional ASA Status: 3          Anesthesia Type: No value filed.     Kayce Phase I: Kayce Score: 9    Kayce Phase II:        Anesthesia Post Evaluation    Patient location during evaluation: PACU  Patient participation: complete - patient cannot participate  Level of consciousness: awake and alert  Pain score: 0  Airway patency: patent  Nausea & Vomiting: no nausea and no vomiting  Complications: no  Cardiovascular status: blood pressure returned to baseline  Respiratory status: acceptable  Hydration status: euvolemic  Comments: Late entry

## 2023-03-29 NOTE — DISCHARGE SUMMARY
V2.0  Discharge Summary    Name:  Una Paz /Age/Sex: 1950 (9 y.o. female)   Admit Date: 3/22/2023  Discharge Date: 3/29/23    MRN & CSN:  [id number] & [id number] Encounter Date and Time 3/29/23 9:56 AM EDT    Attending:  Binu Quinonez MD Discharging Provider: Binu Quinonez MD       Discharge Diagnosess:     Left bimalleolar fracture requiring ORIF   Left toe gout flare  Acute hypoxia  Demand ischemia  Inclusion body myositis causing chronic weakness  HTN  Infrarenal AAA - stable      Admission HPI, hospital course, and consultants:     Admission HPI:  Malini Salazar is a 68 y.o. female with pmh of inclusion body myositis, AAA, hypertension who presents with fall from standing height. Due to history of inclusion body myositis, patient has history of weakness and recurrent falls. Today she was walking in the kitchen with her walker and she suddenly fell down. Denies any dizziness, lightheadedness prior to that. Neither she tripped on any object. Post fall, she could not bear weight and could see her left ankle being swollen up and deformed. On presentation to ED, work-up revealed unremarkable BMP, baseline WBC and hemoglobin. X-ray of the left ankle showed acute mildly displaced bimalleolar fractures. Orthopedic surgery was consulted from the ED. Splint has been applied. [facility] course:   Left bimalleolar fracture s/p ORIF   Due to mechanical fall. X-ray left ankle: Acute mildly displaced medial malleolus and acute nondisplaced lateral malleolar fractures. Left ankle ORIF 3/24. PT/OT, WBAT, cleared by Ortho for d/c. Not candidate for ARU. Pending placement to SNF  Left toe gout:   Started indomethacin. Toe pain has resolved.   hypoxia:   On 2 L nasal cannula. CXR with evidence of atelectasis. Wean oxygen as tolerated to maintain O2 sat >88%. Elevated troponin:   Troponin 0.410. Possibly due to inclusion body myositis. EKG: Sinus bradycardia. Cardiology following.   Echo

## 2023-03-29 NOTE — DISCHARGE INSTR - COC
Continuity of Care Form    Patient Name: Ruddy Benz   :  1950  MRN:  [id number]    Admit date:  3/22/2023  Discharge date: 3/29/2023    Code Status Order: Full Code   Advance Directives:   Advance Care Flowsheet Documentation       Date/Time Healthcare Directive Type of Healthcare Directive Copy in [address] [employer] [address] Agent's Name Healthcare Agent's Phone Number    23 1200 No, patient does not have an advance directive for healthcare treatment -- -- -- -- --    23 0931 No, patient does not have an advance directive for healthcare treatment -- -- -- -- --            Admitting Physician:  Daysi Gregory MD  PCP: Katheirn Rodríguez MD    Discharging Nurse [address] [place] [address] Unit/Room#: 1109/1109-A  Discharging Unit Phone Number: [phone]    Emergency Contact:   Extended Emergency Contact Information  Primary Emergency Contact: Maris Muñiz  Address: [address] [place] [address] Phone: [phone]  Relation: Spouse    Past Surgical History:  Past Surgical History:   Procedure Laterality Date    ANKLE FRACTURE SURGERY Left 3/24/2023    LEFT ANKLE OPEN REDUCTION INTERNAL FIXATION performed by Amrit Hendrickson MD at [address] [place] [address]         Immunization History:   Immunization History   Administered Date(s) Administered    COVID-19, PFIZER PURPLE top, DILUTE for use, (age 15 y+), 30mcg/0.3mL 2021, 2021       Active Problems:  Patient Active Problem List   Diagnosis Code    AAA (abdominal aortic aneurysm) I71.40    Essential hypertension I10    KERI (acute kidney injury) ([facility] [address] N17.9    Fall W19. XXXA    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    Generalized weakness R53.1    Gait disturbance R26.9    Oropharyngeal dysphagia R13.12    Acute kidney injury (KERI) with acute tubular necrosis (ATN) ([facility]) N17.0    Hypokalemia E87.6    Closed fracture of one rib of left side S22.32XA    Irritable bowel

## 2023-04-03 ENCOUNTER — TELEPHONE (OUTPATIENT)
Dept: ORTHOPEDIC SURGERY | Age: 73
End: 2023-04-03

## 2023-04-03 NOTE — TELEPHONE ENCOUNTER
SNF called concerned because patient and her  were upset because they thought there was appt on 4/7/23 for PO ORIF; however, there was not an appt scheduled until SNF called to schedule on 4/11/23. This nurse explained that Dr Sayra Sharma was out of the office this week and appt could not be scheduled earlier unless they changed provider but SNF staff said patient did not want to switch. SNF is to speak with patient and her  and this nurse advised it was not a problem for the patient or her  to call the office and speak with Dr Rossi Leblanc staff. SNF state patient is not having pain in the ankle, has been up and doing well with PT/OT, and the incision was clean, intact, and dry.

## 2023-04-11 ENCOUNTER — OFFICE VISIT (OUTPATIENT)
Dept: ORTHOPEDIC SURGERY | Age: 73
End: 2023-04-11

## 2023-04-11 VITALS — OXYGEN SATURATION: 94 % | HEART RATE: 69 BPM | BODY MASS INDEX: 23.03 KG/M2 | HEIGHT: 63 IN | RESPIRATION RATE: 15 BRPM

## 2023-04-11 DIAGNOSIS — S82.842A ANKLE FRACTURE, BIMALLEOLAR, CLOSED, LEFT, INITIAL ENCOUNTER: Primary | ICD-10-CM

## 2023-04-11 PROCEDURE — 99024 POSTOP FOLLOW-UP VISIT: CPT

## 2023-04-19 ASSESSMENT — ENCOUNTER SYMPTOMS
SHORTNESS OF BREATH: 0
COUGH: 0
RHINORRHEA: 0
BACK PAIN: 0
FACIAL SWELLING: 0
NAUSEA: 0

## 2023-04-19 NOTE — PROGRESS NOTES
discharge. Left eye: No discharge. Cardiovascular:      Pulses: Normal pulses. Musculoskeletal:      Cervical back: Normal range of motion. Comments: Left ankle exam: Patient left ankle appears with lateral and medial incisions that are both appear well approximated without any sign of infection such as erythema, fluctuance, drainage, or surrounding temperature changes. Suture removal took place in the office today without complication. Patient nontender to palpation along the medial incision. There is some minor tenderness to palpation at the proximal portion of her lateral incision. Patient maintains full range of motion of the left ankle in all fields. She notes some tenderness with passive inversion and plantarflexion at the lateral malleolus. Anterior drawer sign negative. Cody Lizama' sign negative. Dorsal pedal pulse 2+, brisk capillary refill. Sensation to touch intact throughout all surfaces of the left lower extremity. Skin:     General: Skin is warm. Capillary Refill: Capillary refill takes less than 2 seconds. Coloration: Skin is not jaundiced or pale. Neurological:      General: No focal deficit present. Mental Status: She is alert and oriented to person, place, and time. Psychiatric:         Mood and Affect: Mood normal.         Behavior: Behavior normal.     Diagnostic testing:  X-rays reviewed in office, I independently reviewed the films in the office today:     Imaging results from today's visit:  Impression   Status post interval bimalleolar ORIF without complication identified. Office Procedures:  Orders Placed This Encounter   Procedures    External Referral To Physical Therapy     Referral Priority:   Routine     Referral Type:   Eval and Treat     Referral Reason:   Specialty Services Required     Requested Specialty:   Physical Therapist     Number of Visits Requested:   1       Assessment and Plan  1.   Status post ORIF    -I explained to
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
## 2023-05-23 ENCOUNTER — OFFICE VISIT (OUTPATIENT)
Dept: ORTHOPEDIC SURGERY | Age: 73
End: 2023-05-23

## 2023-05-23 VITALS
RESPIRATION RATE: 17 BRPM | HEIGHT: 63 IN | TEMPERATURE: 97.9 F | WEIGHT: 123 LBS | BODY MASS INDEX: 21.79 KG/M2 | OXYGEN SATURATION: 98 % | HEART RATE: 78 BPM

## 2023-05-23 DIAGNOSIS — Z09 POSTOP CHECK: ICD-10-CM

## 2023-05-23 DIAGNOSIS — S82.842A ANKLE FRACTURE, BIMALLEOLAR, CLOSED, LEFT, INITIAL ENCOUNTER: Primary | ICD-10-CM

## 2023-05-23 PROCEDURE — 99024 POSTOP FOLLOW-UP VISIT: CPT | Performed by: ORTHOPAEDIC SURGERY

## 2023-05-23 RX ORDER — LISINOPRIL 20 MG/1
20 TABLET ORAL DAILY
COMMUNITY

## 2023-05-23 NOTE — PATIENT INSTRUCTIONS
Continue to weight bear as tolerated  Continue range of motion  Ice and elevate as needed  Tylenol or Motrin for pain  Start physical therapy  Out patient Physical Therapy has been ordered by your provider. [facility] Physical Therapy will call you to set up therapy. If you have not heard from them within 24-48 hours of today's appointment, please reach out to them at [phone]. Follow up in 6 weeks      We are committed to providing you the best care possible. If you receive a survey after visiting one of our offices, please take time to share your experience concerning your physician office visit. These surveys are confidential and no health information about you is shared. We are eager to improve for you and we are counting on your feedback to help make that happen.

## 2023-05-23 NOTE — PROGRESS NOTES
Patient seen in office today for:  Left ankle ORIF    DOI:  3/22/23  DOS:  3/24/23  Date of last injection:  n/a     Patient reports decreased ROM, patient remains in boot. Patient reports 0/10 pain. RICE and medication are effective to alleviate pain and reduce swelling. Pain worsened by: dangling foot and ankle while sitting. Patient continues physical therapy     Xrays performed in office today.      Profession: Retired

## 2023-05-23 NOTE — PROGRESS NOTES
5/23/2023   Chief Complaint   Patient presents with    Post-Op Check     Left ankle ORIF. Dos 3/24/2023        History of Present Illness:                             Ok Resendiz is a 68 y.o. female who returns today for follow-up of her left ankle. She has been doing well with physical therapy. She has been weightbearing in her boot. She denies any issues or problems. Patient seen in office today for:  Left ankle ORIF     DOI:  3/22/23  DOS:  3/24/23  Date of last injection:  n/a      Patient reports decreased ROM, patient remains in boot. Patient reports 0/10 pain. RICE and medication are effective to alleviate pain and reduce swelling. Pain worsened by: dangling foot and ankle while sitting. Patient continues physical therapy      Xrays performed in office today. Profession: Retired       Medical History  Patient's medications, allergies, past medical, surgical, social and family histories were reviewed and updated as appropriate. Review of Systems                                            Examination:  General Exam:  Vitals: Pulse 78   Temp 97.9 °F (36.6 °C)   Resp 17   Ht 5' 3\" (1.6 m)   Wt 123 lb (55.8 kg) Comment: patient reported  SpO2 98%   BMI 21.79 kg/m²    Physical Exam         Diagnostic testing:  X-rays reviewed in office, I independently reviewed the films in the office today:     XR ANKLE LEFT (MIN 3 VIEWS)    Result Date: 5/23/2023  Views of the left ankle show excellent alignment of the left ankle status post open reduction and internal fixation. Normal alignment of the ankle mortise and syndesmosis. Excellent healing across the fracture sites of the medial lateral malleoli. Stable alignment of the hardware.  Impression: Status post ORIF left ankle        Office Procedures:  Orders Placed This Encounter   Procedures    [facility] Physical [facility]     Referral Priority:   Routine     Referral Type:   Eval and Treat     Referral Reason:

## 2023-07-11 ENCOUNTER — OFFICE VISIT (OUTPATIENT)
Dept: ORTHOPEDIC SURGERY | Age: 73
End: 2023-07-11

## 2023-07-11 VITALS — HEART RATE: 98 BPM | TEMPERATURE: 98.1 F | OXYGEN SATURATION: 93 % | RESPIRATION RATE: 16 BRPM

## 2023-07-11 DIAGNOSIS — Z09 POSTOP CHECK: Primary | ICD-10-CM

## 2023-07-11 DIAGNOSIS — S82.842A ANKLE FRACTURE, BIMALLEOLAR, CLOSED, LEFT, INITIAL ENCOUNTER: ICD-10-CM

## 2023-07-11 ASSESSMENT — ENCOUNTER SYMPTOMS
SHORTNESS OF BREATH: 0
COLOR CHANGE: 0
CHEST TIGHTNESS: 0

## 2023-07-11 NOTE — PROGRESS NOTES
fracture status post ORIF        Office Procedures:  No orders of the defined types were placed in this encounter. Assessment and Plan  1. Status post left ankle open reduction and internal fixation    Her x-rays show good healing and alignment. I have encouraged her to continue working with physical therapy and then transition to home exercise program.    She will use her ankle brace as needed for comfort and protection    Advance activities as tolerated.   Follow-up as needed          Electronically signed by Yamilex Curry MD on 7/11/2023 at 12:08 PM

## 2023-07-11 NOTE — PATIENT INSTRUCTIONS
Continue to weight bear as tolerated  Continue range of motion  Ice and elevate as needed  Tylenol or Motrin for pain  Follow up as needed. We are committed to providing you the best care possible. If you receive a survey after visiting one of our offices, please take time to share your experience concerning your physician office visit. These surveys are confidential and no health information about you is shared. We are eager to improve for you and we are counting on your feedback to help make that happen.

## 2023-12-11 ENCOUNTER — OFFICE VISIT (OUTPATIENT)
Dept: PULMONOLOGY | Age: 73
End: 2023-12-11
Payer: MEDICARE

## 2023-12-11 VITALS — OXYGEN SATURATION: 92 % | BODY MASS INDEX: 21.79 KG/M2 | WEIGHT: 123 LBS | RESPIRATION RATE: 16 BRPM | HEIGHT: 63 IN

## 2023-12-11 DIAGNOSIS — J44.9 STAGE 2 MODERATE COPD BY GOLD CLASSIFICATION (HCC): Primary | ICD-10-CM

## 2023-12-11 DIAGNOSIS — R06.02 SHORTNESS OF BREATH: ICD-10-CM

## 2023-12-11 DIAGNOSIS — R13.12 OROPHARYNGEAL DYSPHAGIA: ICD-10-CM

## 2023-12-11 DIAGNOSIS — Z87.891 FORMER SMOKER: ICD-10-CM

## 2023-12-11 DIAGNOSIS — G72.41 INCLUSION BODY MYOSITIS: ICD-10-CM

## 2023-12-11 DIAGNOSIS — J43.9 PULMONARY EMPHYSEMA, UNSPECIFIED EMPHYSEMA TYPE (HCC): ICD-10-CM

## 2023-12-11 LAB
EXPIRATORY TIME-POST: NORMAL
EXPIRATORY TIME: NORMAL
FEF 25-75% %CHNG: NORMAL
FEF 25-75% %PRED-POST: NORMAL
FEF 25-75% %PRED-PRE: NORMAL
FEF 25-75% PRED: NORMAL
FEF 25-75%-POST: NORMAL
FEF 25-75%-PRE: NORMAL
FEV1 %PRED-POST: 43.5 %
FEV1 %PRED-PRE: 39.3 %
FEV1 PRED: 2.08 L
FEV1-POST: 0.9 L
FEV1-PRE: 0.82 L
FEV1/FVC %PRED-POST: 100 %
FEV1/FVC %PRED-PRE: 96.3 %
FEV1/FVC PRED: 75.2 %
FEV1/FVC-POST: 75.2 %
FEV1/FVC-PRE: 72.4 %
FVC %PRED-POST: 43.5 L
FVC %PRED-PRE: 40.8 %
FVC PRED: 2.77 L
FVC-POST: 1.2 L
FVC-PRE: 1.13 L
PEF %PRED-POST: NORMAL
PEF %PRED-PRE: NORMAL
PEF PRED: NORMAL
PEF%CHNG: NORMAL
PEF-POST: NORMAL
PEF-PRE: NORMAL

## 2023-12-11 PROCEDURE — 1123F ACP DISCUSS/DSCN MKR DOCD: CPT | Performed by: INTERNAL MEDICINE

## 2023-12-11 PROCEDURE — 3023F SPIROM DOC REV: CPT | Performed by: INTERNAL MEDICINE

## 2023-12-11 PROCEDURE — G8427 DOCREV CUR MEDS BY ELIG CLIN: HCPCS | Performed by: INTERNAL MEDICINE

## 2023-12-11 PROCEDURE — G8400 PT W/DXA NO RESULTS DOC: HCPCS | Performed by: INTERNAL MEDICINE

## 2023-12-11 PROCEDURE — 1090F PRES/ABSN URINE INCON ASSESS: CPT | Performed by: INTERNAL MEDICINE

## 2023-12-11 PROCEDURE — 99204 OFFICE O/P NEW MOD 45 MIN: CPT | Performed by: INTERNAL MEDICINE

## 2023-12-11 PROCEDURE — 1036F TOBACCO NON-USER: CPT | Performed by: INTERNAL MEDICINE

## 2023-12-11 PROCEDURE — G8420 CALC BMI NORM PARAMETERS: HCPCS | Performed by: INTERNAL MEDICINE

## 2023-12-11 PROCEDURE — G8484 FLU IMMUNIZE NO ADMIN: HCPCS | Performed by: INTERNAL MEDICINE

## 2023-12-11 PROCEDURE — 3017F COLORECTAL CA SCREEN DOC REV: CPT | Performed by: INTERNAL MEDICINE

## 2023-12-11 RX ORDER — ALBUTEROL SULFATE 90 UG/1
2 AEROSOL, METERED RESPIRATORY (INHALATION) EVERY 4 HOURS PRN
Qty: 1 EACH | Refills: 11 | Status: SHIPPED | OUTPATIENT
Start: 2023-12-11

## 2023-12-11 ASSESSMENT — PULMONARY FUNCTION TESTS
FEV1/FVC_POST: 75.2
FEV1/FVC_PERCENT_PREDICTED_PRE: 96.3
FEV1/FVC_PREDICTED: 75.2
FVC_PRE: 1.13
FVC_PREDICTED: 2.77
FEV1_PERCENT_PREDICTED_POST: 43.5
FVC_PERCENT_PREDICTED_PRE: 40.8
FVC_POST: 1.20
FEV1/FVC_PRE: 72.4
FEV1/FVC_PERCENT_PREDICTED_POST: 100.0
FEV1_PERCENT_PREDICTED_PRE: 39.3
FEV1_PREDICTED: 2.08
FEV1_POST: 0.90
FEV1_PRE: 0.82
FVC_PERCENT_PREDICTED_POST: 43.5

## 2023-12-11 NOTE — PROGRESS NOTES
Subjective:   CHIEF COMPLAINT / HPI: Moderate COPD, shortness of breath, inclusion body myositis with muscle weakness, oropharyngeal dysphagia  Per Ribera is a 77-year-old female self-referred for evaluation of mildly increasing shortness of breath. She states that about 3 years ago she was diagnosed with inclusion body myositis and has been followed by neurology in [place]. Also in the interim she has been diagnosed with oropharyngeal dysphagia and was scheduled for esophageal dilation but this was later canceled. Typically she does not choke on food but every once in a while she will have some difficulty swallowing foods like bread and meats. She does not give a history of aspiration pneumonia or recurrent bronchitis. She mentions that over the past several years she has been noticing mildly increasing dyspnea on exertion but she does have significant problems ambulating because of her muscle weakness and has had multiple falls. She is now using a walker for ambulation. 10 years ago she was diagnosed with moderate COPD but she does not recall being told that she had COPD. It sounds like she was on inhaled medications including Advair and Symbicort but she only took them for short while. As mentioned she states that she rarely gets episodes of bronchitis and she denies persistent cough or sputum expectoration. She denies a history of asthma as a child or adolescent. She was a pack-a-day smoker for almost 40 years but quit 12 years ago. Office spirometry demonstrates an FEV1 of 0.82 L with an FVC of 1.13 L. This is consistent with a mild to moderate obstructive defect. There is a significant response to bronchodilators.   Because her FEV1 and FVC are both significantly reduced I cannot rule out a restrictive lung process without further testing  Past Medical History:  Past Medical History:   Diagnosis Date    Abdominal aortic aneurysm (AAA) >39 mm diameter [facility])     Former smoker

## 2023-12-13 ENCOUNTER — TELEPHONE (OUTPATIENT)
Dept: GASTROENTEROLOGY | Age: 73
End: 2023-12-13

## 2023-12-13 NOTE — TELEPHONE ENCOUNTER
Called pt. In regards to a referral for dysphagia. Made appt for pt to see.  Dr. Augusta Garzon on 1/10/24 @2pm

## 2023-12-14 ENCOUNTER — HOSPITAL ENCOUNTER (OUTPATIENT)
Age: 73
Discharge: HOME | End: 2023-12-14
Payer: MEDICARE

## 2023-12-14 ENCOUNTER — HOSPITAL ENCOUNTER (OUTPATIENT)
Dept: GENERAL RADIOLOGY | Age: 73
Discharge: HOME | End: 2023-12-14
Payer: MEDICARE

## 2023-12-14 DIAGNOSIS — R13.12 OROPHARYNGEAL DYSPHAGIA: ICD-10-CM

## 2023-12-14 DIAGNOSIS — J44.9 STAGE 2 MODERATE COPD BY GOLD CLASSIFICATION (HCC): ICD-10-CM

## 2023-12-14 DIAGNOSIS — R06.02 SHORTNESS OF BREATH: ICD-10-CM

## 2023-12-14 DIAGNOSIS — J43.9 PULMONARY EMPHYSEMA, UNSPECIFIED EMPHYSEMA TYPE (HCC): ICD-10-CM

## 2023-12-14 DIAGNOSIS — Z87.891 FORMER SMOKER: ICD-10-CM

## 2023-12-14 DIAGNOSIS — G72.41 INCLUSION BODY MYOSITIS: ICD-10-CM

## 2023-12-14 PROCEDURE — 71046 X-RAY EXAM CHEST 2 VIEWS: CPT

## 2023-12-15 DIAGNOSIS — R13.12 OROPHARYNGEAL DYSPHAGIA: ICD-10-CM

## 2023-12-15 DIAGNOSIS — Z87.891 FORMER SMOKER: ICD-10-CM

## 2023-12-15 DIAGNOSIS — J44.9 STAGE 2 MODERATE COPD BY GOLD CLASSIFICATION (HCC): ICD-10-CM

## 2023-12-15 DIAGNOSIS — J43.9 PULMONARY EMPHYSEMA, UNSPECIFIED EMPHYSEMA TYPE (HCC): ICD-10-CM

## 2023-12-15 DIAGNOSIS — G72.41 INCLUSION BODY MYOSITIS: ICD-10-CM

## 2023-12-15 DIAGNOSIS — R06.02 SHORTNESS OF BREATH: ICD-10-CM

## 2024-01-10 ENCOUNTER — OFFICE VISIT (OUTPATIENT)
Dept: GASTROENTEROLOGY | Age: 74
End: 2024-01-10
Payer: MEDICARE

## 2024-01-10 VITALS
BODY MASS INDEX: 22.75 KG/M2 | HEIGHT: 63 IN | WEIGHT: 128.4 LBS | SYSTOLIC BLOOD PRESSURE: 124 MMHG | OXYGEN SATURATION: 94 % | DIASTOLIC BLOOD PRESSURE: 76 MMHG | TEMPERATURE: 97.2 F | HEART RATE: 68 BPM

## 2024-01-10 DIAGNOSIS — K58.0 IRRITABLE BOWEL SYNDROME WITH DIARRHEA: ICD-10-CM

## 2024-01-10 DIAGNOSIS — R13.19 ESOPHAGEAL DYSPHAGIA: Primary | ICD-10-CM

## 2024-01-10 LAB
ARTIFACT EVENTS (PULSE): NORMAL
ARTIFACT EVENTS: NORMAL
AVERAGE PULSE: NORMAL
AWAKE SPO2: NORMAL
BASAL SPO2: NORMAL
BRADYCARDIA TIME: NORMAL
DELTA SPO2: NORMAL
HIGH PULSE: NORMAL
HIGH SPO2: NORMAL
LOW PULSE: NORMAL
LOW SPO2: NORMAL
OXYGEN DESATURATION EVENTS (3%): NORMAL
OXYGEN DESATURATION INDEX (ODI): NORMAL
PERCENT TIME IN BRADYCARDIA: NORMAL
PERCENT TIME IN TACHYCARDIA: NORMAL
TACHYCARDIA TIME: NORMAL
TIME <= 88%: NORMAL MIN
TIME <= 89%: NORMAL
TIME CONSECUTIVE <= 88%: NORMAL

## 2024-01-10 PROCEDURE — 99204 OFFICE O/P NEW MOD 45 MIN: CPT | Performed by: INTERNAL MEDICINE

## 2024-01-10 PROCEDURE — 3078F DIAST BP <80 MM HG: CPT | Performed by: INTERNAL MEDICINE

## 2024-01-10 PROCEDURE — 1123F ACP DISCUSS/DSCN MKR DOCD: CPT | Performed by: INTERNAL MEDICINE

## 2024-01-10 PROCEDURE — G8400 PT W/DXA NO RESULTS DOC: HCPCS | Performed by: INTERNAL MEDICINE

## 2024-01-10 PROCEDURE — G8484 FLU IMMUNIZE NO ADMIN: HCPCS | Performed by: INTERNAL MEDICINE

## 2024-01-10 PROCEDURE — 3074F SYST BP LT 130 MM HG: CPT | Performed by: INTERNAL MEDICINE

## 2024-01-10 PROCEDURE — 3017F COLORECTAL CA SCREEN DOC REV: CPT | Performed by: INTERNAL MEDICINE

## 2024-01-10 PROCEDURE — G8420 CALC BMI NORM PARAMETERS: HCPCS | Performed by: INTERNAL MEDICINE

## 2024-01-10 PROCEDURE — G8427 DOCREV CUR MEDS BY ELIG CLIN: HCPCS | Performed by: INTERNAL MEDICINE

## 2024-01-10 PROCEDURE — 1090F PRES/ABSN URINE INCON ASSESS: CPT | Performed by: INTERNAL MEDICINE

## 2024-01-10 PROCEDURE — 1036F TOBACCO NON-USER: CPT | Performed by: INTERNAL MEDICINE

## 2024-01-10 RX ORDER — OMEPRAZOLE 40 MG/1
40 CAPSULE, DELAYED RELEASE ORAL
Qty: 90 CAPSULE | Refills: 0 | Status: SHIPPED | OUTPATIENT
Start: 2024-01-10

## 2024-01-10 RX ORDER — DILTIAZEM HYDROCHLORIDE 120 MG/1
CAPSULE, EXTENDED RELEASE ORAL
COMMUNITY
Start: 2023-12-29

## 2024-01-10 RX ORDER — LISINOPRIL 20 MG/1
1 TABLET ORAL DAILY
COMMUNITY

## 2024-01-10 NOTE — PROGRESS NOTES
[facility] Gastroenterology and Hepatology             MD Betty Bauman MD Carol Christensen, APRN-CNP       Beatriz Falcon, APRN-CNP             [address] [place] [address] [phone] fax [phone]        Gastroenterology Clinic Consultation    Betty Augustine MD  Encounter Date: 01/10/24     CC: Dysphagia       Issa Calloway MD  [address]  [place] [address]     History obtained from: patient, medical records     Subjective:       Kanika Patterson is an 73 y.o. female with past medical history of AAA, hypertension, IBS, former smoker, inclusion body myositis who presents for Dysphagia    Patient has been diagnosed with inclusion body myositis and is being currently followed by neurology at [facility].  She is also complaining of difficulty swallowing.  She did have a modified barium swallow that was done in December 2022 with residue in the lower pharynx and upper esophagus with narrowing of the upper esophagus.  On review of records she has seen Dr. Moser in the past and had history of EGD with dilation by him.  She was also evaluated by Dr. Priscilla Dawn at [facility] and did have an EGD with them in March 2021 with biopsies noted to be negative for celiac, eosinophilic esophagitis.  It is unclear if dilation was done at that point. She was scheduled with them for an EGD in 01/2023 but her case was cancelled.     Dysphagia is intermittent, upper esophagus and liquids. She used to choke but now does not do that. She also denies any nasal regurgitant.   She denies any GERD, heart burn, pyrosis, abdominal pain. +I've diarrhea has been diagnosed with IBS treated with imodium prn. No melena or hematochezia.      Patient Active Problem List   Diagnosis    AAA (abdominal aortic aneurysm) (HCC)    Essential hypertension    KERI (acute kidney injury) (HCC)    Inclusion body myositis    Generalized

## 2024-01-15 ENCOUNTER — TELEPHONE (OUTPATIENT)
Dept: PULMONOLOGY | Age: 74
End: 2024-01-15

## 2024-01-15 DIAGNOSIS — J44.9 STAGE 2 MODERATE COPD BY GOLD CLASSIFICATION (HCC): Primary | ICD-10-CM

## 2024-01-15 DIAGNOSIS — J44.9 STAGE 2 MODERATE COPD BY GOLD CLASSIFICATION (HCC): ICD-10-CM

## 2024-01-15 DIAGNOSIS — J43.9 PULMONARY EMPHYSEMA, UNSPECIFIED EMPHYSEMA TYPE (HCC): ICD-10-CM

## 2024-01-15 DIAGNOSIS — R06.02 SHORTNESS OF BREATH: ICD-10-CM

## 2024-01-15 DIAGNOSIS — R13.12 OROPHARYNGEAL DYSPHAGIA: ICD-10-CM

## 2024-01-15 DIAGNOSIS — G72.41 INCLUSION BODY MYOSITIS: ICD-10-CM

## 2024-01-15 DIAGNOSIS — Z87.891 FORMER SMOKER: ICD-10-CM

## 2024-01-15 DIAGNOSIS — G47.34 NOCTURNAL HYPOXEMIA: ICD-10-CM

## 2024-01-15 NOTE — TELEPHONE ENCOUNTER
I did talk to Mrs. Patterson over the phone and reviewed her nighttime oxygen saturation study.  She demonstrated O2 saturation less than 88% for 39 minutes and 31 seconds.  Because that she would benefit from the use of nocturnal oxygen.  Orders will sent to [facility]

## 2024-02-20 ENCOUNTER — HOSPITAL ENCOUNTER (OUTPATIENT)
Dept: PULMONOLOGY | Age: 74
End: 2024-02-20
Payer: MEDICARE

## 2024-02-20 ENCOUNTER — HOSPITAL ENCOUNTER (OUTPATIENT)
Dept: PULMONOLOGY | Age: 74
Discharge: HOME | End: 2024-02-20
Payer: MEDICARE

## 2024-02-20 DIAGNOSIS — G72.41 INCLUSION BODY MYOSITIS: ICD-10-CM

## 2024-02-20 DIAGNOSIS — R13.12 OROPHARYNGEAL DYSPHAGIA: ICD-10-CM

## 2024-02-20 DIAGNOSIS — J43.9 PULMONARY EMPHYSEMA, UNSPECIFIED EMPHYSEMA TYPE (HCC): ICD-10-CM

## 2024-02-20 DIAGNOSIS — R06.02 SHORTNESS OF BREATH: ICD-10-CM

## 2024-02-20 DIAGNOSIS — J44.9 STAGE 2 MODERATE COPD BY GOLD CLASSIFICATION (HCC): ICD-10-CM

## 2024-02-20 DIAGNOSIS — Z87.891 FORMER SMOKER: ICD-10-CM

## 2024-02-20 LAB
DISTANCE WALKED: 475 FT
DLCO %PRED: 68 %
DLCO PRED: NORMAL
DLCO/VA %PRED: NORMAL
DLCO/VA PRED: NORMAL
DLCO/VA: NORMAL
DLCO: NORMAL
EXPIRATORY TIME-POST: NORMAL
EXPIRATORY TIME: NORMAL
FEF 25-75 %CHNG: NORMAL
FEF 25-75 POST %PRED: NORMAL
FEF 25-75% %PRED-PRE: NORMAL
FEF 25-75% PRED: NORMAL
FEF 25-75-POST: NORMAL
FEF 25-75-PRE: NORMAL
FEV1 %PRED-POST: 65 %
FEV1 %PRED-PRE: 60 %
FEV1 PRED: NORMAL
FEV1-POST: NORMAL
FEV1-PRE: NORMAL
FEV1/FVC %PRED-POST: 90 %
FEV1/FVC %PRED-PRE: 86 %
FEV1/FVC PRED: NORMAL
FEV1/FVC-POST: NORMAL
FEV1/FVC-PRE: NORMAL
FVC %PRED-POST: 71 L
FVC %PRED-PRE: 69 %
FVC PRED: NORMAL
FVC-POST: NORMAL
FVC-PRE: NORMAL
GAW %PRED: NORMAL
GAW PRED: NORMAL
GAW: NORMAL
IC PRE %PRED: NORMAL
IC PRED: NORMAL
IC: NORMAL
MEP: NORMAL
MIP: NORMAL
MVV %PRED-PRE: NORMAL
MVV PRED: NORMAL
MVV-PRE: NORMAL
PEF %PRED-POST: NORMAL
PEF %PRED-PRE: NORMAL
PEF PRED: NORMAL
PEF%CHNG: NORMAL
PEF-POST: NORMAL
PEF-PRE: NORMAL
RAW %PRED: NORMAL
RAW PRED: NORMAL
RAW: NORMAL
RV PRE %PRED: NORMAL
RV PRED: NORMAL
RV: NORMAL
SPO2: 93 %
SVC %PRED: NORMAL
SVC PRED: NORMAL
SVC: NORMAL
TLC PRE %PRED: 96 %
TLC PRED: NORMAL
TLC: NORMAL
VA %PRED: NORMAL
VA PRED: NORMAL
VA: NORMAL
VTG %PRED: NORMAL
VTG PRED: NORMAL
VTG: NORMAL

## 2024-02-20 PROCEDURE — 94727 GAS DIL/WSHOT DETER LNG VOL: CPT

## 2024-02-20 PROCEDURE — 94060 EVALUATION OF WHEEZING: CPT

## 2024-02-20 PROCEDURE — 94726 PLETHYSMOGRAPHY LUNG VOLUMES: CPT

## 2024-02-20 PROCEDURE — 94618 PULMONARY STRESS TESTING: CPT

## 2024-02-20 RX ORDER — BUDESONIDE, GLYCOPYRROLATE, AND FORMOTEROL FUMARATE 160; 9; 4.8 UG/1; UG/1; UG/1
AEROSOL, METERED RESPIRATORY (INHALATION)
COMMUNITY

## 2024-02-20 ASSESSMENT — PULMONARY FUNCTION TESTS
FVC_PERCENT_PREDICTED_PRE: 69
FEV1_PERCENT_PREDICTED_PRE: 60
FEV1/FVC_PERCENT_PREDICTED_POST: 90
FEV1_PERCENT_PREDICTED_POST: 65
FVC_PERCENT_PREDICTED_POST: 71
FEV1/FVC_PERCENT_PREDICTED_PRE: 86

## 2024-02-20 NOTE — PROGRESS NOTES
[facility] Pulmonary Function Lab - Six Minute Walk  Test Performed on: Room Air__X___ Oxygen at _____ lpm via N/C  Assist Device Used During Test:    None____ Cane____ Walker__X_   Shortness of Breath - Lev's Scale  0 Nothing at all 5 Severe    0.5 Very very slight 6    1 Very slight 7 Very severe   2 Slight 8     3 Moderate 9 Very very severe   4 Somewhat severe 10 Maximal      Time SpO2 Heart Rate Respiratory Rate Modified Lev’s Scale Other      Baseline   93              %  75 20                1 minute               96              % 89   1           2 minutes         95              %  89  2           3 minutes          95              %  88     2           4 minutes        94              %  90      2           5 minutes   95               %  90      2           6 minutes      95               %  90     2        Recovery   x 1 Min           96               %  83 26 1        Recovery   x 2 Min              96             %  83                    Number of Laps_2_ X 170 feet _340_+ _135_ additional feet = Total _475_feet  Stopped or paused before 6 minutes? No_X___ Yes _____   Pre Blood Pressure: __133 / _71__    Post Blood Pressure:_143  _/_78__  Interpretation:    
Issa GARCIA MD  NPI:  [id number] ___    [] Patient Qualifies      [] Patient Does NOT qualify

## 2024-02-20 NOTE — PROGRESS NOTES
Patient will be called with an arrival time on 2/26/2024 for her procedure at [facility] on 2/27/2024.    NOTHING TO EAT OR DRINK AFTER MIDNIGHT DAY OF SURGERY    1. Enter thru the hospital main entrance on day of surgery, check in at the Information Desk. If you arrive prior to 6:00am, enter thru the ER entrance.    2. Follow the directions as prescribed by the doctor for your procedure and medications.         Morning of surgery take:prilosec.         Stop vitamins, supplements and NSAIDS:      3. Check with your Doctor regarding stopping blood thinners and follow their instructions.    4. Do not smoke, vape or use chewing tobacco morning of surgery. Do not drink any alcoholic beverages 24 hours prior to surgery.       This includes NA Beer. No street drugs 7 days prior to surgery.    5. If you have dentures, contacts of glasses they will be removed before going to the OR; please bring a case.    6. Please bring picture ID, insurance card, paperwork from the doctor’s office (H & P, Consent, & card for implantable devices).    7. Take a shower with an antibacterial soap the night before surgery and the morning of surgery. Do not put anything on your skin      After your morning shower.    8. You will need a responsible adult to drive you home and check on you after surgery.

## 2024-02-22 NOTE — RESULT ENCOUNTER NOTE
I spoke to Mrs. Patterson over the phone and discussed the results of her pulmonary function test and her 6-minute walk test.  We did have a PFT from 2012 for comparison and she does remain in stage II/moderate COPD but there has been a decrease in her flow rates and in her lung volumes and a mild decrease in her diffusion capacity.  I think some of this may be due to her muscle weakness from her inclusion body myositis.  I also reviewed her 6-minute walk test and she actually did very good with this with just a mild decrease in walking distance and no evidence of oxygen desaturation.  She did qualify for oxygen at nighttime and I will arrange that in the near future.   25-May-2023 15:45

## 2024-02-26 ENCOUNTER — ANESTHESIA EVENT (OUTPATIENT)
Dept: ENDOSCOPY | Age: 74
End: 2024-02-26
Payer: MEDICARE

## 2024-02-26 ASSESSMENT — ENCOUNTER SYMPTOMS: SHORTNESS OF BREATH: 1

## 2024-02-26 NOTE — ANESTHESIA PRE PROCEDURE
Department of Anesthesiology  Preprocedure Note       Name:  Kanika Patterson   Age:  73 y.o.  :  1950                                          MRN:  [id number]         Date:  2024      Surgeon: Surgeon(s):  Betty Augustine MD    Procedure: Procedure(s):  ESOPHAGOGASTRODUODENOSCOPY DILATION BALLOON    Medications prior to admission:   Prior to Admission medications    Medication Sig Start Date End Date Taking? Authorizing Provider   Budeson-Glycopyrrol-Formoterol (BREZTRI AEROSPHERE) 160-9-4.8 MCG/ACT AERO Inhale into the lungs   Yes Tomasz Gould MD   vitamin D (CHOLECALCIFEROL) 50 MCG ( UT) CAPS capsule Take 1 capsule every day by oral route. 3/28/19   Tomasz Gould MD   dilTIAZem (TIAZAC) 120 MG extended release capsule  23   Tomasz Gould MD   lisinopril (PRINIVIL;ZESTRIL) 20 MG tablet Take 1 tablet by mouth daily    Tomasz Gould MD   omeprazole (PRILOSEC) 40 MG delayed release capsule Take 1 capsule by mouth every morning (before breakfast) 1/10/24   Betty Augustine MD   albuterol sulfate HFA (PROVENTIL;VENTOLIN;PROAIR) 108 (90 Base) MCG/ACT inhaler Inhale 2 puffs into the lungs every 4 hours as needed for Wheezing  Patient not taking: Reported on 23   Issa Calloway MD       Current medications:    No current facility-administered medications for this encounter.     Current Outpatient Medications   Medication Sig Dispense Refill   • Budeson-Glycopyrrol-Formoterol (BREZTRI AEROSPHERE) 160-9-4.8 MCG/ACT AERO Inhale into the lungs     • vitamin D (CHOLECALCIFEROL) 50 MCG ( UT) CAPS capsule Take 1 capsule every day by oral route.     • dilTIAZem (TIAZAC) 120 MG extended release capsule      • lisinopril (PRINIVIL;ZESTRIL) 20 MG tablet Take 1 tablet by mouth daily     • omeprazole (PRILOSEC) 40 MG delayed release capsule Take 1 capsule by mouth every morning (before breakfast) 90 capsule 0   • albuterol sulfate HFA (PROVENTIL;VENTOLIN;PROAIR)

## 2024-02-26 NOTE — PROGRESS NOTES
Spoke with patient and she will arrive at 1215 at [facility] on 2/27/2024 for her procedure at 1345. IV order is in [facility].

## 2024-02-27 ENCOUNTER — ANESTHESIA (OUTPATIENT)
Dept: ENDOSCOPY | Age: 74
End: 2024-02-27
Payer: MEDICARE

## 2024-02-27 ENCOUNTER — HOSPITAL ENCOUNTER (OUTPATIENT)
Age: 74
Setting detail: OUTPATIENT SURGERY
Discharge: HOME | End: 2024-02-27
Attending: INTERNAL MEDICINE | Admitting: INTERNAL MEDICINE
Payer: MEDICARE

## 2024-02-27 VITALS
WEIGHT: 128 LBS | HEIGHT: 64 IN | DIASTOLIC BLOOD PRESSURE: 75 MMHG | SYSTOLIC BLOOD PRESSURE: 168 MMHG | OXYGEN SATURATION: 93 % | RESPIRATION RATE: 16 BRPM | BODY MASS INDEX: 21.85 KG/M2 | HEART RATE: 61 BPM | TEMPERATURE: 97.2 F

## 2024-02-27 DIAGNOSIS — R13.19 ESOPHAGEAL DYSPHAGIA: ICD-10-CM

## 2024-02-27 PROCEDURE — 2580000003 HC RX 258: Performed by: ANESTHESIOLOGY

## 2024-02-27 PROCEDURE — C1726 CATH, BAL DIL, NON-VASCULAR: HCPCS | Performed by: INTERNAL MEDICINE

## 2024-02-27 PROCEDURE — 7100000011 HC PHASE II RECOVERY - ADDTL 15 MIN: Performed by: INTERNAL MEDICINE

## 2024-02-27 PROCEDURE — 7100000010 HC PHASE II RECOVERY - FIRST 15 MIN: Performed by: INTERNAL MEDICINE

## 2024-02-27 PROCEDURE — 2500000003 HC RX 250 WO HCPCS: Performed by: NURSE ANESTHETIST, CERTIFIED REGISTERED

## 2024-02-27 PROCEDURE — 2709999900 HC NON-CHARGEABLE SUPPLY: Performed by: INTERNAL MEDICINE

## 2024-02-27 PROCEDURE — 88305 TISSUE EXAM BY PATHOLOGIST: CPT | Performed by: PATHOLOGY

## 2024-02-27 PROCEDURE — 6360000002 HC RX W HCPCS: Performed by: NURSE ANESTHETIST, CERTIFIED REGISTERED

## 2024-02-27 PROCEDURE — 88342 IMHCHEM/IMCYTCHM 1ST ANTB: CPT | Performed by: PATHOLOGY

## 2024-02-27 PROCEDURE — 3700000000 HC ANESTHESIA ATTENDED CARE: Performed by: INTERNAL MEDICINE

## 2024-02-27 PROCEDURE — 3609017700 HC EGD DILATION GASTRIC/DUODENAL STRICTURE: Performed by: INTERNAL MEDICINE

## 2024-02-27 PROCEDURE — 3700000001 HC ADD 15 MINUTES (ANESTHESIA): Performed by: INTERNAL MEDICINE

## 2024-02-27 RX ORDER — SODIUM CHLORIDE, SODIUM LACTATE, POTASSIUM CHLORIDE, CALCIUM CHLORIDE 600; 310; 30; 20 MG/100ML; MG/100ML; MG/100ML; MG/100ML
INJECTION, SOLUTION INTRAVENOUS CONTINUOUS
Status: DISCONTINUED | OUTPATIENT
Start: 2024-02-27 | End: 2024-02-27 | Stop reason: HOSPADM

## 2024-02-27 RX ORDER — LIDOCAINE HYDROCHLORIDE 20 MG/ML
INJECTION, SOLUTION INFILTRATION; PERINEURAL PRN
Status: DISCONTINUED | OUTPATIENT
Start: 2024-02-27 | End: 2024-02-27 | Stop reason: SDUPTHER

## 2024-02-27 RX ORDER — PROPOFOL 10 MG/ML
INJECTION, EMULSION INTRAVENOUS PRN
Status: DISCONTINUED | OUTPATIENT
Start: 2024-02-27 | End: 2024-02-27 | Stop reason: SDUPTHER

## 2024-02-27 RX ADMIN — PROPOFOL 50 MG: 10 INJECTION, EMULSION INTRAVENOUS at 14:57

## 2024-02-27 RX ADMIN — LIDOCAINE HYDROCHLORIDE 100 MG: 20 INJECTION, SOLUTION INFILTRATION; PERINEURAL at 14:49

## 2024-02-27 RX ADMIN — PROPOFOL 50 MG: 10 INJECTION, EMULSION INTRAVENOUS at 14:53

## 2024-02-27 RX ADMIN — PROPOFOL 100 MG: 10 INJECTION, EMULSION INTRAVENOUS at 14:49

## 2024-02-27 RX ADMIN — SODIUM CHLORIDE, POTASSIUM CHLORIDE, SODIUM LACTATE AND CALCIUM CHLORIDE: 600; 310; 30; 20 INJECTION, SOLUTION INTRAVENOUS at 12:38

## 2024-02-27 RX ADMIN — PROPOFOL 50 MG: 10 INJECTION, EMULSION INTRAVENOUS at 15:01

## 2024-02-27 RX ADMIN — PROPOFOL 20 MG: 10 INJECTION, EMULSION INTRAVENOUS at 15:04

## 2024-02-27 ASSESSMENT — COPD QUESTIONNAIRES: CAT_SEVERITY: MODERATE

## 2024-02-27 ASSESSMENT — PAIN - FUNCTIONAL ASSESSMENT: PAIN_FUNCTIONAL_ASSESSMENT: 0-10

## 2024-02-27 ASSESSMENT — PAIN SCALES - GENERAL: PAINLEVEL_OUTOF10: 0

## 2024-02-27 NOTE — PROGRESS NOTES
Pt returned to room from endo    Report received from Anika  Pt A&O, call light placed within reach, side rails up x's 2, pt given beverage of choice  1550 Dr Augustine in room speaking to pt and spouse  1555 Discharge instructions given to pt and spouse,both voiced understanding  1600 Pt escorted to main entrance via wheelchair for discharge.

## 2024-02-27 NOTE — H&P
ENDOSCOPY   Pre-operative History and Physical    Patient: Kanika Patterson  : 1950      History Obtained From:  patient, electronic medical record        HISTORY OF PRESENT ILLNESS:                The patient is a 73 y.o. female with significant past medical history as below who presents for EGD with dilation    Indication Dysphagia.     Past Medical History:        Diagnosis Date    Abdominal aortic aneurysm (AAA) >39 mm diameter ([facility])     Former smoker 2023    Hypertension     IBS (irritable bowel syndrome)     Nocturnal hypoxemia 01/15/2024    Shortness of breath 2023    Stage 2 moderate COPD by GOLD classification ([facility]) 2023       Past Surgical History:        Procedure Laterality Date    ANKLE FRACTURE SURGERY Left 2023    LEFT ANKLE OPEN REDUCTION INTERNAL FIXATION performed by Maurizio Cooper MD at [facility] OR    BLADDER SURGERY      COLONOSCOPY      ENDOSCOPY, COLON, DIAGNOSTIC      TONSILLECTOMY           Current Medications:    Medications    Prior to Admission medications    Medication Sig Start Date End Date Taking? Authorizing Provider   Budeson-Glycopyrrol-Formoterol (BREZTRI AEROSPHERE) 160-9-4.8 MCG/ACT AERO Inhale into the lungs   Yes Tomasz Gould MD   vitamin D (CHOLECALCIFEROL) 50 MCG (2000) CAPS capsule Take 1 capsule every day by oral route. 3/28/19   Tomasz Gould MD   dilTIAZem (TIAZAC) 120 MG extended release capsule  23   Tomasz Gould MD   lisinopril (PRINIVIL;ZESTRIL) 20 MG tablet Take 1 tablet by mouth daily    Tomasz Gould MD   omeprazole (PRILOSEC) 40 MG delayed release capsule Take 1 capsule by mouth every morning (before breakfast) 1/10/24   Betty Augustine MD   albuterol sulfate HFA (PROVENTIL;VENTOLIN;PROAIR) 108 (90 Base) MCG/ACT inhaler Inhale 2 puffs into the lungs every 4 hours as needed for Wheezing  Patient not taking: Reported on 23   Issa Calloway MD      Scheduled Medications:

## 2024-02-27 NOTE — DISCHARGE INSTRUCTIONS
[facility]  [phone]    Do not drive, work around machines or use equipment.  Do not drink any alcoholic beverages.  Do not smoke while alone.  Avoid making important decisions.  Plan to spend a quiet, relaxed evening @ home.  Resume normal activities as you begin to feel better.  Eat lightly for your first meal, then gradually increase your diet to what is normal for you.  In case of nausea, avoid food and drink only clear liquids.  Resume food as nausea ceases.  Notify your surgeon if you experience fever, chills, large amount of bleeding, difficulty breathing, persistent nausea and vomiting or any other disturbing problem.  Call for a follow-up appointment with your surgeon.

## 2024-02-27 NOTE — ANESTHESIA POSTPROCEDURE EVALUATION
Department of Anesthesiology  Postprocedure Note    Patient: Kanika Patterson  MRN: [id number]  YOB: 1950  Date of evaluation: 2/27/2024    Procedure Summary       Date: 02/27/24 Room / Location: [place] [address] / [facility]    Anesthesia Start: 1430 Anesthesia Stop: 1508    Procedure: ESOPHAGOGASTRODUODENOSCOPY DILATION BALLOON FROM 10MM UP TO 12MM  WITH DILATION IN THE PYLORIS TO 10.5MM, MID AND UPPER ESOPHAGUS TO 12MM AND LOWER ESOPHAGUS TO 12MM WITH BIOPSIES TAKEN FOR GASTRITIS Diagnosis:       Esophageal dysphagia      (Esophageal dysphagia [R13.19])    Surgeons: Betty Augustine MD Responsible Provider: Rommel Grubbs APRN - CRNA    Anesthesia Type: MAC ASA Status: 3            Anesthesia Type: No value filed.    Kayce Phase I: Kayce Score: 10    Kayce Phase II:      Anesthesia Post Evaluation    Patient location during evaluation: bedside  Patient participation: complete - patient participated  Level of consciousness: awake  Pain score: 0  Airway patency: patent  Nausea & Vomiting: no vomiting and no nausea  Cardiovascular status: blood pressure returned to baseline and hemodynamically stable  Respiratory status: acceptable and room air  Hydration status: euvolemic  Pain management: adequate    No notable events documented.

## 2024-03-15 NOTE — PROGRESS NOTES
3/15/24 - .LM with my call-back # concerning  surgery @ [facility] on  3/21/24.  Please call the PAT Nurse for a phone assessment and surgery instructions.

## 2024-03-15 NOTE — PROGRESS NOTES
.Surgery @ [facility] on 3/21/24 at 0845, arrival 0715    NOTHING TO EAT OR DRINK AFTER MIDNIGHT DAY OF SURGERY    1. Enter thru the hospital main entrance on day of surgery, check in at the Information Desk. If you arrive prior to 6:00am, enter thru the ER entrance.    2. Follow the directions as prescribed by the doctor for your procedure and medications.         Morning of surgery take:  Diltiazem & Prilosec with sips of water         Stop vitamins, supplements and NSAIDS:  3/15/24    3. Check with your Doctor regarding stopping blood thinners and follow their instructions.    4. Do not smoke, vape or use chewing tobacco morning of surgery. Do not drink any alcoholic beverages 24 hours prior to surgery.       This includes NA Beer. No street drugs 7 days prior to surgery.    5. If you have dentures, contacts of glasses they will be removed before going to the OR; please bring a case.    6. Please bring picture ID, insurance card, paperwork from the doctor’s office (H & P, Consent, & card for implantable devices).    7. Take a shower with an antibacterial soap the night before surgery and the morning of surgery. Do not put anything on your skin      After your morning shower.    8. You will need a responsible adult to drive you home and check on you after surgery.

## 2024-03-19 ENCOUNTER — TELEPHONE (OUTPATIENT)
Dept: GASTROENTEROLOGY | Age: 74
End: 2024-03-19

## 2024-03-19 NOTE — TELEPHONE ENCOUNTER
Called and spoke to the patient. I informed her that Dr. Augustine's baby was born this morning and that Dr. Augustine will be out this week, BUT Dr. Alfaro is willing to do Dr. Augustine's procedures. Patient verbalized understanding and is okay with Dr. Alfaro doing the procedure.

## 2024-03-20 ENCOUNTER — ANESTHESIA EVENT (OUTPATIENT)
Dept: ENDOSCOPY | Age: 74
End: 2024-03-20
Payer: MEDICARE

## 2024-03-20 ASSESSMENT — ENCOUNTER SYMPTOMS: SHORTNESS OF BREATH: 1

## 2024-03-20 ASSESSMENT — COPD QUESTIONNAIRES: CAT_SEVERITY: MODERATE

## 2024-03-20 NOTE — ANESTHESIA PRE PROCEDURE
Department of Anesthesiology  Preprocedure Note       Name:  Kanika Patterson   Age:  74 y.o.  :  1950                                          MRN:  [id number]         Date:  3/20/2024      Surgeon: Surgeon(s):  Jame Alfaro MD    Procedure: Procedure(s):  ESOPHAGOGASTRODUODENOSCOPY DILATION BALLOON    Medications prior to admission:   Prior to Admission medications    Medication Sig Start Date End Date Taking? Authorizing Provider   Budeson-Glycopyrrol-Formoterol (BREZTRI AEROSPHERE) 160-9-4.8 MCG/ACT AERO Inhale into the lungs    Tomasz Gould MD   vitamin D (CHOLECALCIFEROL) 50 MCG (2000) CAPS capsule Take 1 capsule every day by oral route. 3/28/19   Tomasz Gould MD   dilTIAZem (TIAZAC) 120 MG extended release capsule  23   Tomasz Gould MD   lisinopril (PRINIVIL;ZESTRIL) 20 MG tablet Take 1 tablet by mouth daily    Tomasz Gould MD   omeprazole (PRILOSEC) 40 MG delayed release capsule Take 1 capsule by mouth every morning (before breakfast) 1/10/24   Betty Augustine MD   albuterol sulfate HFA (PROVENTIL;VENTOLIN;PROAIR) 108 (90 Base) MCG/ACT inhaler Inhale 2 puffs into the lungs every 4 hours as needed for Wheezing 23   Issa Calloway MD       Current medications:    No current facility-administered medications for this encounter.     Current Outpatient Medications   Medication Sig Dispense Refill   • Budeson-Glycopyrrol-Formoterol (BREZTRI AEROSPHERE) 160-9-4.8 MCG/ACT AERO Inhale into the lungs     • vitamin D (CHOLECALCIFEROL) 50 MCG (2000) CAPS capsule Take 1 capsule every day by oral route.     • dilTIAZem (TIAZAC) 120 MG extended release capsule      • lisinopril (PRINIVIL;ZESTRIL) 20 MG tablet Take 1 tablet by mouth daily     • omeprazole (PRILOSEC) 40 MG delayed release capsule Take 1 capsule by mouth every morning (before breakfast) 90 capsule 0   • albuterol sulfate HFA (PROVENTIL;VENTOLIN;PROAIR) 108 (90 Base) MCG/ACT inhaler Inhale 2 
normal for race

## 2024-03-20 NOTE — PROGRESS NOTES
Spoke with patient and she will arrive at 0715 at [facility] on 3/21/2024 for her procedure at 0845. IV order is in [facility].

## 2024-03-21 ENCOUNTER — ANESTHESIA (OUTPATIENT)
Dept: ENDOSCOPY | Age: 74
End: 2024-03-21
Payer: MEDICARE

## 2024-03-21 ENCOUNTER — HOSPITAL ENCOUNTER (OUTPATIENT)
Age: 74
Setting detail: OUTPATIENT SURGERY
Discharge: HOME | End: 2024-03-21
Attending: SPECIALIST | Admitting: SPECIALIST
Payer: MEDICARE

## 2024-03-21 VITALS
OXYGEN SATURATION: 93 % | HEIGHT: 64 IN | DIASTOLIC BLOOD PRESSURE: 78 MMHG | BODY MASS INDEX: 21.85 KG/M2 | WEIGHT: 128 LBS | HEART RATE: 64 BPM | RESPIRATION RATE: 16 BRPM | SYSTOLIC BLOOD PRESSURE: 150 MMHG | TEMPERATURE: 97.8 F

## 2024-03-21 PROBLEM — K22.2 ESOPHAGEAL RING: Status: ACTIVE | Noted: 2024-03-21

## 2024-03-21 PROCEDURE — 3609017700 HC EGD DILATION GASTRIC/DUODENAL STRICTURE: Performed by: SPECIALIST

## 2024-03-21 PROCEDURE — C1726 CATH, BAL DIL, NON-VASCULAR: HCPCS | Performed by: SPECIALIST

## 2024-03-21 PROCEDURE — 2580000003 HC RX 258: Performed by: ANESTHESIOLOGY

## 2024-03-21 PROCEDURE — 6360000002 HC RX W HCPCS: Performed by: NURSE ANESTHETIST, CERTIFIED REGISTERED

## 2024-03-21 PROCEDURE — 2500000003 HC RX 250 WO HCPCS: Performed by: NURSE ANESTHETIST, CERTIFIED REGISTERED

## 2024-03-21 PROCEDURE — 2709999900 HC NON-CHARGEABLE SUPPLY: Performed by: SPECIALIST

## 2024-03-21 PROCEDURE — 7100000011 HC PHASE II RECOVERY - ADDTL 15 MIN: Performed by: SPECIALIST

## 2024-03-21 PROCEDURE — 7100000010 HC PHASE II RECOVERY - FIRST 15 MIN: Performed by: SPECIALIST

## 2024-03-21 PROCEDURE — 3700000000 HC ANESTHESIA ATTENDED CARE: Performed by: SPECIALIST

## 2024-03-21 PROCEDURE — 3700000001 HC ADD 15 MINUTES (ANESTHESIA): Performed by: SPECIALIST

## 2024-03-21 RX ORDER — LIDOCAINE HYDROCHLORIDE 20 MG/ML
INJECTION, SOLUTION EPIDURAL; INFILTRATION; INTRACAUDAL; PERINEURAL PRN
Status: DISCONTINUED | OUTPATIENT
Start: 2024-03-21 | End: 2024-03-21 | Stop reason: SDUPTHER

## 2024-03-21 RX ORDER — SODIUM CHLORIDE, SODIUM LACTATE, POTASSIUM CHLORIDE, CALCIUM CHLORIDE 600; 310; 30; 20 MG/100ML; MG/100ML; MG/100ML; MG/100ML
INJECTION, SOLUTION INTRAVENOUS CONTINUOUS
Status: DISCONTINUED | OUTPATIENT
Start: 2024-03-21 | End: 2024-03-21 | Stop reason: HOSPADM

## 2024-03-21 RX ORDER — PROPOFOL 10 MG/ML
INJECTION, EMULSION INTRAVENOUS PRN
Status: DISCONTINUED | OUTPATIENT
Start: 2024-03-21 | End: 2024-03-21 | Stop reason: SDUPTHER

## 2024-03-21 RX ADMIN — PROPOFOL 50 MG: 10 INJECTION, EMULSION INTRAVENOUS at 08:40

## 2024-03-21 RX ADMIN — PROPOFOL 50 MG: 10 INJECTION, EMULSION INTRAVENOUS at 08:51

## 2024-03-21 RX ADMIN — PROPOFOL 50 MG: 10 INJECTION, EMULSION INTRAVENOUS at 08:47

## 2024-03-21 RX ADMIN — SODIUM CHLORIDE, POTASSIUM CHLORIDE, SODIUM LACTATE AND CALCIUM CHLORIDE: 600; 310; 30; 20 INJECTION, SOLUTION INTRAVENOUS at 07:56

## 2024-03-21 RX ADMIN — PROPOFOL 50 MG: 10 INJECTION, EMULSION INTRAVENOUS at 08:42

## 2024-03-21 RX ADMIN — LIDOCAINE HYDROCHLORIDE 100 MG: 20 INJECTION, SOLUTION EPIDURAL; INFILTRATION; INTRACAUDAL; PERINEURAL at 08:40

## 2024-03-21 ASSESSMENT — PAIN SCALES - GENERAL
PAINLEVEL_OUTOF10: 0
PAINLEVEL_OUTOF10: 0

## 2024-03-21 ASSESSMENT — PAIN - FUNCTIONAL ASSESSMENT: PAIN_FUNCTIONAL_ASSESSMENT: 0-10

## 2024-03-21 NOTE — ANESTHESIA POSTPROCEDURE EVALUATION
Department of Anesthesiology  Postprocedure Note    Patient: Kanika Patterson  MRN: [id number]  YOB: 1950  Date of evaluation: 3/21/2024    Procedure Summary       Date: 03/21/24 Room / Location: [address]    Anesthesia Start: 0836 Anesthesia Stop: 0900    Procedure: ESOPHAGOGASTRODUODENOSCOPY DILATION BALLOON FROM 15MM UP TO 18MM WITH DILATION TO 18MM AND FROM 18MM UP TO 20MM WITH DILATION TO 20MM Diagnosis:       Esophageal dysphagia      (Esophageal dysphagia [R13.19])    Surgeons: Jame Alfaro MD Responsible Provider: Nura Bledsoe MD    Anesthesia Type: MAC ASA Status: 3            Anesthesia Type: No value filed.    Kayce Phase I: Kayce Score: 10    Kayce Phase II:      Anesthesia Post Evaluation    Patient location during evaluation: bedside  Patient participation: complete - patient participated  Level of consciousness: awake  Pain score: 0  Airway patency: patent  Nausea & Vomiting: no nausea and no vomiting  Cardiovascular status: hemodynamically stable  Respiratory status: airway suctioned and acceptable  Hydration status: stable  Pain management: adequate    No notable events documented.

## 2024-03-21 NOTE — PROGRESS NOTES
Pt returned to room from endo    Report received from Anika  Pt A&O, call light placed within reach, side rails up x's 2, pt tearful, spouse at bedside  0905 Dr Alfaro in room to speak with pt and spouse  0910 pt given water to drink, trip well, no difficulty swallowing water. Occ cough producing white/pinkish mucus  0925 assisted pt up in room to get dressed with assistance of spouse.  0932 Discharge instructions given to pt and spouse, both voiced understanding  0940 Pt escorted to main entrance via wheelchair for discharge with spouse.   49 y/o male with a hx of Bipolar presents to the ER c/o 5 days of left flank pain, hematuria and dysuria.  Pt states he saw his PMD yesterday and was told to go to the ER for evaluation.  Pt denies fevers, chills, nausea, vomiting, diarrhea, chest pain, sob, penile pain, testicular pain. 49 y/o male with a hx of Bipolar presents to the ER c/o 5 days of left flank pain, hematuria and dysuria.  Pt states he saw his PMD yesterday and was told to go to the ER for evaluation.  Pt denies fevers, chills, nausea, vomiting, diarrhea, chest pain, sob, penile pain, testicular pain.  Pt states hematuria has improved. 49 y/o male with a hx of Bipolar presents to the ER c/o 5 days of left flank pain, hematuria and dysuria.  Pt states he saw his PMD yesterday and was told to go to the ER for evaluation.  Pt denies fevers, chills, nausea, vomiting, diarrhea, chest pain, sob, penile pain, testicular pain.  Pt states hematuria has resolved.

## 2024-03-21 NOTE — DISCHARGE INSTRUCTIONS
[facility]  [phone]    Do not drive, work around machines or use equipment.  Do not drink any alcoholic beverages.  Do not smoke while alone.  Avoid making important decisions.  Plan to spend a quiet, relaxed evening @ home.  Resume normal activities as you begin to feel better.  Eat lightly for your first meal, then gradually increase your diet to what is normal for you.  In case of nausea, avoid food and drink only clear liquids.  Resume food as nausea ceases.  Notify your surgeon if you experience fever, chills, large amount of bleeding, difficulty breathing, persistent nausea and vomiting or any other disturbing problem.  Call for a follow-up appointment with your surgeon.

## 2024-03-21 NOTE — BRIEF OP NOTE
BRIEF EGD REPORT:   The original EGD report with photos can be found by going to \"chart review\" then \"notes\" then \"Upper GI endoscopy\" then \"scan....\"    Impression:         - scope passed easily through pylorus         -  Small hiatal hernia.         -  Moderate Schatzki ring.  Dilated.         - small mucosal disruption noted after dilating to 18 mm (expected finding)         -  Normal stomach.         -  Normal examined duodenum.         -  No specimens collected.    Recommendation:         - repeat esophageal dilation by Dr Augustine in 4-6 weeks- probably will be able to go to 20 mm then

## 2024-03-25 ENCOUNTER — APPOINTMENT (OUTPATIENT)
Dept: CT IMAGING | Age: 74
End: 2024-03-25
Payer: MEDICARE

## 2024-03-25 ENCOUNTER — OFFICE VISIT (OUTPATIENT)
Dept: PULMONOLOGY | Age: 74
End: 2024-03-25
Payer: MEDICARE

## 2024-03-25 ENCOUNTER — TELEPHONE (OUTPATIENT)
Dept: GASTROENTEROLOGY | Age: 74
End: 2024-03-25

## 2024-03-25 ENCOUNTER — HOSPITAL ENCOUNTER (EMERGENCY)
Age: 74
Discharge: HOME | End: 2024-03-25
Attending: EMERGENCY MEDICINE
Payer: MEDICARE

## 2024-03-25 VITALS
HEART RATE: 76 BPM | SYSTOLIC BLOOD PRESSURE: 136 MMHG | DIASTOLIC BLOOD PRESSURE: 67 MMHG | OXYGEN SATURATION: 95 % | TEMPERATURE: 98.1 F | RESPIRATION RATE: 16 BRPM

## 2024-03-25 VITALS — HEIGHT: 65 IN | WEIGHT: 128 LBS | HEART RATE: 89 BPM | OXYGEN SATURATION: 95 % | BODY MASS INDEX: 21.33 KG/M2

## 2024-03-25 DIAGNOSIS — E04.2 MULTIPLE THYROID NODULES: ICD-10-CM

## 2024-03-25 DIAGNOSIS — R13.12 OROPHARYNGEAL DYSPHAGIA: ICD-10-CM

## 2024-03-25 DIAGNOSIS — G72.41 INCLUSION BODY MYOSITIS: ICD-10-CM

## 2024-03-25 DIAGNOSIS — G47.34 NOCTURNAL HYPOXEMIA: ICD-10-CM

## 2024-03-25 DIAGNOSIS — J43.9 PULMONARY EMPHYSEMA, UNSPECIFIED EMPHYSEMA TYPE (HCC): ICD-10-CM

## 2024-03-25 DIAGNOSIS — J44.9 STAGE 2 MODERATE COPD BY GOLD CLASSIFICATION (HCC): Primary | ICD-10-CM

## 2024-03-25 DIAGNOSIS — R06.02 SHORTNESS OF BREATH: ICD-10-CM

## 2024-03-25 DIAGNOSIS — R09.A2 GLOBUS SENSATION: Primary | ICD-10-CM

## 2024-03-25 DIAGNOSIS — Z87.891 FORMER SMOKER: ICD-10-CM

## 2024-03-25 LAB
ALBUMIN SERPL-MCNC: 3.9 GM/DL (ref 3.4–5)
ALP BLD-CCNC: 100 IU/L (ref 40–128)
ALT SERPL-CCNC: 18 U/L (ref 10–40)
ANION GAP SERPL CALCULATED.3IONS-SCNC: 11 MMOL/L (ref 7–16)
AST SERPL-CCNC: 21 IU/L (ref 15–37)
BASOPHILS ABSOLUTE: 0 K/CU MM
BASOPHILS RELATIVE PERCENT: 0.5 % (ref 0–1)
BILIRUB SERPL-MCNC: 0.5 MG/DL (ref 0–1)
BUN SERPL-MCNC: 32 MG/DL (ref 6–23)
CALCIUM SERPL-MCNC: 9.9 MG/DL (ref 8.3–10.6)
CHLORIDE BLD-SCNC: 101 MMOL/L (ref 99–110)
CO2: 26 MMOL/L (ref 21–32)
CREAT SERPL-MCNC: 0.9 MG/DL (ref 0.6–1.1)
DIFFERENTIAL TYPE: ABNORMAL
EOSINOPHILS ABSOLUTE: 0.1 K/CU MM
EOSINOPHILS RELATIVE PERCENT: 1 % (ref 0–3)
GFR SERPL CREATININE-BSD FRML MDRD: 67 ML/MIN/1.73M2
GLUCOSE SERPL-MCNC: 105 MG/DL (ref 70–99)
HCT VFR BLD CALC: 43.3 % (ref 37–47)
HEMOGLOBIN: 13.7 GM/DL (ref 12.5–16)
IMMATURE NEUTROPHIL %: 0.2 % (ref 0–0.43)
LYMPHOCYTES ABSOLUTE: 1.1 K/CU MM
LYMPHOCYTES RELATIVE PERCENT: 18.5 % (ref 24–44)
MCH RBC QN AUTO: 30.4 PG (ref 27–31)
MCHC RBC AUTO-ENTMCNC: 31.6 % (ref 32–36)
MCV RBC AUTO: 96.2 FL (ref 78–100)
MONOCYTES ABSOLUTE: 0.6 K/CU MM
MONOCYTES RELATIVE PERCENT: 9.3 % (ref 0–4)
NUCLEATED RBC %: 0 %
PDW BLD-RTO: 13.2 % (ref 11.7–14.9)
PLATELET # BLD: 214 K/CU MM (ref 140–440)
PMV BLD AUTO: 9.4 FL (ref 7.5–11.1)
POTASSIUM SERPL-SCNC: 4.6 MMOL/L (ref 3.5–5.1)
RBC # BLD: 4.5 M/CU MM (ref 4.2–5.4)
SEGMENTED NEUTROPHILS ABSOLUTE COUNT: 4.3 K/CU MM
SEGMENTED NEUTROPHILS RELATIVE PERCENT: 70.5 % (ref 36–66)
SODIUM BLD-SCNC: 138 MMOL/L (ref 135–145)
TOTAL IMMATURE NEUTOROPHIL: 0.01 K/CU MM
TOTAL NUCLEATED RBC: 0 K/CU MM
TOTAL PROTEIN: 6.8 GM/DL (ref 6.4–8.2)
WBC # BLD: 6.1 K/CU MM (ref 4–10.5)

## 2024-03-25 PROCEDURE — 80053 COMPREHEN METABOLIC PANEL: CPT

## 2024-03-25 PROCEDURE — 6360000004 HC RX CONTRAST MEDICATION: Performed by: EMERGENCY MEDICINE

## 2024-03-25 PROCEDURE — 71260 CT THORAX DX C+: CPT

## 2024-03-25 PROCEDURE — 70491 CT SOFT TISSUE NECK W/DYE: CPT

## 2024-03-25 PROCEDURE — G8484 FLU IMMUNIZE NO ADMIN: HCPCS | Performed by: INTERNAL MEDICINE

## 2024-03-25 PROCEDURE — G8420 CALC BMI NORM PARAMETERS: HCPCS | Performed by: INTERNAL MEDICINE

## 2024-03-25 PROCEDURE — 3023F SPIROM DOC REV: CPT | Performed by: INTERNAL MEDICINE

## 2024-03-25 PROCEDURE — 1123F ACP DISCUSS/DSCN MKR DOCD: CPT | Performed by: INTERNAL MEDICINE

## 2024-03-25 PROCEDURE — 3017F COLORECTAL CA SCREEN DOC REV: CPT | Performed by: INTERNAL MEDICINE

## 2024-03-25 PROCEDURE — G8400 PT W/DXA NO RESULTS DOC: HCPCS | Performed by: INTERNAL MEDICINE

## 2024-03-25 PROCEDURE — 99285 EMERGENCY DEPT VISIT HI MDM: CPT

## 2024-03-25 PROCEDURE — 85025 COMPLETE CBC W/AUTO DIFF WBC: CPT

## 2024-03-25 PROCEDURE — 1036F TOBACCO NON-USER: CPT | Performed by: INTERNAL MEDICINE

## 2024-03-25 PROCEDURE — G8427 DOCREV CUR MEDS BY ELIG CLIN: HCPCS | Performed by: INTERNAL MEDICINE

## 2024-03-25 PROCEDURE — 1090F PRES/ABSN URINE INCON ASSESS: CPT | Performed by: INTERNAL MEDICINE

## 2024-03-25 PROCEDURE — 99213 OFFICE O/P EST LOW 20 MIN: CPT | Performed by: INTERNAL MEDICINE

## 2024-03-25 RX ORDER — LIDOCAINE HYDROCHLORIDE 20 MG/ML
15 SOLUTION OROPHARYNGEAL ONCE
Status: DISCONTINUED | OUTPATIENT
Start: 2024-03-25 | End: 2024-03-25 | Stop reason: HOSPADM

## 2024-03-25 RX ORDER — LIDOCAINE HYDROCHLORIDE 20 MG/ML
15 SOLUTION OROPHARYNGEAL
Qty: 100 ML | Refills: 1 | Status: SHIPPED | OUTPATIENT
Start: 2024-03-25

## 2024-03-25 RX ADMIN — IOPAMIDOL 75 ML: 755 INJECTION, SOLUTION INTRAVENOUS at 17:29

## 2024-03-25 NOTE — PROGRESS NOTES
instructions.    Return in about 6 months (around 9/25/2024) for Recheck for COPD, Recheck for Shortness of Breath, Nocturnal hypoxemia.      This dictation was performed with a verbal recognition program and it was checked for errors.  It is possible that there are still dictated errors within this office note.  Any errors should be brought immediately to my attention for correction.  All efforts were made to ensure that this office note is accurate.

## 2024-03-25 NOTE — TELEPHONE ENCOUNTER
Please call and schedule repeat EGD with esophageal dilation by Dr. Augustine as directed by Dr. Alfaro    Impression:         - scope passed easily through pylorus         -  Small hiatal hernia.         -  Moderate Schatzki ring.  Dilated.         - small mucosal disruption noted after dilating to 18 mm (expected finding)         -  Normal stomach.         -  Normal examined duodenum.         -  No specimens collected.     Recommendation:         - repeat esophageal dilation by Dr Augustine in 4-6 weeks- probably will be able to go to 20 mm then

## 2024-03-25 NOTE — TELEPHONE ENCOUNTER
Pt had an egd on Thursday and it feels like something is stuck in her throat. Her voice is very horse and with a sore throat. She has had in the past with no issues after. What can she take to get her voice back, if nothing how long will her throat feel like something is stuck in there? Or when should she anticipate her voice coming back. Thanks

## 2024-03-25 NOTE — ED NOTES
[phone] paged Dr Augustine    [phone] paged Dr Alfaro    [phone] repaged Dr Alfaro    [phone] called Dr Alfaro

## 2024-03-25 NOTE — ED PROVIDER NOTES
Resource Strain: Not on file   Food Insecurity: Not on file   Transportation Needs: Not on file   Physical Activity: Not on file   Stress: Not on file   Social Connections: Not on file   Intimate Partner Violence: Not on file   Housing Stability: Not on file     Current Facility-Administered Medications   Medication Dose Route Frequency Provider Last Rate Last Admin    lidocaine viscous hcl (XYLOCAINE) 2 % solution 15 mL  15 mL Mouth/Throat Once Derrick Bennett MD         Current Outpatient Medications   Medication Sig Dispense Refill    lidocaine viscous hcl (XYLOCAINE) 2 % SOLN solution Take 15 mLs by mouth every 3 hours as needed for Irritation or Pain 100 mL 1    Budeson-Glycopyrrol-Formoterol (BREZTRI AEROSPHERE) 160-9-4.8 MCG/ACT AERO Inhale into the lungs (Patient not taking: Reported on 3/21/2024)      vitamin D (CHOLECALCIFEROL) 50 MCG (2000 UT) CAPS capsule Take 1 capsule every day by oral route.      dilTIAZem (TIAZAC) 120 MG extended release capsule       lisinopril (PRINIVIL;ZESTRIL) 20 MG tablet Take 1 tablet by mouth daily      omeprazole (PRILOSEC) 40 MG delayed release capsule Take 1 capsule by mouth every morning (before breakfast) 90 capsule 0    albuterol sulfate HFA (PROVENTIL;VENTOLIN;PROAIR) 108 (90 Base) MCG/ACT inhaler Inhale 2 puffs into the lungs every 4 hours as needed for Wheezing 1 each 11     Allergies   Allergen Reactions    Percocet [Oxycodone-Acetaminophen] Other (See Comments)     Patient BP dramatically decreases when given Percocet       Nursing Notes Reviewed    Physical Exam:  ED Triage Vitals [03/25/24 1518]   Enc Vitals Group      BP (!) 142/80      Pulse 90      Respirations 16      Temp 98.1 °F (36.7 °C)      Temp src       SpO2 97 %      Weight       Height       Head Circumference       Peak Flow       Pain Score       Pain Loc       Pain Edu?       Excl. in GC?        My pulse ox interpretation is - normal    General appearance:  No acute distress.  Appears overall well.

## 2024-03-28 ENCOUNTER — TELEPHONE (OUTPATIENT)
Dept: GASTROENTEROLOGY | Age: 74
End: 2024-03-28

## 2024-03-29 ENCOUNTER — TELEPHONE (OUTPATIENT)
Dept: GASTROENTEROLOGY | Age: 74
End: 2024-03-29

## 2024-03-29 NOTE — TELEPHONE ENCOUNTER
Patient called in stating that she is still experiencing the globus sensation with no change since Tuesday. She wanted to know if this was normal due to the procedure being done over a week ago.

## 2024-04-08 RX ORDER — OMEPRAZOLE 40 MG/1
40 CAPSULE, DELAYED RELEASE ORAL
Qty: 90 CAPSULE | Refills: 0 | Status: SHIPPED | OUTPATIENT
Start: 2024-04-08

## 2024-04-10 ENCOUNTER — TELEPHONE (OUTPATIENT)
Dept: GASTROENTEROLOGY | Age: 74
End: 2024-04-10

## 2024-04-10 NOTE — TELEPHONE ENCOUNTER
Pt called to express her gratitude to Dr. Alfaro. She states the issues she was having with her throat has resolve on it's own. She does not need a call back but wanted Dr. Alfaro to know she is appreciative for all his help.

## 2024-04-25 NOTE — PROGRESS NOTES
Patient will be called with an arrival time on 5/1/2024 for her procedure at [facility] on 5/2/2024.    NOTHING TO EAT OR DRINK AFTER MIDNIGHT DAY OF SURGERY    1. Enter thru the hospital main entrance on day of surgery, check in at the Information Desk. If you arrive prior to 6:00am, enter thru the ER entrance.    2. Follow the directions as prescribed by the doctor for your procedure and medications.         Morning of surgery take:prilosec.         Stop vitamins, supplements and NSAIDS:      3. Check with your Doctor regarding stopping blood thinners and follow their instructions.    4. Do not smoke, vape or use chewing tobacco morning of surgery. Do not drink any alcoholic beverages 24 hours prior to surgery.       This includes NA Beer. No street drugs 7 days prior to surgery.    5. If you have dentures, contacts of glasses they will be removed before going to the OR; please bring a case.    6. Please bring picture ID, insurance card, paperwork from the doctor’s office (H & P, Consent, & card for implantable devices).    7. Take a shower with an antibacterial soap the night before surgery and the morning of surgery. Do not put anything on your skin      After your morning shower.    8. You will need a responsible adult to drive you home and check on you after surgery.

## 2024-05-01 ENCOUNTER — ANESTHESIA EVENT (OUTPATIENT)
Dept: ENDOSCOPY | Age: 74
End: 2024-05-01
Payer: MEDICARE

## 2024-05-01 ASSESSMENT — ENCOUNTER SYMPTOMS: SHORTNESS OF BREATH: 1

## 2024-05-01 NOTE — PROGRESS NOTES
Spoke with patient and she will arrive at 0945 at [facility] on 5/2/2024 for her procedure at 1115. IV order is in [facility].

## 2024-05-02 ENCOUNTER — HOSPITAL ENCOUNTER (OUTPATIENT)
Age: 74
Setting detail: OUTPATIENT SURGERY
Discharge: HOME | End: 2024-05-02
Attending: INTERNAL MEDICINE | Admitting: INTERNAL MEDICINE
Payer: MEDICARE

## 2024-05-02 ENCOUNTER — ANESTHESIA (OUTPATIENT)
Dept: ENDOSCOPY | Age: 74
End: 2024-05-02
Payer: MEDICARE

## 2024-05-02 VITALS
BODY MASS INDEX: 21.33 KG/M2 | OXYGEN SATURATION: 93 % | HEIGHT: 65 IN | DIASTOLIC BLOOD PRESSURE: 77 MMHG | TEMPERATURE: 98.5 F | RESPIRATION RATE: 16 BRPM | WEIGHT: 128 LBS | HEART RATE: 70 BPM | SYSTOLIC BLOOD PRESSURE: 140 MMHG

## 2024-05-02 DIAGNOSIS — R13.19 ESOPHAGEAL DYSPHAGIA: ICD-10-CM

## 2024-05-02 PROCEDURE — 3700000001 HC ADD 15 MINUTES (ANESTHESIA): Performed by: INTERNAL MEDICINE

## 2024-05-02 PROCEDURE — 6360000002 HC RX W HCPCS: Performed by: NURSE ANESTHETIST, CERTIFIED REGISTERED

## 2024-05-02 PROCEDURE — C1726 CATH, BAL DIL, NON-VASCULAR: HCPCS | Performed by: INTERNAL MEDICINE

## 2024-05-02 PROCEDURE — 7100000011 HC PHASE II RECOVERY - ADDTL 15 MIN: Performed by: INTERNAL MEDICINE

## 2024-05-02 PROCEDURE — 88305 TISSUE EXAM BY PATHOLOGIST: CPT

## 2024-05-02 PROCEDURE — 2709999900 HC NON-CHARGEABLE SUPPLY: Performed by: INTERNAL MEDICINE

## 2024-05-02 PROCEDURE — 3609017700 HC EGD DILATION GASTRIC/DUODENAL STRICTURE: Performed by: INTERNAL MEDICINE

## 2024-05-02 PROCEDURE — 3700000000 HC ANESTHESIA ATTENDED CARE: Performed by: INTERNAL MEDICINE

## 2024-05-02 PROCEDURE — 43249 ESOPH EGD DILATION <30 MM: CPT | Performed by: INTERNAL MEDICINE

## 2024-05-02 PROCEDURE — 2580000003 HC RX 258: Performed by: ANESTHESIOLOGY

## 2024-05-02 PROCEDURE — 43239 EGD BIOPSY SINGLE/MULTIPLE: CPT | Performed by: INTERNAL MEDICINE

## 2024-05-02 PROCEDURE — 7100000010 HC PHASE II RECOVERY - FIRST 15 MIN: Performed by: INTERNAL MEDICINE

## 2024-05-02 RX ORDER — PROPOFOL 10 MG/ML
INJECTION, EMULSION INTRAVENOUS PRN
Status: DISCONTINUED | OUTPATIENT
Start: 2024-05-02 | End: 2024-05-02 | Stop reason: SDUPTHER

## 2024-05-02 RX ORDER — SODIUM CHLORIDE, SODIUM LACTATE, POTASSIUM CHLORIDE, CALCIUM CHLORIDE 600; 310; 30; 20 MG/100ML; MG/100ML; MG/100ML; MG/100ML
INJECTION, SOLUTION INTRAVENOUS CONTINUOUS
Status: DISCONTINUED | OUTPATIENT
Start: 2024-05-02 | End: 2024-05-02 | Stop reason: HOSPADM

## 2024-05-02 RX ADMIN — PROPOFOL 50 MG: 10 INJECTION, EMULSION INTRAVENOUS at 12:32

## 2024-05-02 RX ADMIN — SODIUM CHLORIDE, POTASSIUM CHLORIDE, SODIUM LACTATE AND CALCIUM CHLORIDE: 600; 310; 30; 20 INJECTION, SOLUTION INTRAVENOUS at 12:20

## 2024-05-02 RX ADMIN — PROPOFOL 150 MG: 10 INJECTION, EMULSION INTRAVENOUS at 12:24

## 2024-05-02 ASSESSMENT — PAIN SCALES - GENERAL: PAINLEVEL_OUTOF10: 0

## 2024-05-02 ASSESSMENT — PAIN - FUNCTIONAL ASSESSMENT
PAIN_FUNCTIONAL_ASSESSMENT: 0-10
PAIN_FUNCTIONAL_ASSESSMENT: 0-10

## 2024-05-02 ASSESSMENT — ENCOUNTER SYMPTOMS: SHORTNESS OF BREATH: 1

## 2024-05-02 NOTE — ANESTHESIA PRE PROCEDURE
Department of Anesthesiology  Preprocedure Note       Name:  Kanika Patterson   Age:  74 y.o.  :  1950                                          MRN:  [id number]         Date:  2024      Surgeon: Surgeon(s):  Betty Augustine MD    Procedure: Procedure(s):  ESOPHAGOGASTRODUODENOSCOPY DILATION BALLOON 18-20 up to 19 with biopsies    Medications prior to admission:   Prior to Admission medications    Medication Sig Start Date End Date Taking? Authorizing Provider   omeprazole (PRILOSEC) 40 MG delayed release capsule TAKE ONE CAPSULE BY MOUTH EVERY MORNING BEFORE BREAKFAST 24   Betty Augustine MD   lidocaine viscous hcl (XYLOCAINE) 2 % SOLN solution Take 15 mLs by mouth every 3 hours as needed for Irritation or Pain 3/25/24   Derrick Bennett MD   Budeson-Glycopyrrol-Formoterol (BREZTRI AEROSPHERE) 160-9-4.8 MCG/ACT AERO Inhale into the lungs  Patient not taking: Reported on 3/21/2024    Tomasz Gould MD   vitamin D (CHOLECALCIFEROL) 50 MCG (2000 UT) CAPS capsule Take 1 capsule every day by oral route. 3/28/19   Tomasz Gould MD   dilTIAZem (TIAZAC) 120 MG extended release capsule  23   Tomasz Gould MD   lisinopril (PRINIVIL;ZESTRIL) 20 MG tablet Take 1 tablet by mouth daily    Tomasz Gould MD   albuterol sulfate HFA (PROVENTIL;VENTOLIN;PROAIR) 108 (90 Base) MCG/ACT inhaler Inhale 2 puffs into the lungs every 4 hours as needed for Wheezing 23   Issa Calloway MD       Current medications:    Current Facility-Administered Medications   Medication Dose Route Frequency Provider Last Rate Last Admin   • lactated ringers IV soln infusion   IntraVENous Continuous Nura Bledsoe  mL/hr at 24 1220 New Bag at 24 1220       Allergies:    Allergies   Allergen Reactions   • Percocet [Oxycodone-Acetaminophen] Other (See Comments)     Patient BP dramatically decreases when given Percocet       Problem List:    Patient Active Problem List   Diagnosis Code   • 
Reported on 3/21/2024)      vitamin D (CHOLECALCIFEROL) 50 MCG (2000 UT) CAPS capsule Take 1 capsule every day by oral route.      dilTIAZem (TIAZAC) 120 MG extended release capsule       lisinopril (PRINIVIL;ZESTRIL) 20 MG tablet Take 1 tablet by mouth daily      albuterol sulfate HFA (PROVENTIL;VENTOLIN;PROAIR) 108 (90 Base) MCG/ACT inhaler Inhale 2 puffs into the lungs every 4 hours as needed for Wheezing 1 each 11       Allergies:    Allergies   Allergen Reactions    Percocet [Oxycodone-Acetaminophen] Other (See Comments)     Patient BP dramatically decreases when given Percocet       Problem List:    Patient Active Problem List   Diagnosis Code    AAA (abdominal aortic aneurysm) ([facility]) I71.40    Essential hypertension I10    KERI (acute kidney injury) ([facility]) N17.9    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    Generalized weakness R53.1    Gait disturbance R26.9    Oropharyngeal dysphagia R13.12    Acute kidney injury (KERI) with acute tubular necrosis (ATN) ([facility]) N17.0    Hypokalemia E87.6    Closed fracture of one rib of left side S22.32XA    Irritable bowel syndrome with both constipation and diarrhea K58.2    Closed nondisplaced fracture of phalanx of toe S92.919A    Pancytopenia ([facility]) D61.818    Ankle fracture, bimalleolar, closed, left, initial encounter S82.842A    Right foot drop M21.371    Closed bimalleolar fracture of left ankle S82.842A    Stage 2 moderate COPD by GOLD classification ([facility]) J44.9    Shortness of breath R06.02    Former smoker Z87.891    Pulmonary emphysema ([facility]) J43.9    Nocturnal hypoxemia G47.34    Esophageal dysphagia R13.19    Esophageal ring K22.2       Past Medical History:        Diagnosis Date    Abdominal aortic aneurysm (AAA) >39 mm diameter ([facility])     Former smoker 12/11/2023    Hypertension     IBS (irritable bowel syndrome)     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

## 2024-05-02 NOTE — H&P
ENDOSCOPY   Pre-operative History and Physical    Patient: Kanika Patterson  : 1950      History Obtained From:  patient, electronic medical record        HISTORY OF PRESENT ILLNESS:                The patient is a 74 y.o. female with significant past medical history as below who presents for EGD with dilation     Indication Dysphagia, Schatzki ring.     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[facility])     Nocturnal hypoxemia 01/15/2024    States is waiting on oxygen for HS    Shortness of breath 2023    Stage 2 moderate COPD by GOLD classification ([facility]) 2023       Past Surgical History:        Procedure Laterality Date    ANKLE FRACTURE SURGERY Left 2023    LEFT ANKLE OPEN REDUCTION INTERNAL FIXATION performed by Maurizio Cooper MD at [facility] OR    BLADDER SURGERY      bladder sling    COLONOSCOPY      EYE SURGERY Bilateral     cataract    TONSILLECTOMY      TUBAL LIGATION      UPPER GASTROINTESTINAL ENDOSCOPY N/A 2024    ESOPHAGOGASTRODUODENOSCOPY DILATION BALLOON FROM 10MM UP TO 12MM  WITH DILATION IN THE PYLORIS TO 10.5MM, MID AND UPPER ESOPHAGUS TO 12MM AND LOWER ESOPHAGUS TO 12MM WITH BIOPSIES TAKEN FOR GASTRITIS performed by Betty Augustine MD at [facility] ENDOSCOPY    UPPER GASTROINTESTINAL ENDOSCOPY N/A 3/21/2024    ESOPHAGOGASTRODUODENOSCOPY DILATION BALLOON FROM 15MM UP TO 18MM WITH DILATION TO 18MM AND FROM 18MM UP TO 20MM WITH DILATION TO 20MM performed by Jame Alfaro MD at [facility] ENDOSCOPY         Current Medications:    Medications    Prior to Admission medications    Medication Sig Start Date End Date Taking? Authorizing Provider   omeprazole (PRILOSEC) 40 MG delayed release capsule TAKE ONE CAPSULE BY MOUTH EVERY MORNING BEFORE BREAKFAST 24   Betty Augustine MD   lidocaine viscous hcl (XYLOCAINE) 2 % SOLN

## 2024-05-02 NOTE — DISCHARGE INSTRUCTIONS
under license by [employer]. This care instruction is for use with your licensed healthcare professional. If you have questions about a medical condition or this instruction, always ask your healthcare professional. [employer] disclaims any warranty or liability for your use of this information.  Content Version: 10.8.705103; Current as of: November 20, 2015

## 2024-05-02 NOTE — PROGRESS NOTES
1247 patient to room from endo patient a/o vss assessment wnl, at bedside,beverage of choice given  1314 vss assessment wnl,denies any pain,iv removed  Up to the side of the bed to get dressed,discharge instructions reviewed with patient and family voiced understanding,  1317 endo called informed Dr Augustine will be here to speak with the patient in 5 minutes

## 2024-05-08 NOTE — RESULT ENCOUNTER NOTE
Please call and schedule the patient for a follow-up appointment.  Please inform the patient that there esophagus biopsies showed that they have EOE eosinophilia with greater than 30 eosinophils per high-power field.  They are negative for Barba's esophagus and fungal infection.

## 2024-07-10 RX ORDER — OMEPRAZOLE 40 MG/1
40 CAPSULE, DELAYED RELEASE ORAL
Qty: 90 CAPSULE | Refills: 0 | Status: SHIPPED | OUTPATIENT
Start: 2024-07-10

## 2024-07-17 ENCOUNTER — OFFICE VISIT (OUTPATIENT)
Dept: GASTROENTEROLOGY | Age: 74
End: 2024-07-17
Payer: MEDICARE

## 2024-07-17 VITALS
OXYGEN SATURATION: 92 % | TEMPERATURE: 97.3 F | BODY MASS INDEX: 21.64 KG/M2 | HEIGHT: 64 IN | SYSTOLIC BLOOD PRESSURE: 122 MMHG | HEART RATE: 74 BPM | DIASTOLIC BLOOD PRESSURE: 60 MMHG

## 2024-07-17 DIAGNOSIS — R13.19 ESOPHAGEAL DYSPHAGIA: Primary | ICD-10-CM

## 2024-07-17 DIAGNOSIS — K22.2 SCHATZKI'S RING OF DISTAL ESOPHAGUS: ICD-10-CM

## 2024-07-17 DIAGNOSIS — K58.0 IRRITABLE BOWEL SYNDROME WITH DIARRHEA: ICD-10-CM

## 2024-07-17 PROCEDURE — 1090F PRES/ABSN URINE INCON ASSESS: CPT | Performed by: INTERNAL MEDICINE

## 2024-07-17 PROCEDURE — 3017F COLORECTAL CA SCREEN DOC REV: CPT | Performed by: INTERNAL MEDICINE

## 2024-07-17 PROCEDURE — 3074F SYST BP LT 130 MM HG: CPT | Performed by: INTERNAL MEDICINE

## 2024-07-17 PROCEDURE — G8400 PT W/DXA NO RESULTS DOC: HCPCS | Performed by: INTERNAL MEDICINE

## 2024-07-17 PROCEDURE — G8427 DOCREV CUR MEDS BY ELIG CLIN: HCPCS | Performed by: INTERNAL MEDICINE

## 2024-07-17 PROCEDURE — 3078F DIAST BP <80 MM HG: CPT | Performed by: INTERNAL MEDICINE

## 2024-07-17 PROCEDURE — 1123F ACP DISCUSS/DSCN MKR DOCD: CPT | Performed by: INTERNAL MEDICINE

## 2024-07-17 PROCEDURE — 99214 OFFICE O/P EST MOD 30 MIN: CPT | Performed by: INTERNAL MEDICINE

## 2024-07-17 PROCEDURE — G8420 CALC BMI NORM PARAMETERS: HCPCS | Performed by: INTERNAL MEDICINE

## 2024-07-17 PROCEDURE — 1036F TOBACCO NON-USER: CPT | Performed by: INTERNAL MEDICINE

## 2024-07-17 RX ORDER — PANTOPRAZOLE SODIUM 40 MG/1
40 TABLET, DELAYED RELEASE ORAL
Qty: 90 TABLET | Refills: 1 | Status: SHIPPED | OUTPATIENT
Start: 2024-07-17

## 2024-07-17 NOTE — PROGRESS NOTES
pulse 74, temperature 97.3 °F (36.3 °C), temperature source Infrared, height 1.638 m (5' 4.49\"), SpO2 92 %.    General appearance: alert, cooperative, no distress, appears stated age  Head: Normocephalic, without obvious abnormality, atraumatic  Eyes: conjunctivae/corneas clear. EOM's intact.   Nose: Nares normal. No discharge  Throat: Lips, mucosa, and tongue normal. Teeth and gums normal  Neck: supple, symmetrical, trachea midline and no adenopathy  Back: symmetric, no curvature. No CVA tenderness., no kyphosis present, no scoliosis present  Lungs: clear to auscultation bilaterally, no wheezes, rales, or ronchi, normal respiratory effort  Heart: regular rate and rhythm, S1, S2 normal, no murmur, click, rub or gallop  Abdomen: soft, non-tender. No masses,  no hepatospleenomegally  Extremities: extremities normal, atraumatic, no cyanosis or edema  Skin: Skin color, texture, turgor normal. No rashes or lesions  Neurologic: Non focal, speech clear,   Psychiatry: Mood appropriate, no evidence of psychosis        Labs: Reviewed last labs/outside records          Assessment and Plan:  Kanika was seen today for follow up after procedure.    Diagnoses and all orders for this visit:    Esophageal dysphagia    Irritable bowel syndrome with diarrhea  -     Calprotectin Stool; Future  -     C-Reactive Protein; Future    Schatzki's ring of distal esophagus    Other orders  -     pantoprazole (PROTONIX) 40 MG tablet; Take 1 tablet by mouth every morning (before breakfast)         Diagnosis Orders   1. Esophageal dysphagia        2. Irritable bowel syndrome with diarrhea  Calprotectin Stool    C-Reactive Protein      3. Schatzki's ring of distal esophagus            Orders Placed This Encounter   Procedures    Calprotectin Stool     Standing Status:   Future     Standing Expiration Date:   7/17/2025    C-Reactive Protein     Standing Status:   Future     Standing Expiration Date:   7/17/2025     #1 dysphagia  -Patient has

## 2024-10-10 ENCOUNTER — OFFICE VISIT (OUTPATIENT)
Dept: PULMONOLOGY | Age: 74
End: 2024-10-10
Payer: MEDICARE

## 2024-10-10 ENCOUNTER — TELEPHONE (OUTPATIENT)
Dept: PULMONOLOGY | Age: 74
End: 2024-10-10

## 2024-10-10 VITALS
WEIGHT: 128 LBS | OXYGEN SATURATION: 97 % | BODY MASS INDEX: 21.33 KG/M2 | HEIGHT: 65 IN | SYSTOLIC BLOOD PRESSURE: 128 MMHG | HEART RATE: 56 BPM | DIASTOLIC BLOOD PRESSURE: 62 MMHG

## 2024-10-10 DIAGNOSIS — G47.34 NOCTURNAL HYPOXEMIA: ICD-10-CM

## 2024-10-10 DIAGNOSIS — J44.9 STAGE 2 MODERATE COPD BY GOLD CLASSIFICATION (HCC): Primary | ICD-10-CM

## 2024-10-10 DIAGNOSIS — Z87.891 FORMER SMOKER: ICD-10-CM

## 2024-10-10 DIAGNOSIS — J43.9 PULMONARY EMPHYSEMA, UNSPECIFIED EMPHYSEMA TYPE (HCC): ICD-10-CM

## 2024-10-10 DIAGNOSIS — R06.02 SHORTNESS OF BREATH: ICD-10-CM

## 2024-10-10 DIAGNOSIS — G72.41 INCLUSION BODY MYOSITIS: ICD-10-CM

## 2024-10-10 PROCEDURE — 3017F COLORECTAL CA SCREEN DOC REV: CPT | Performed by: INTERNAL MEDICINE

## 2024-10-10 PROCEDURE — 1123F ACP DISCUSS/DSCN MKR DOCD: CPT | Performed by: INTERNAL MEDICINE

## 2024-10-10 PROCEDURE — G8427 DOCREV CUR MEDS BY ELIG CLIN: HCPCS | Performed by: INTERNAL MEDICINE

## 2024-10-10 PROCEDURE — 3074F SYST BP LT 130 MM HG: CPT | Performed by: INTERNAL MEDICINE

## 2024-10-10 PROCEDURE — G8484 FLU IMMUNIZE NO ADMIN: HCPCS | Performed by: INTERNAL MEDICINE

## 2024-10-10 PROCEDURE — 99213 OFFICE O/P EST LOW 20 MIN: CPT | Performed by: INTERNAL MEDICINE

## 2024-10-10 PROCEDURE — G8420 CALC BMI NORM PARAMETERS: HCPCS | Performed by: INTERNAL MEDICINE

## 2024-10-10 PROCEDURE — 1036F TOBACCO NON-USER: CPT | Performed by: INTERNAL MEDICINE

## 2024-10-10 PROCEDURE — 3078F DIAST BP <80 MM HG: CPT | Performed by: INTERNAL MEDICINE

## 2024-10-10 PROCEDURE — G8400 PT W/DXA NO RESULTS DOC: HCPCS | Performed by: INTERNAL MEDICINE

## 2024-10-10 PROCEDURE — 3023F SPIROM DOC REV: CPT | Performed by: INTERNAL MEDICINE

## 2024-10-10 PROCEDURE — 1090F PRES/ABSN URINE INCON ASSESS: CPT | Performed by: INTERNAL MEDICINE

## 2024-10-10 RX ORDER — LISINOPRIL AND HYDROCHLOROTHIAZIDE 20; 25 MG/1; MG/1
1 TABLET ORAL DAILY
COMMUNITY
Start: 2024-08-22

## 2024-10-10 NOTE — TELEPHONE ENCOUNTER
Please review patient's chart for an updated CT. She may need to come back around 4/25/24 to be seen again.

## 2024-10-10 NOTE — PROGRESS NOTES
SUBJECTIVE:  Chief Complaint: Moderate/stage II COPD, pulmonary emphysema, shortness of breath, nocturnal hypoxemia, inclusion body myositis, former smoker  Kanika has done very well over the past 6 months and denies any recent episodes of bronchitis, worsening shortness of breath or chest congestion.  She states that several months ago she stopped using Breztri and simply uses albuterol rescue inhaler as needed.  She still notes some dyspnea on exertion but has a hard time ambulating because of her slowly progressive neurologic disease/inclusion body myositis.  She continues to wear oxygen at nighttime but has not demonstrated the need for oxygen during the daytime and did not qualify for daytime oxygen in the past.  She mentions that her difficulty swallowing has cleared and she is not having any issues following her endoscopy this spring.  She has had no vaccines this fall      ROS:  Constitution:  HEENT: Negative for ear, throat pain  Cardiovascular: Negative for chest pain, syncope, edema  Pulmonary: See HPI  Musculoskeletal: Negative for DVT, myalgias, arthralgias    OBJECTIVE:  /62   Pulse 56   Ht 1.651 m (5' 5\")   Wt 58.1 kg (128 lb)   SpO2 97%   BMI 21.30 kg/m²      Physical Exam:  Constitutional:  She appears well developed and well-nourished.  In a wheelchair but does not appear to be in any type of respiratory distress at rest.  Not using accessory muscles respiration  Neck:  Supple, No palpable lymphadenopathy, No JVD  Cardiovascular:  S1, S2 Normal, Regular rhythm, no murmurs or gallops, No pericardial  rubs.  Pulmonary: Mildly diminished breath sounds throughout all lung areas without wheezing or rhonchi  Abdomen: Not examined  Extremities: no edema, No DVT  Neurologic: Oriented x3, muscle weakness noted but not directly examined    Radiology: CT chest with contrast on 3/25/2024 following endoscopy with difficulty swallowing.  Showed no acute traumatic injury.  No mediastinal air or

## 2025-01-30 RX ORDER — PANTOPRAZOLE SODIUM 40 MG/1
40 TABLET, DELAYED RELEASE ORAL
Qty: 90 TABLET | Refills: 1 | Status: SHIPPED | OUTPATIENT
Start: 2025-01-30

## 2025-04-28 ENCOUNTER — OFFICE VISIT (OUTPATIENT)
Dept: PULMONOLOGY | Age: 75
End: 2025-04-28
Payer: MEDICARE

## 2025-04-28 VITALS
HEART RATE: 83 BPM | WEIGHT: 123 LBS | OXYGEN SATURATION: 92 % | BODY MASS INDEX: 20.49 KG/M2 | HEIGHT: 65 IN | DIASTOLIC BLOOD PRESSURE: 72 MMHG | SYSTOLIC BLOOD PRESSURE: 136 MMHG

## 2025-04-28 DIAGNOSIS — J43.9 PULMONARY EMPHYSEMA, UNSPECIFIED EMPHYSEMA TYPE (HCC): ICD-10-CM

## 2025-04-28 DIAGNOSIS — R06.02 SHORTNESS OF BREATH: ICD-10-CM

## 2025-04-28 DIAGNOSIS — Z87.891 FORMER SMOKER: ICD-10-CM

## 2025-04-28 DIAGNOSIS — G47.34 NOCTURNAL HYPOXEMIA: ICD-10-CM

## 2025-04-28 DIAGNOSIS — J44.9 STAGE 2 MODERATE COPD BY GOLD CLASSIFICATION (HCC): Primary | ICD-10-CM

## 2025-04-28 DIAGNOSIS — G72.41 INCLUSION BODY MYOSITIS (HCC): ICD-10-CM

## 2025-04-28 PROCEDURE — 1036F TOBACCO NON-USER: CPT | Performed by: INTERNAL MEDICINE

## 2025-04-28 PROCEDURE — 1159F MED LIST DOCD IN RCRD: CPT | Performed by: INTERNAL MEDICINE

## 2025-04-28 PROCEDURE — 1123F ACP DISCUSS/DSCN MKR DOCD: CPT | Performed by: INTERNAL MEDICINE

## 2025-04-28 PROCEDURE — G8427 DOCREV CUR MEDS BY ELIG CLIN: HCPCS | Performed by: INTERNAL MEDICINE

## 2025-04-28 PROCEDURE — 3075F SYST BP GE 130 - 139MM HG: CPT | Performed by: INTERNAL MEDICINE

## 2025-04-28 PROCEDURE — 3078F DIAST BP <80 MM HG: CPT | Performed by: INTERNAL MEDICINE

## 2025-04-28 PROCEDURE — 3017F COLORECTAL CA SCREEN DOC REV: CPT | Performed by: INTERNAL MEDICINE

## 2025-04-28 PROCEDURE — 3023F SPIROM DOC REV: CPT | Performed by: INTERNAL MEDICINE

## 2025-04-28 PROCEDURE — G8420 CALC BMI NORM PARAMETERS: HCPCS | Performed by: INTERNAL MEDICINE

## 2025-04-28 PROCEDURE — 99213 OFFICE O/P EST LOW 20 MIN: CPT | Performed by: INTERNAL MEDICINE

## 2025-04-28 PROCEDURE — G8400 PT W/DXA NO RESULTS DOC: HCPCS | Performed by: INTERNAL MEDICINE

## 2025-04-28 PROCEDURE — 1090F PRES/ABSN URINE INCON ASSESS: CPT | Performed by: INTERNAL MEDICINE

## 2025-04-28 PROCEDURE — 1160F RVW MEDS BY RX/DR IN RCRD: CPT | Performed by: INTERNAL MEDICINE

## 2025-04-28 RX ORDER — BUDESONIDE, GLYCOPYRROLATE, AND FORMOTEROL FUMARATE 160; 9; 4.8 UG/1; UG/1; UG/1
2 AEROSOL, METERED RESPIRATORY (INHALATION) 2 TIMES DAILY
Qty: 1 EACH | Refills: 11 | Status: SHIPPED | OUTPATIENT
Start: 2025-04-28

## 2025-04-28 NOTE — PROGRESS NOTES
SUBJECTIVE:  Chief Complaint: Moderate/stage II COPD, pulmonary emphysema, shortness of breath, nocturnal hypoxemia, inclusion body myositis, former smoker  Kanika states that she has had no recent episodes of bronchitis or worsening shortness of breath.  Because of her inclusion body myositis her ambulation is quite limited and she only has mild shortness of breath at rest.  She continues to wear oxygen at nighttime.  She has not been using her Breztri and I advised her to continue using it.  She has a rescue albuterol inhaler but has not required it recently.  She does complain of some difficulty expectorating mucus and seems to think she has increased chest congestion at times.  Kanika quit smoking 13 years ago but does have a 44-pack-year history.  She refuses to receive any of the routine fall viral vaccines such as influenza, COVID-19 and RSV.      ROS:  Constitution:  HEENT: Negative for ear, throat pain  Cardiovascular: Negative for chest pain, syncope, edema  Pulmonary: See HPI  Musculoskeletal: Negative for DVT, myalgias, arthralgias    OBJECTIVE:  /72   Pulse 83   Ht 1.651 m (5' 5\")   Wt 55.8 kg (123 lb)   SpO2 92%   BMI 20.47 kg/m²      Physical Exam:  Constitutional:  She appears well developed and well-nourished.  In no respiratory distress at rest.  Not using accessory muscles respiration and not coughing during exam  Neck:  Supple, No palpable lymphadenopathy, No JVD  Cardiovascular:  S1, S2 Normal, Regular rhythm, no murmurs or gallops, No pericardial  rubs.  Pulmonary: Breath sounds are mildly diminished but otherwise fairly clear throughout all areas.  I do hear a few scattered rhonchi.  No wheezing is noted  Abdomen: Not examined  Extremities: no edema, No DVT  Neurologic: Oriented x3, No focal deficits    Radiology: Chest CT with contrast 3/25/2024  Moderate emphysema with bibasilar linear scarring/atelectasis.  No acute traumatic injury  New borderline 1 cm left upper lobe

## 2025-08-06 ENCOUNTER — TELEPHONE (OUTPATIENT)
Dept: GASTROENTEROLOGY | Age: 75
End: 2025-08-06

## 2025-08-07 RX ORDER — PANTOPRAZOLE SODIUM 40 MG/1
40 TABLET, DELAYED RELEASE ORAL
Qty: 90 TABLET | Refills: 1 | Status: SHIPPED | OUTPATIENT
Start: 2025-08-07

## 2025-08-11 ENCOUNTER — SCHEDULED TELEPHONE ENCOUNTER (OUTPATIENT)
Dept: GASTROENTEROLOGY | Age: 75
End: 2025-08-11

## 2025-08-11 DIAGNOSIS — K20.0 EOSINOPHILIC ESOPHAGITIS: ICD-10-CM

## 2025-08-11 DIAGNOSIS — K21.9 GASTROESOPHAGEAL REFLUX DISEASE WITHOUT ESOPHAGITIS: ICD-10-CM

## 2025-08-11 DIAGNOSIS — Z87.898 HISTORY OF DYSPHAGIA: Primary | ICD-10-CM

## 2025-08-11 DIAGNOSIS — Z98.890 HISTORY OF ESOPHAGEAL DILATATION: ICD-10-CM

## 2025-08-11 RX ORDER — PANTOPRAZOLE SODIUM 40 MG/1
40 TABLET, DELAYED RELEASE ORAL
Qty: 90 TABLET | Refills: 5 | Status: SHIPPED | OUTPATIENT
Start: 2025-08-11

## 2025-08-11 ASSESSMENT — ENCOUNTER SYMPTOMS
BLOOD IN STOOL: 0
DIARRHEA: 0
EYE PAIN: 0
CONSTIPATION: 0
NAUSEA: 0
COLOR CHANGE: 0
BACK PAIN: 0
SHORTNESS OF BREATH: 0
ABDOMINAL PAIN: 0
VOMITING: 0
WHEEZING: 0
COUGH: 0

## (undated) DEVICE — HERCULES 3 STAGE BALLOON ESOPHAGEAL: Brand: HERCULES

## (undated) DEVICE — SYRINGE INFL 60ML DISP ALLIANCE II

## (undated) DEVICE — SUTURE VCRL SZ 2-0 L18IN ABSRB UD CT-1 L36MM 1/2 CIR J839D

## (undated) DEVICE — BANDAGE,SELF ADHRNT,COFLEX,4"X5YD,STRL: Brand: COLABEL

## (undated) DEVICE — FORCEPS BX L240CM JAW DIA2.8MM L CAP W/ NDL MIC MESH TOOTH

## (undated) DEVICE — TUBING, SUCTION, 9/32" X 10', STRAIGHT: Brand: MEDLINE

## (undated) DEVICE — GLOVE ORANGE PI 7 1/2   MSG9075

## (undated) DEVICE — INTENDED FOR TISSUE SEPARATION, AND OTHER PROCEDURES THAT REQUIRE A SHARP SURGICAL BLADE TO PUNCTURE OR CUT.: Brand: BARD-PARKER ® STAINLESS STEEL BLADES

## (undated) DEVICE — SPONGE GZ W4XL8IN COT WVN 12 PLY

## (undated) DEVICE — SHEET,DRAPE,53X77,STERILE: Brand: MEDLINE

## (undated) DEVICE — ENDOSCOPY KIT: Brand: MEDLINE INDUSTRIES, INC.

## (undated) DEVICE — PAD,ABDOMINAL,5"X9",ST,LF,25/BX: Brand: MEDLINE INDUSTRIES, INC.

## (undated) DEVICE — SPONGE LAP W18XL18IN WHT COT 4 PLY FLD STRUNG RADPQ DISP ST 2 PER PACK

## (undated) DEVICE — DRESSING,GAUZE,XEROFORM,CURAD,1"X8",ST: Brand: CURAD

## (undated) DEVICE — YANKAUER,FLEXIBLE HANDLE,REGLR CAPACITY: Brand: MEDLINE INDUSTRIES, INC.

## (undated) DEVICE — MARKER SURG SKIN UTIL REGULAR/FINE 2 TIP W/ RUL AND 9 LBL

## (undated) DEVICE — BIT DRL L200MM DIA2.8MM CALIB L100MM FOR 3.5MM VA LCP PROX

## (undated) DEVICE — CHLORAPREP 26ML ORANGE

## (undated) DEVICE — ZIMMER® STERILE DISPOSABLE TOURNIQUET CUFF WITH PLC, DUAL PORT, SINGLE BLADDER, 24 IN. (61 CM)

## (undated) DEVICE — GLOVE SURG SZ 65 L12IN FNGR THK79MIL GRN LTX FREE

## (undated) DEVICE — 3M™ STERI-DRAPE™ U-DRAPE 1015: Brand: STERI-DRAPE™

## (undated) DEVICE — C-ARMOR C-ARM EQUIPMENT COVERS CLEAR STERILE UNIVERSAL FIT 12 PER CASE: Brand: C-ARMOR

## (undated) DEVICE — DRESSING TRNSPAR W5XL4.5IN FLM SHT SEMIPERMEABLE WIND

## (undated) DEVICE — SYRINGE MED 30ML STD CLR PLAS LUERLOCK TIP N CTRL DISP

## (undated) DEVICE — GOWN,SIRUS,POLYRNF,BRTHSLV,XLN/XL,20/CS: Brand: MEDLINE

## (undated) DEVICE — TOWEL,OR,DSP,ST,BLUE,STD,6/PK,12PK/CS: Brand: MEDLINE

## (undated) DEVICE — GOWN,ECLIPSE,POLYRNF,BRTHSLV,XL,30/CS: Brand: MEDLINE

## (undated) DEVICE — GLOVE SURG SZ 65 THK91MIL LTX FREE SYN POLYISOPRENE

## (undated) DEVICE — BANDAGE,ELASTIC,ESMARK,STERILE,6"X9',LF: Brand: MEDLINE

## (undated) DEVICE — ESOPHAGEAL/PYLORIC/COLONIC WIREGUIDED BALLOON DILATATION CATHETER: Brand: CRE WIREGUIDED

## (undated) DEVICE — SUTURE ETHLN SZ 3-0 L30IN NONABSORBABLE BLK FS-1 L24MM 3/8 669H

## (undated) DEVICE — DRAPE,EXTREMITY,89X128,STERILE: Brand: MEDLINE

## (undated) DEVICE — PADDING,UNDERCAST,COTTON, 4"X4YD STERILE: Brand: MEDLINE

## (undated) DEVICE — NEEDLE HYPO 20GA L1.5IN YEL POLYPR HUB S STL REG BVL STR

## (undated) DEVICE — DRAPE,U/ SHT,SPLIT,PLAS,STERIL: Brand: MEDLINE

## (undated) DEVICE — SUTURE ETHLN SZ 3-0 L18IN NONABSORBABLE BLK FS-1 L24MM 3/8 663H

## (undated) DEVICE — PACK,BASIC,SIRUS,V: Brand: MEDLINE

## (undated) DEVICE — TAPE CAST W12.5CMXL3.6M PNK FBRGLS H2O ACT POLYUR RESIN

## (undated) DEVICE — GLOVE ORANGE PI 8   MSG9080

## (undated) DEVICE — SYRINGE IRRIG 60ML SFT PLIABLE BLB EZ TO GRP 1 HND USE W/

## (undated) DEVICE — PENCIL ES CRD L10FT HND SWCHING ROCK SWCH W/ EDGE COAT BLDE

## (undated) DEVICE — BIT DRL L110MM DIA3.5MM QUIK CPL W/O STP REUSE

## (undated) DEVICE — BIT DRL L110MM DIA2.5MM G QUIK CPL W/O STP REUSE

## (undated) DEVICE — DILATOR ENDOSCP L180CM DIA6FR BLLN L8CM DIA54-60FR

## (undated) DEVICE — C-ARM: Brand: UNBRANDED

## (undated) DEVICE — COUNTER NDL 30 COUNT FOAM STRP SGL MAG

## (undated) DEVICE — GLOVE SURG SZ 7 L12IN FNGR THK79MIL GRN LTX FREE

## (undated) DEVICE — BIT DRL L140MM DIA2MM QUIK CPL 3 FLUT CALIB DEPTH MRK W/O